# Patient Record
Sex: FEMALE | Race: WHITE | NOT HISPANIC OR LATINO | Employment: FULL TIME | URBAN - METROPOLITAN AREA
[De-identification: names, ages, dates, MRNs, and addresses within clinical notes are randomized per-mention and may not be internally consistent; named-entity substitution may affect disease eponyms.]

---

## 2017-01-06 ENCOUNTER — ALLSCRIPTS OFFICE VISIT (OUTPATIENT)
Dept: OTHER | Facility: OTHER | Age: 52
End: 2017-01-06

## 2017-05-01 ENCOUNTER — ALLSCRIPTS OFFICE VISIT (OUTPATIENT)
Dept: OTHER | Facility: OTHER | Age: 52
End: 2017-05-01

## 2017-05-01 DIAGNOSIS — Z12.31 ENCOUNTER FOR SCREENING MAMMOGRAM FOR MALIGNANT NEOPLASM OF BREAST: ICD-10-CM

## 2017-07-01 ENCOUNTER — TRANSCRIBE ORDERS (OUTPATIENT)
Dept: ADMINISTRATIVE | Facility: HOSPITAL | Age: 52
End: 2017-07-01

## 2017-07-01 ENCOUNTER — APPOINTMENT (OUTPATIENT)
Dept: LAB | Facility: MEDICAL CENTER | Age: 52
End: 2017-07-01
Payer: COMMERCIAL

## 2017-07-01 DIAGNOSIS — Z00.8 HEALTH EXAMINATION IN POPULATION SURVEYS: Primary | ICD-10-CM

## 2017-07-01 LAB
CHOLEST SERPL-MCNC: 186 MG/DL (ref 50–200)
EST. AVERAGE GLUCOSE BLD GHB EST-MCNC: 103 MG/DL
HBA1C MFR BLD: 5.2 % (ref 4.2–6.3)
HDLC SERPL-MCNC: 76 MG/DL (ref 40–60)
LDLC SERPL CALC-MCNC: 88 MG/DL (ref 0–100)
TRIGL SERPL-MCNC: 111 MG/DL

## 2017-07-01 PROCEDURE — 80061 LIPID PANEL: CPT | Performed by: PREVENTIVE MEDICINE

## 2017-07-01 PROCEDURE — 36415 COLL VENOUS BLD VENIPUNCTURE: CPT | Performed by: PREVENTIVE MEDICINE

## 2017-07-01 PROCEDURE — 83036 HEMOGLOBIN GLYCOSYLATED A1C: CPT | Performed by: PREVENTIVE MEDICINE

## 2017-07-14 ENCOUNTER — ANESTHESIA EVENT (OUTPATIENT)
Dept: PERIOP | Facility: HOSPITAL | Age: 52
End: 2017-07-14
Payer: COMMERCIAL

## 2017-07-21 ENCOUNTER — ANESTHESIA (OUTPATIENT)
Dept: PERIOP | Facility: HOSPITAL | Age: 52
End: 2017-07-21
Payer: COMMERCIAL

## 2017-07-21 ENCOUNTER — HOSPITAL ENCOUNTER (OUTPATIENT)
Facility: HOSPITAL | Age: 52
Setting detail: OUTPATIENT SURGERY
Discharge: HOME | End: 2017-07-21
Attending: PODIATRIST | Admitting: PODIATRIST
Payer: COMMERCIAL

## 2017-07-21 VITALS
DIASTOLIC BLOOD PRESSURE: 76 MMHG | RESPIRATION RATE: 16 BRPM | SYSTOLIC BLOOD PRESSURE: 137 MMHG | OXYGEN SATURATION: 99 % | BODY MASS INDEX: 27.6 KG/M2 | HEART RATE: 67 BPM | WEIGHT: 150 LBS | TEMPERATURE: 97.5 F | HEIGHT: 62 IN

## 2017-07-21 RX ORDER — PROPOFOL 10 MG/ML
INJECTION, EMULSION INTRAVENOUS AS NEEDED
Status: DISCONTINUED | OUTPATIENT
Start: 2017-07-21 | End: 2017-07-21 | Stop reason: SURG

## 2017-07-21 RX ORDER — ONDANSETRON 2 MG/ML
4 INJECTION INTRAMUSCULAR; INTRAVENOUS ONCE AS NEEDED
Status: DISCONTINUED | OUTPATIENT
Start: 2017-07-21 | End: 2017-07-21 | Stop reason: HOSPADM

## 2017-07-21 RX ORDER — DEXAMETHASONE SODIUM PHOSPHATE 4 MG/ML
INJECTION, SOLUTION INTRA-ARTICULAR; INTRALESIONAL; INTRAMUSCULAR; INTRAVENOUS; SOFT TISSUE AS NEEDED
Status: DISCONTINUED | OUTPATIENT
Start: 2017-07-21 | End: 2017-07-21 | Stop reason: HOSPADM

## 2017-07-21 RX ORDER — SODIUM CHLORIDE 9 MG/ML
125 INJECTION, SOLUTION INTRAVENOUS CONTINUOUS
Status: DISCONTINUED | OUTPATIENT
Start: 2017-07-21 | End: 2017-07-21 | Stop reason: HOSPADM

## 2017-07-21 RX ORDER — ONDANSETRON 2 MG/ML
INJECTION INTRAMUSCULAR; INTRAVENOUS AS NEEDED
Status: DISCONTINUED | OUTPATIENT
Start: 2017-07-21 | End: 2017-07-21 | Stop reason: SURG

## 2017-07-21 RX ORDER — LIDOCAINE HYDROCHLORIDE 10 MG/ML
INJECTION, SOLUTION INFILTRATION; PERINEURAL AS NEEDED
Status: DISCONTINUED | OUTPATIENT
Start: 2017-07-21 | End: 2017-07-21 | Stop reason: SURG

## 2017-07-21 RX ORDER — MIDAZOLAM HYDROCHLORIDE 1 MG/ML
INJECTION INTRAMUSCULAR; INTRAVENOUS AS NEEDED
Status: DISCONTINUED | OUTPATIENT
Start: 2017-07-21 | End: 2017-07-21 | Stop reason: SURG

## 2017-07-21 RX ORDER — BUPIVACAINE HYDROCHLORIDE 5 MG/ML
INJECTION, SOLUTION PERINEURAL AS NEEDED
Status: DISCONTINUED | OUTPATIENT
Start: 2017-07-21 | End: 2017-07-21 | Stop reason: HOSPADM

## 2017-07-21 RX ORDER — FENTANYL CITRATE 50 UG/ML
INJECTION, SOLUTION INTRAMUSCULAR; INTRAVENOUS AS NEEDED
Status: DISCONTINUED | OUTPATIENT
Start: 2017-07-21 | End: 2017-07-21 | Stop reason: SURG

## 2017-07-21 RX ORDER — FENTANYL CITRATE/PF 50 MCG/ML
25 SYRINGE (ML) INJECTION
Status: DISCONTINUED | OUTPATIENT
Start: 2017-07-21 | End: 2017-07-21 | Stop reason: HOSPADM

## 2017-07-21 RX ADMIN — DEXAMETHASONE SODIUM PHOSPHATE 4 MG: 10 INJECTION INTRAMUSCULAR; INTRAVENOUS at 07:50

## 2017-07-21 RX ADMIN — MIDAZOLAM HYDROCHLORIDE 4 MG: 1 INJECTION, SOLUTION INTRAMUSCULAR; INTRAVENOUS at 07:29

## 2017-07-21 RX ADMIN — ONDANSETRON HYDROCHLORIDE 4 MG: 2 INJECTION, SOLUTION INTRAVENOUS at 07:50

## 2017-07-21 RX ADMIN — SODIUM CHLORIDE 125 ML/HR: 0.9 INJECTION, SOLUTION INTRAVENOUS at 06:10

## 2017-07-21 RX ADMIN — PROPOFOL 200 MG: 10 INJECTION, EMULSION INTRAVENOUS at 07:38

## 2017-07-21 RX ADMIN — LIDOCAINE HYDROCHLORIDE 100 MG: 10 INJECTION, SOLUTION INFILTRATION; PERINEURAL at 07:38

## 2017-07-21 RX ADMIN — FENTANYL CITRATE 25 MCG: 50 INJECTION, SOLUTION INTRAMUSCULAR; INTRAVENOUS at 07:38

## 2017-08-07 ENCOUNTER — ALLSCRIPTS OFFICE VISIT (OUTPATIENT)
Dept: OTHER | Facility: OTHER | Age: 52
End: 2017-08-07

## 2017-08-11 ENCOUNTER — ALLSCRIPTS OFFICE VISIT (OUTPATIENT)
Dept: OTHER | Facility: OTHER | Age: 52
End: 2017-08-11

## 2017-08-11 DIAGNOSIS — R53.83 OTHER FATIGUE: ICD-10-CM

## 2017-08-11 DIAGNOSIS — R63.5 ABNORMAL WEIGHT GAIN: ICD-10-CM

## 2017-08-26 ENCOUNTER — APPOINTMENT (OUTPATIENT)
Dept: LAB | Facility: MEDICAL CENTER | Age: 52
End: 2017-08-26
Payer: COMMERCIAL

## 2017-08-26 DIAGNOSIS — R63.5 ABNORMAL WEIGHT GAIN: ICD-10-CM

## 2017-08-26 DIAGNOSIS — R53.83 OTHER FATIGUE: ICD-10-CM

## 2017-08-26 LAB — TSH SERPL DL<=0.05 MIU/L-ACNC: 1.34 UIU/ML (ref 0.36–3.74)

## 2017-08-26 PROCEDURE — 36415 COLL VENOUS BLD VENIPUNCTURE: CPT

## 2017-08-26 PROCEDURE — 84443 ASSAY THYROID STIM HORMONE: CPT

## 2017-09-07 PROCEDURE — 88305 TISSUE EXAM BY PATHOLOGIST: CPT | Performed by: SPECIALIST

## 2017-09-08 ENCOUNTER — LAB REQUISITION (OUTPATIENT)
Dept: LAB | Facility: HOSPITAL | Age: 52
End: 2017-09-08
Payer: COMMERCIAL

## 2017-09-08 DIAGNOSIS — D48.5 NEOPLASM OF UNCERTAIN BEHAVIOR OF SKIN: ICD-10-CM

## 2017-09-13 ENCOUNTER — ALLSCRIPTS OFFICE VISIT (OUTPATIENT)
Dept: OTHER | Facility: OTHER | Age: 52
End: 2017-09-13

## 2017-09-26 ENCOUNTER — ALLSCRIPTS OFFICE VISIT (OUTPATIENT)
Dept: OTHER | Facility: OTHER | Age: 52
End: 2017-09-26

## 2017-09-26 ENCOUNTER — TRANSCRIBE ORDERS (OUTPATIENT)
Dept: ADMINISTRATIVE | Facility: HOSPITAL | Age: 52
End: 2017-09-26

## 2017-09-26 DIAGNOSIS — R00.2 PALPITATIONS: Primary | ICD-10-CM

## 2017-10-27 NOTE — PROGRESS NOTES
Assessment  Assessed    1  Intermittent palpitations (785 1) (R00 2)   2  Abnormal weight gain (783 1) (R63 5)   3  Anxiety (300 00) (F41 9)   4  Paroxysmal supraventricular tachycardia (427 0) (I47 1)   5  Overweight (BMI 25 0-29 9) (278 02) (E66 3)   6  Dyslipidemia (272 4) (E78 5)    Plan  Dyslipidemia, Health Maintenance    · Atorvastatin Calcium 20 MG Oral Tablet; Take 1 tablet by mouth daily   Rx By: Jovana Fernandez; Dispense: 0 Days ; #:90 Tablet; Refill: 3;For: Dyslipidemia, Health Maintenance; ALONSO = N; Verified Transmission to [address] [place] [address]; Last Updated By: SystemDelight; 9/26/2017 9:43:32 AM  Intermittent palpitations    · HOLTER MONITOR - 24 HOUR; Status:Active; Requested for:05Mar2018; Perform:[facility]; MHB:[id number]; Last Updated By:Gina Linda; 9/26/2017 9:42:20 AM;Ordered; For:Intermittent palpitations; Ordered By:Tarik Mello;  Paroxysmal supraventricular tachycardia    · EKG/ECG- POC; Status:Complete;   Done: 18SHM0195   Perform: In Office; [phone]; Last Updated By:Yoly Ji; 9/26/2017 8:59:35 AM;Ordered; For:Paroxysmal supraventricular tachycardia; Ordered By:Tarik Mello;      1  Continue present medications  2  Renewed atorvastatin 20 mg #90 at 3, [facility] mail-order  3  Cardiology follow-up in approximately 6 months with Holter monitor, EKG,     Discussion/Summary  Cardiology Discussion Summary Free Text Note Form [facility]:     Increasing palpitations, mild in severity, with history of AVNRT/PSVT diagnosed in 2005  Controlled dyslipidemia with prior 1 1% 10 year and 38 8% lifetime ASCVD risks  History of chest pain with normal nuclear stress test on 9/29/16 26 5 pound weight gain since January, 2015 and 4 9 pound weight gain in approximately one year  and overweight status  Goals and Barriers: The patient has the current Goals: Maintenance of cardiovascular health  The patent has the current Barriers: None     Patient's Capacity to Self-Care: Patient is able to Self-Care  Medication SE Review and Pt Understands Tx: Possible side effects of new medications were reviewed with the patient/guardian today  The treatment plan was reviewed with the patient/guardian  The patient/guardian understands and agrees with the treatment plan   Counseling Documentation With Imm: The patient was counseled regarding diagnostic results,-- instructions for management,-- risk factor reductions,-- prognosis,-- patient and family education,-- impressions,-- risks and benefits of treatment options,-- importance of compliance with treatment  Self Referrals:   Self Referrals: Yes   Transitional Care: Co-manage care with PCP/Referring Provider  Chief Complaint  Chief Complaint Free Text Note Form: Jhonathan Gray is here today for a yearly followup  She states she has had increased fluttering  JW      History of Present Illness  Cardiology HPI Free Text Note Form Manuel Kebede:   24-year-old female has had increased frequency of brief flutters or butterfly sensations in her chest at rest or with activity in recent weeks  She currently works 9 hours per day as a  at [employer] within [employer] 5 days a week  She still has not reached a final settlement on her divorce negotiations  had foot surgery in July 21, 2017 for plantar fasciitis with fasciotomy performed  She is currently using meloxicam one daily for that issue  She is also on an estrogen and progesterone products since 1 May, 2017  There have been no other cardiopulmonary or medical symptoms  She exercises on a stationary bicycle for 20 minutes daily  Review of Systems  ROS Reviewed:   ROS reviewed  All other systems negative, except as noted in history of present illness  Active Problems  Problems    1  Abnormal weight gain (783 1) (R63 5)   2  Acute rhinitis (460) (J00)   3  Acute serous otitis media of left ear, recurrence not specified (381 01) (H65 02)   4  Anxiety (300 00) (F41 9)   5  History of Bunion, right foot (727 1) (M21 611)   6  Common migraine without aura (346 10) (G43 009)   7  Ear pain, left (388 70) (H92 02)   8  Encounter for screening mammogram for malignant neoplasm of breast (V76 12)   (Z12 31)   9  Fatigue (780 79) (R53 83)   10  Neck muscle spasm (728 85) (M62 838)   11  Neck pain, musculoskeletal (723 1) (M54 2)   12  Need for influenza vaccination (V04 81) (Z23)   13  Overweight (BMI 25 0-29 9) (278 02) (E66 3)   14  Paresthesia of right arm (782 0) (R20 2)   15  Paroxysmal supraventricular tachycardia (427 0) (I47 1)   16  Plantar fasciitis (728 71) (M72 2)   17  Postoperative visit (V58 49) (Z48 89)   18  History of Right carpal tunnel syndrome (354 0) (G56 01)   19  Symptomatic menopausal or female climacteric states (627 2) (N95 1)   20  Upper back pain (724 5) (M54 9)   21  Urinary frequency (788 41) (R35 0)   22  Urinary tract infection (599 0) (N39 0)   23  Vitamin D deficiency (268 9) (E55 9)   24  Well female exam with routine gynecological exam (V72 31) (Z01 419)    Past Medical History  Problems    1  History of Abnormal uterine bleeding (AUB) (626 9) (N93 9)   2  History of Bunion, right foot (727 1) (M21 611)   3  History of Encounter for routine gynecological examination (V72 31) (Z01 419)   4  History of Functional murmur (R01 0)   5  History of cardiac disorder (V12 50) (Z86 79)   6  History of migraine (V12 49) (Z86 69)   7  History of urinary tract infection (V13 02) (Z87 440)   8  History of urticaria (V13 3) (Z87 2)   9  History of Lightheadedness (780 4) (R42)   10  History of Perimenopause (627 2) (N95 1)   11  History of Right carpal tunnel syndrome (354 0) (G56 01)   12  History of Screening for HPV (human papillomavirus) (V73 81) (Z11 51)   13  History of Subserous leiomyoma of uterus (218 2) (D25 2)   14  History of Uterine leiomyoma, unspecified location (218 9) (D25 9)   15   History of Visit for screening mammogram (V76 12) (Z12 31)  Active Problems And Past Medical History Reviewed: The active problems and past medical history were reviewed and updated today  Surgical History  Problems    1  History of Ablation Of Vaginal Lesion(S)   2  History of Arthrosc Endoscopic Plantar Fasciotomy   3  History of Laparoscopy With Total Hysterectomy   4  History of Neuroplasty Decompression Median Nerve At Carpal Tunnel   5  History of Oviductal Surgery Bilateral Salpingectomy   6  History of Tubal Ligation   7  History of Vaginal Hysterectomy  Surgical History Reviewed: The surgical history was reviewed and updated today  Family History  Mother    1  Family history of diabetes mellitus (V18 0) (Z83 3)   2  Family history of malignant neoplasm of uterus (V16 49) (Z80 49)   3  Family history of obesity (V18 19) (Z83 49)  Father    4  Family history of chronic obstructive pulmonary disease (V17 6) (Z82 5)  Sister    5  Family history of hypothyroidism (V18 19) (Z83 49)  Brother    10  Family history of atrial fibrillation (V17 49) (Z82 49)  Maternal Grandmother    7  Family history of congestive heart failure (V17 49) (Z82 49)  Paternal Grandmother    8  Family history of myocardial infarction (V17 3) (Z82 49)  Maternal Grandfather    9  Family history of congestive heart failure (V17 49) (Z82 49)  Family History    10  Family history of diabetes mellitus (V18 0) (Z83 3)   11  Family history of malignant neoplasm (V16 9) (Z80 9)  Family History Reviewed: The family history was reviewed and updated today  Social History  Problems    · Alcohol use (V49 89) (Z78 9)   · Caffeine use (V49 89) (F15 90)   · Coffee   · Exercise: Walking   ·    · Never a smoker   · Occasional alcohol use   ·  (V61 03) (Z63 5)   · Sexually active  Social History Reviewed: The social history was reviewed and updated today  Current Meds   1  Atorvastatin Calcium 20 MG Oral Tablet; Take 1 tablet by mouth daily;    Therapy: 87DPW6720 to (Last Rx: 14KQL1785)  Requested for: 37DBO1682 Ordered   2  Estradiol 0 5 MG Oral Tablet; TAKE 1 TABLET DAILY; Therapy: 47LPU1399 to (IIGJBCLQ:58HZS3625)  Requested for: 2017; Last   VS:[id number] Ordered   3  Meloxicam 7 5 MG Oral Tablet; Therapy: (Lakeisha Arvizu) to Recorded   4  Metoprolol Succinate ER 50 MG Oral Tablet Extended Release 24 Hour; TAKE 1 TABLET   DAILY  Requested for: 96BYM4561; Last L25LXB4669 Ordered   5  Progesterone Micronized 100 MG Oral Capsule; take 1 capsule daily; Therapy: 47WJN5149 to (Last ZN:46QJP4461)  Requested for: 2017 Ordered  Medication List Reviewed: The medication list was reviewed and updated today  Allergies  Medication    1  Levaquin TABS  Non-Medication    2  No Known Environmental Allergies   3  No Known Food Allergies    Vitals  Vital Signs    Recorded: 90JKS7784 08:57AM   Heart Rate 75, Apical   Systolic 512, RUE, Sitting   Diastolic 84, RUE, Sitting   Height 5 ft 2 in   Weight 151 lb 14 4 oz   BMI Calculated 27 78   BSA Calculated 1 7   O2 Saturation 98, RA     Physical Exam    Constitutional   General appearance: No acute distress, well appearing and well nourished  Eyes   Conjunctiva and Sclera examination: Conjunctiva pink, sclera anicteric  Ears, Nose, Mouth, and Throat - Oropharynx: Clear, nares are clear, mucous membranes are moist    Neck   Neck and thyroid: Normal, supple, trachea midline, no thyromegaly  Pulmonary   Respiratory effort: No increased work of breathing or signs of respiratory distress  Auscultation of lungs: Clear to auscultation, no rales, no rhonchi, no wheezing, good air movement  Cardiovascular   Auscultation of heart: Normal rate and rhythm, normal S1 and S2, no murmurs  Carotid pulses: Normal, 2+ bilaterally  Peripheral vascular exam: Normal pulses throughout, no tenderness, erythema or swelling  Pedal pulses: Normal, 2+ bilaterally      Examination of extremities for edema and/or varicosities: Normal     Abdomen   Abdomen: Non-tender and no distention  Liver and spleen: No hepatomegaly or splenomegaly  Musculoskeletal Gait and station: Normal gait  -- Digits and nails: Normal without clubbing or cyanosis  -- Inspection/palpation of joints, bones, and muscles: Normal, ROM normal     Skin - Skin and subcutaneous tissue: Normal without rashes or lesions  Skin is warm and well perfused, normal turgor  Neurologic - Cranial nerves: II - XII intact  -- Speech: Normal     Psychiatric - Orientation to person, place, and time: Normal -- Mood and affect: Normal       Results/Data  ECG Report: EKG 9/26/17and unchanged from 8/30/16    (1) TSH WITH FT4 REFLEX 03Lpn5496 09:24AM [facility] Order Number: [id number]     Test Name Result Flag Reference   TSH 1 340 uIU/mL  0 358-3 740   Patients undergoing fluorescein dye angiography may retain small amounts of fluorescein in the body for 48-72 hours post procedure  Samples containing fluorescein can produce falsely depressed TSH values  If the patient had this procedure,a specimen should be resubmitted post fluorescein clearance  The recommended reference ranges for TSH during pregnancy are as follows:  First trimester 0 1 to 2 5 uIU/mL  Second trimester  0 2 to 3 0 uIU/mL  Third trimester 0 3 to 3 0 uIU/m     (1) LIPID PANEL, FASTING 91NNA4724 09:28AM Kenlotus Allison     Test Name Result Flag Reference   CHOLESTEROL 186 mg/dL     HDL,DIRECT 76 mg/dL H 40-60   Specimen collection should occur prior to Metamizole administration due to the potential for falsely depressed results     LDL CHOLESTEROL CALCULATED 88 mg/dL  0-100   This is a fasting blood test  Water,black tea or black  coffee only after 9:00pm the night before test  Drink 2 glasses of water the morning of test         Triglyceride:         Normal              <150 mg/dl       Borderline High    150-199 mg/dl       High               200-499 mg/dl       Very High          >499 mg/dl  Cholesterol:         Desirable        <200 mg/dl      Borderline High  200-239 mg/dl      High             >239 mg/dl  HDL Cholesterol:        High    >59 mg/dL      Low     <41 mg/dL  LDL CALCULATED:    This screening LDL is a calculated result  It does not have the accuracy of the Direct Measured LDL in the monitoring of patients with hyperlipidemia and/or statin therapy  Direct Measure LDL (GNT539) must be ordered separately in these patients  TRIGLYCERIDES 111 mg/dL  <=150   Specimen collection should occur prior to N-Acetylcysteine or Metamizole administration due to the potential for falsely depressed results  (1) HEMOGLOBIN A1C 76ZBK1006 09:28AM Wes Arriola     Test Name Result Flag Reference   HEMOGLOBIN A1C 5 2 %  4 2-6 3   EST  AVG  GLUCOSE 103 mg/dl       * XR FOOT 3+ VIEW RIGHT 81IBV8685 05:38PM Kerri Weston Order Number: [id number]     Test Name Result Flag Reference   XR FOOT 3+ VW RIGHT (Report)     RIGHT FOOT     INDICATION: Heel pain  Plantar fascial fibromatosis     COMPARISON: July 10, 2015     VIEWS: AP, lateral and oblique ; 3 images     FINDINGS:     There is no acute fracture or dislocation  Postsurgical changes are noted along the medial margin of the 1st metatarsal head with screw transfixing an old healed 1st metatarsal neck fracture, unchanged in alignment from July 10, 2015  Small plantar calcaneal spur  No lytic or blastic lesions are seen  Soft tissues are unremarkable  No plantar fascial calcification is seen  IMPRESSION:     Postop changes, 1st metatarsal, healed  Calcaneal spur  Workstation performed: [id number]     Signed by:   Trino Mckeon MD   12/8/16     Future Appointments    Date/Time Provider Specialty Site   10/13/2017 03:30 PM Rock Olivares [facility]   11/29/2017 03:45 PM WINSOME Steele   B[facility]   08/21/2018 09:00 AM WINSOME Bonilla  [address] [facility]     Signatures   Electronically signed by : WINSOME Ramirez ; Sep 26 2017  9:47AM EST                       (Author)

## 2017-12-21 ENCOUNTER — ALLSCRIPTS OFFICE VISIT (OUTPATIENT)
Dept: OTHER | Facility: OTHER | Age: 52
End: 2017-12-21

## 2017-12-22 NOTE — PROGRESS NOTES
Assessment   1  Acute right-sided low back pain with right-sided sciatica (724 2,724 3) (M54 41)    Plan   Acute right-sided low back pain with right-sided sciatica    · Cyclobenzaprine HCl - 5 MG Oral Tablet; TAKE 1 TO 2 TABLETS 3 TIMES DAILY    AS NEEDED   · PredniSONE 10 MG Oral Tablet; Take 4 tabs PO x 3 days then 3 tabs PO x 3 days    then 2 tabs PO x 3 days then 1 tab PO x 3 days then STOP   · Follow-up PRN Evaluation and Treatment  Follow-up  Status: Complete  Done:    [id number]    Discussion/Summary      New onset  Will have patient use a muscle relaxer with cyclobenzaprine 5-10 mg up to 3 times daily as needed  Patient instructed not to drive while on the muscle relaxer  Will also have patient use a course of oral steroids as well  May benefit from physical therapy and/ or imaging if symptoms persist  Out of work for today and tomorrow  Follow-up in 2 weeks if symptoms persist or sooner if needed  Possible side effects of new medications were reviewed with the patient/guardian today  The treatment plan was reviewed with the patient/guardian  The patient/guardian understands and agrees with the treatment plan      Chief Complaint   1  Back Pain  c/o low back pain and spasms      History of Present Illness   HPI: Patient presents with low back pain  Location is right-sided low back  Described as a sharp pain at times but a baseline dull ache  Has associated radiation of pain down the right leg  No loss of bowel or bladder function  Symptoms are worsening  Patient did receive a Toradol injection at Dr Serjio Pastor office where she is a   That did help improve her symptoms somewhat  Symptoms are exacerbated when she sits in correctly, stands the wrong way, walks funny and lays down the wrong way  No loss of bowel or bladder function  Symptoms started when patient was bending over to clean the cat litter box  Review of Systems        Constitutional: no fever        Cardiovascular: no chest pain  Respiratory: no shortness of breath  Active Problems   1  Abnormal weight gain (783 1) (R63 5)   2  Acute rhinitis (460) (J00)   3  Acute serous otitis media of left ear, recurrence not specified (381 01) (H65 02)   4  Anxiety (300 00) (F41 9)   5  History of Bunion, right foot (727 1) (M21 611)   6  Common migraine without aura (346 10) (G43 009)   7  Dyslipidemia (272 4) (E78 5)   8  Ear pain, left (388 70) (H92 02)   9  Encounter for screening mammogram for malignant neoplasm of breast (V76 12)     (Z12 31)   10  Fatigue (780 79) (R53 83)   11  Intermittent palpitations (785 1) (R00 2)   12  Neck muscle spasm (728 85) (M62 838)   13  Neck pain, musculoskeletal (723 1) (M54 2)   14  Need for influenza vaccination (V04 81) (Z23)   15  Overweight (BMI 25 0-29 9) (278 02) (E66 3)   16  Paresthesia of right arm (782 0) (R20 2)   17  Paroxysmal supraventricular tachycardia (427 0) (I47 1)   18  Plantar fasciitis (728 71) (M72 2)   19  Postoperative visit (V58 49) (Z48 89)   20  History of Right carpal tunnel syndrome (354 0) (G56 01)   21  Symptomatic menopausal or female climacteric states (627 2) (N95 1)   22  Upper back pain (724 5) (M54 9)   23  Urinary frequency (788 41) (R35 0)   24  Urinary tract infection (599 0) (N39 0)   25  Vitamin D deficiency (268 9) (E55 9)   26  Well female exam with routine gynecological exam (V72 31) (Z01 419)    Past Medical History   1  History of Abnormal uterine bleeding (AUB) (626 9) (N93 9)   2  History of Bunion, right foot (727 1) (M21 611)   3  History of Encounter for routine gynecological examination (V72 31) (Z01 419)   4  History of Functional murmur (R01 0)   5  History of cardiac disorder (V12 50) (Z86 79)   6  History of migraine (V12 49) (Z86 69)   7  History of urinary tract infection (V13 02) (Z87 440)   8  History of urticaria (V13 3) (Z87 2)   9  History of Lightheadedness (780 4) (R42)   10  History of Perimenopause (627 2) (N95 1)   11  History of Right carpal tunnel syndrome (354 0) (G56 01)   12  History of Screening for HPV (human papillomavirus) (V73 81) (Z11 51)   13  History of Subserous leiomyoma of uterus (218 2) (D25 2)   14  History of Uterine leiomyoma, unspecified location (218 9) (D25 9)   15  History of Visit for screening mammogram (S41 29) (Z12 31)  Active Problems And Past Medical History Reviewed: The active problems and past medical history were reviewed and updated today  Family History   Mother    1  Family history of diabetes mellitus (V18 0) (Z83 3)   2  Family history of malignant neoplasm of uterus (V16 49) (Z80 49)   3  Family history of obesity (V18 19) (Z83 49)  Father    4  Family history of chronic obstructive pulmonary disease (V17 6) (Z82 5)  Sister    5  Family history of hypothyroidism (V18 19) (Z83 49)  Brother    10  Family history of atrial fibrillation (V17 49) (Z82 49)  Maternal Grandmother    7  Family history of congestive heart failure (V17 49) (Z82 49)  Paternal Grandmother    8  Family history of myocardial infarction (V17 3) (Z82 49)  Maternal Grandfather    9  Family history of congestive heart failure (V17 49) (Z82 49)  Family History    10  Family history of diabetes mellitus (V18 0) (Z83 3)   11  Family history of malignant neoplasm (V16 9) (Z80 9)    Social History    · Alcohol use (V49 89) (Z78 9)   · Caffeine use (V49 89) (F15 90)   · Coffee   · Exercise: Walking   ·    · Never a smoker   · Occasional alcohol use   ·  (V61 03) (Z63 5)   · Sexually active    Surgical History   1  History of Ablation Of Vaginal Lesion(S)   2  History of Arthrosc Endoscopic Plantar Fasciotomy   3  History of Laparoscopy With Total Hysterectomy   4  History of Neuroplasty Decompression Median Nerve At Carpal Tunnel   5  History of Oviductal Surgery Bilateral Salpingectomy   6  History of Tubal Ligation   7  History of Vaginal Hysterectomy    Current Meds    1   Atorvastatin Calcium 20 MG Oral Tablet; Take 1 tablet by mouth daily; Therapy: 15EXH2697 to (Last Nida Henry)  Requested for: 39FZR5793 Ordered   2  Estradiol 0 5 MG Oral Tablet; TAKE 1 TABLET DAILY; Therapy: 68SDH3697 to (JPQIGKYR:58CCZ9255)  Requested for: 27Jun2017; Last     BM:43JGO1190 Ordered   3  Meloxicam 7 5 MG Oral Tablet; Therapy: (Edgar Elder) to Recorded   4  Metoprolol Succinate ER 50 MG Oral Tablet Extended Release 24 Hour; TAKE 1 TABLET     DAILY  Requested for: 38SNH7473; Last [id number]:[id number] Ordered   5  Progesterone Micronized 100 MG Oral Capsule; take 1 capsule daily; Therapy: 79NBL1803 to (Last DU:51GWQ7824)  Requested for: 27Jun2017 Ordered     The medication list was reviewed and updated today  Allergies   1  Levaquin TABS  2  No Known Environmental Allergies   3  No Known Food Allergies    Vitals    Recorded: 65Bhs7893 11:08AM   Heart Rate 64   Respiration 16   Systolic 057   Diastolic 90   Weight 754 lb 4 oz   BMI Calculated 27 12   BSA Calculated 1 68     Physical Exam        Constitutional      General appearance: Abnormal   appears healthy-- and-- uncomfortable  Pulmonary      Respiratory effort: No increased work of breathing or signs of respiratory distress  Auscultation of lungs: Clear to auscultation  Cardiovascular      Auscultation of heart: Normal rate and rhythm, normal S1 and S2, without murmurs  Examination of extremities for edema and/or varicosities: Normal        Musculoskeletal      Gait and station: Abnormal  -- Ambulating slowly  Inspection/palpation of joints, bones, and muscles: Abnormal  -- Low back with no obvious abnormality on inspection  There is pain on palpation of the right side paravertebral muscles as well as associated spasm  Bilateral sitting straight leg raise is negative  Future Appointments      Date/Time Provider Specialty Site   08/21/2018 09:00 AM WINSOME Bansal   [address] [facility]     Signatures Electronically signed by : Quang Valerio DO; Dec 21 2017 11:29AM EST                       (Author)

## 2018-01-09 NOTE — CONSULTS
Chief Complaint  Chief Complaint Free Text Note Form: Patient is here today for MWM Consultation  Attended the Info Seminar  Stop Ban      History of Present Illness  Free Text HPI: Excess weight-  Severity: Mild  Onset: past 2 years  Modifiers: following hysterectomy, bunionectomy, and plantar fasciotomy-decreased physical activity, more sedentary   Associated Symptoms: doesn't like the way her clothes fit    B-skip, cup of tea w/ FV creamer  L 1/2 sandwhich meat and cheese in a wrap  S- Activia greek yogurt  D-meat and salad or meat and veggie, limited starch(once per week)  Drinks: 32oz water, 16oz tea, sometimes 1 glass of wine, 3 or 4 glasses on the weekend       Past Medical History    1  History of Abnormal uterine bleeding (AUB) (626 9) (N93 9)   2  History of Bunion, right foot (727 1) (M21 611)   3  History of Encounter for routine gynecological examination (V72 31) (Z01 419)   4  History of Functional murmur (R01 0)   5  History of cardiac disorder (V12 50) (Z86 79)   6  History of migraine (V12 49) (Z86 69)   7  History of urinary tract infection (V13 02) (Z87 440)   8  History of urticaria (V13 3) (Z87 2)   9  History of Lightheadedness (780 4) (R42)   10  History of Perimenopause (627 2) (N95 1)   11  History of Right carpal tunnel syndrome (354 0) (G56 01)   12  History of Screening for HPV (human papillomavirus) (V73 81) (Z11 51)   13  History of Subserous leiomyoma of uterus (218 2) (D25 2)   14  History of Uterine leiomyoma, unspecified location (218 9) (D25 9)   15  History of Visit for screening mammogram (X22 41) (Z12 31)  Active Problems And Past Medical History Reviewed: The active problems and past medical history were reviewed and updated today  Assessment    1  Abnormal weight gain (783 1) (R63 5)   2  Paroxysmal supraventricular tachycardia (427 0) (I47 1)   3  Overweight (BMI 25 0-29 9) (278 02) (E66 3)   4   Symptomatic menopausal or female climacteric states (627 2) (N95 1)    Discussion/Summary  Discussion Summary:   61-year-old female with hyperlipidemia, palpitations, menopausal symptoms and excess weight here to pursue medical weight management to improve weight and health  Excess weight/weight gain/overweight:   -discussed options of conservative vs LETTY program +/- meal replacement vs VLCD  -initial focus of 5-10% weight loss over 3-6 mos for improved health  -screening labs-consider CMP and insulin, lipids A!c and TSH within acceptable limits    Hyperlipidemia: Stable  -Continue with low-dose statin    Palpitations/PSVT:  -On beta blocker  -Cautious with sympathomimetics     Menopausal symptoms:  -On hormone replacement as per GYN-Dr Álvarez      Patient is interested in completing body composition testing today will help arrange if not at next visit  Return to see dietitian in 2-4 weeks for menu planning and with me in 2-3 mos  Goals and Barriers: The patient has the current Goals:   Goals:  1  Food log www  [email or web address]  2  No sugary beverages  At least 64oz of water daily  3  1565-1295 calories, see sample menu  4  Weigh self 1-2 times per week  Patient's Capacity to Self-Care: Patient is able to Self-Care  Patient Education: Educational resources provided: Weight loss packed provided  Understands and agrees with treatment plan: The treatment plan was reviewed with the patient/guardian  The patient/guardian understands and agrees with the treatment plan   Self Referrals:   Self Referrals: Yes [employer] Employee- Self; Work Email      Review of Systems  Focused-Female:   Constitutional: no fever  ENT: no sore throat  Cardiovascular: palpitations  Respiratory: no shortness of breath  Gastrointestinal: vomiting and occasional heartburn, but no abdominal pain and no nausea  Genitourinary: no dysuria and no incontinence  Musculoskeletal: arthralgias  Integumentary: no rashes  Neurological: headache  Other Symptoms: Psych: denies depression/anxiety  Active Problems    1  Abnormal weight gain (783 1) (R63 5)   2  Acute rhinitis (460) (J00)   3  Acute serous otitis media of left ear, recurrence not specified (381 01) (H65 02)   4  Anxiety (300 00) (F41 9)   5  History of Bunion, right foot (727 1) (M21 611)   6  Common migraine without aura (346 10) (G43 009)   7  Ear pain, left (388 70) (H92 02)   8  Encounter for screening mammogram for malignant neoplasm of breast (V76 12)   (Z12 31)   9  Fatigue (780 79) (R53 83)   10  Neck muscle spasm (728 85) (M62 838)   11  Neck pain, musculoskeletal (723 1) (M54 2)   12  Need for influenza vaccination (V04 81) (Z23)   13  Paresthesia of right arm (782 0) (R20 2)   14  Paroxysmal supraventricular tachycardia (427 0) (I47 1)   15  Plantar fasciitis (728 71) (M72 2)   16  Postoperative visit (V58 49) (Z48 89)   17  History of Right carpal tunnel syndrome (354 0) (G56 01)   18  Symptomatic menopausal or female climacteric states (627 2) (N95 1)   19  Upper back pain (724 5) (M54 9)   20  Urinary frequency (788 41) (R35 0)   21  Urinary tract infection (599 0) (N39 0)   22  Vitamin D deficiency (268 9) (E55 9)   23  Well female exam with routine gynecological exam (V72 31) (Z01 419)    Surgical History    1  History of Ablation Of Vaginal Lesion(S)   2  History of Arthrosc Endoscopic Plantar Fasciotomy   3  History of Laparoscopy With Total Hysterectomy   4  History of Neuroplasty Decompression Median Nerve At Carpal Tunnel   5  History of Oviductal Surgery Bilateral Salpingectomy   6  History of Tubal Ligation   7  History of Vaginal Hysterectomy  Surgical History Reviewed: The surgical history was reviewed and updated today  Family History  Mother    1  Family history of diabetes mellitus (V18 0) (Z83 3)   2  Family history of malignant neoplasm of uterus (V16 49) (Z80 49)   3  Family history of obesity (V18 19) (Z83 49)  Father    4   Family history of chronic obstructive pulmonary disease (V17 6) (Z82 5)  Sister    5  Family history of hypothyroidism (V18 19) (Z83 49)  Brother    10  Family history of atrial fibrillation (V17 49) (Z82 49)  Maternal Grandmother    7  Family history of congestive heart failure (V17 49) (Z82 49)  Paternal Grandmother    8  Family history of myocardial infarction (V17 3) (Z82 49)  Maternal Grandfather    9  Family history of congestive heart failure (V17 49) (Z82 49)  Family History    10  Family history of diabetes mellitus (V18 0) (Z83 3)   11  Family history of malignant neoplasm (V16 9) (Z80 9)  Family History Reviewed: The family history was reviewed and updated today  Social History    · Alcohol use (V49 89) (Z78 9)   · Caffeine use (V49 89) (F15 90)   · Coffee   · Exercise: Walking   ·    · Never a smoker   · Occasional alcohol use   ·  (V61 03) (Z63 5)   · Sexually active  Social History Reviewed: The social history was reviewed and updated today  Current Meds   1  Atorvastatin Calcium 10 MG Oral Tablet; Take 1 tablet daily Recorded   2  Estradiol 0 5 MG Oral Tablet; TAKE 1 TABLET DAILY; Therapy: 24IOZ7063 to (TEQRIHGN:54YRB6585)  Requested for: 27Jun2017; Last   OP:90DYX2583 Ordered   3  Meloxicam 7 5 MG Oral Tablet; Therapy: (Ramin Esqueda) to Recorded   4  Metoprolol Succinate ER 50 MG Oral Tablet Extended Release 24 Hour; TAKE 1 TABLET   DAILY  Requested for: 55UVT9546; Last XY:93WKN9736 Ordered   5  Progesterone Micronized 100 MG Oral Capsule; take 1 capsule daily; Therapy: 54CDD1172 to (Last Rx:27Jun2017)  Requested for: 27Jun2017 Ordered   6  Zostavax 41911 UNT/0 65ML Subcutaneous Solution Reconstituted; adm  one dose as   directed; Therapy: 23Mho8335 to (Last Rx:11Aug2017) Ordered  Medication List Reviewed: The medication list was reviewed and updated today  Allergies    1  Levaquin TABS    2  No Known Environmental Allergies   3   No Known Food Allergies    Vitals  Vital Signs    Recorded: 85Jmr3660 03:03PM Temperature 97 7 F, Tympanic   Heart Rate 68, L Radial   Pulse Quality Normal, L Radial   Systolic 367, LUE, Sitting   Diastolic 72, LUE, Sitting   Height 5 ft 2 in   Weight 152 lb 1 6 oz   BMI Calculated 27 82   BSA Calculated 1 7   Waist Circumference 33 in  Neck Circumference 13 in  Physical Exam    Constitutional   General appearance: Abnormal   well developed and overweight  Eyes No conjunctival pallor  Ears, Nose, Mouth, and Throat Moist oral mucosa  Pulmonary   Respiratory effort: No increased work of breathing or signs of respiratory distress  Auscultation of lungs: Clear to auscultation  Cardiovascular   Auscultation of heart: Normal rate and rhythm, normal S1 and S2, without murmurs  Abdomen   Abdomen: Non-tender, no masses  Musculoskeletal   Gait and station: Normal     Psychiatric   Orientation to person, place, and time: Normal     Mood and affect: Normal          Results/Data  STOP BANG Questionnaire 44Bah7967 03:24PM User, Ahs     Test Name Result Flag Reference   STOP BANG Questionnaire Risk of KAR Low Risk     Snoring: No  Tired: No  Observed: No  Blood Pressure: No  BMI: No  Age: No  Neck Circumference: No  Gender: No   STOP BANG Questionnaire KAR Total Score 0     Snoring: No  Tired: No  Observed: No  Blood Pressure: No  BMI: No  Age: No  Neck Circumference: No  Gender: No       Future Appointments    Date/Time Provider Specialty Site   10/13/2017 03:30 PM [facility]   11/29/2017 03:45 PM WINSOME Berry  Bariatric Medicine [facility]   09/26/2017 08:40 AM WINSOME Turk  Cardiology [facility] CARDIOLOGY [facility]   08/21/2018 09:00 AM WINSOME Pitt   [address] [facility]     Signatures   Electronically signed by : WINSOME Matamoros ; Sep 13 2017  3:55PM EST                       (Author)

## 2018-01-11 NOTE — PROGRESS NOTES
Assessment    1  Postoperative visit (V58 49) (Z48 89)   2  History of Abnormal uterine bleeding (AUB) (626 9) (N93 9)   3  History of Oviductal Surgery Bilateral Salpingectomy    Discussion/Summary    May return to work Feb 8th  No intercourse for 12 weeks  Return to office in 4 weeks for post op check  The treatment plan was reviewed with the patient/guardian  The patient/guardian understands and agrees with the treatment plan      Chief Complaint  Feels pain with bowel movement and with urination  Has a strange feeling when getting up from standing  Post-Op  HPI: pt doing well  some days better than others but improving  has some gas at times, pain in low pelvis with BM or urination  No fevers/chills  Pathology and pics reviewed with patient  All questions answered  Post-Op Hysterectomy:   Roque Brunson is status post total laparoscopic hysterectomy and with bilateral salpingectomy performed on 1/12/16  HPI:   The patient reports fatigue, but no backache, normal PO intake, no fever, no excessive pain, not sexually active since surgery, no bloating, no loose stools, no nausea, no excessive vaginal bleeding and no pelvic pain  Intermittent spotting  PE:   Abdominal Exam: soft, nontender, no rebound tenderness and no guarding  Genitourinary Exam: cuff intact, but normal external genitalia, absent uterus and normal vaginal exam  The surgical incision site(s) was clean, dry and intact, not warm, not indurated, not erythematous, not ecchymotic, not swollen and not tender  Assessment:   Post-op, the patient is doing well, has excellent pain control and is showing no signs of infection  Plan: Activity Restrictions: None The patient is advised to avoid sexual intercourse, against using tampons or vaginal suppositories, to participate in activities of daily living with limitations, to work without limitations and may return to work feb 8th  Done this visit: remove sutures/staples  Patient instructed to follow up in 4 weeks  no vomiting no constipation no vaginal discharge no vaginal odor      Active Problems    1  Acute sinusitis (461 9) (J01 90)   2  Anxiety (300 00) (F41 9)   3  Bunion, right foot (727 1) (M20 11)   4  Dizziness (780 4) (R42)   5  Encounter for routine gynecological examination (V72 31) (Z01 419)   6  Hives (708 9) (L50 9)   7  Neck muscle spasm (728 85) (M62 838)   8  Neck pain, musculoskeletal (723 1) (M54 2)   9  Palpitations (785 1) (R00 2)   10  Paresthesia of right arm (782 0) (R20 2)   11  Perimenopause (627 2) (N95 1)   12  Right carpal tunnel syndrome (354 0) (G56 01)   13  Screening for HPV (human papillomavirus) (V73 81) (Z11 51)   14  Subserous leiomyoma of uterus (218 2) (D25 2)   15  Upper back pain (724 5) (M54 9)   16  Urinary frequency (788 41) (R35 0)   17  Urinary tract infection (599 0) (N39 0)   18  Visit for screening mammogram (V76 12) (Z12 31)    Current Meds   1  Cranberry CAPS Recorded   2  Magnesium 250 MG Oral Tablet Recorded   3  Toprol XL 50 MG Oral Tablet Extended Release 24 Hour; TAKE 1 TABLET DAILY; Therapy: (Recorded:52Vyv0711) to Recorded   4  Tranexamic Acid 650 MG Oral Tablet; TAKE 2 TABLET 3 times daily; Therapy: 48JXJ0278 to (Evaluate:70Tvv8814)  Requested for: 16OTF6636; Last   Rx:16Nov2015 Ordered   5  Vitamin B6 TABS Recorded   6  Vitamin C 500 MG Oral Capsule Recorded   7  Vitamin E TABS; Therapy: (Recorded:16Nov2015) to Recorded    Allergies    1  Levaquin TABS    Vitals   Recorded: 65UCQ8104 10:22AM   Heart Rate 72   Respiration 16   Systolic 959, Sitting   Diastolic 90, Sitting   Height 5 ft 1 in   Weight 140 lb 4 oz   BMI Calculated 26 5   BSA Calculated 1 62     Future Appointments    Date/Time Provider Specialty Site   01/29/2016 08:00 AM WINSOME Mei  [address] [place] [address]   02/11/2016 08:00 AM WINSOME Mei [address]   02/18/2016 09:45 AM Austin Jon DO Obstetrics/Gynecology [address]     Signatures   Electronically signed by : Nolene Ormond, DO; Jan 25 1819 10:41AM EST                       (Author)

## 2018-01-12 VITALS
HEART RATE: 76 BPM | HEIGHT: 62 IN | SYSTOLIC BLOOD PRESSURE: 130 MMHG | BODY MASS INDEX: 28.52 KG/M2 | WEIGHT: 155 LBS | DIASTOLIC BLOOD PRESSURE: 84 MMHG

## 2018-01-13 VITALS
TEMPERATURE: 98 F | BODY MASS INDEX: 27.42 KG/M2 | DIASTOLIC BLOOD PRESSURE: 80 MMHG | HEIGHT: 62 IN | SYSTOLIC BLOOD PRESSURE: 124 MMHG | RESPIRATION RATE: 16 BRPM | WEIGHT: 149 LBS | HEART RATE: 74 BPM

## 2018-01-13 NOTE — RESULT NOTES
Verified Results  (1) VITAMIN D 25-HYDROXY 93Ase9947 07:21AM Subha Whitman    Order Number: [id number]     Test Name Result Flag Reference   VIT D 25-HYDROX 35 5 ng/mL  30 0-100 0   This assay is a certified procedure of the CDC Vitamin D Standardization Certification Program (VDSCP)     Deficiency <20ng/ml   Insufficiency 20-30ng/ml   Sufficient  ng/ml     *Patients undergoing fluorescein dye angiography may retain small amounts of fluorescein in the body for 48-72 hours post procedure  Samples containing fluorescein can produce falsely elevated Vitamin D values  If the patient had this procedure, a specimen should be resubmitted post fluorescein clearance

## 2018-01-14 VITALS
HEART RATE: 75 BPM | WEIGHT: 151.9 LBS | BODY MASS INDEX: 27.95 KG/M2 | HEIGHT: 62 IN | SYSTOLIC BLOOD PRESSURE: 136 MMHG | OXYGEN SATURATION: 98 % | DIASTOLIC BLOOD PRESSURE: 84 MMHG

## 2018-01-14 VITALS
WEIGHT: 152.1 LBS | HEART RATE: 68 BPM | BODY MASS INDEX: 27.99 KG/M2 | DIASTOLIC BLOOD PRESSURE: 72 MMHG | TEMPERATURE: 97.7 F | HEIGHT: 62 IN | SYSTOLIC BLOOD PRESSURE: 130 MMHG

## 2018-01-14 VITALS
WEIGHT: 148.25 LBS | DIASTOLIC BLOOD PRESSURE: 98 MMHG | HEIGHT: 62 IN | SYSTOLIC BLOOD PRESSURE: 122 MMHG | HEART RATE: 67 BPM | BODY MASS INDEX: 27.28 KG/M2

## 2018-01-15 NOTE — PROGRESS NOTES
Assessment   1  Never a smoker  2  Vitamin D deficiency (268 9) (E55 9)  3  Acute rhinitis (460) (J00)  4  Plantar fasciitis (728 71) (M72 2)  5  Encounter for preventive health examination (V70 0) (Z00 00)1      1 Amended By: Lindsay Shi; Jul 27 2016 8:38 PM EST    Plan  Acute rhinitis    · Start: Fluticasone Propionate 50 MCG/ACT Nasal Suspension; USE 2 SPRAYS IN EACH  NOSTRIL ONCE DAILY  Health Maintenance    · Follow-up visit in 1 year Evaluation and Treatment  Follow-up  Status: Hold For -  Scheduling  Requested for: 90Veq1433  Plantar fasciitis    · Heel Lift; Status:Complete;   Done: 06GFV5240 08:24PM   · Rub ice on the affected area 4 times a day for 15 minutes for the first 2 days ;  Status:Complete;   Done: 43UXR0195 08:24PM   · We recommend that you stretch your hamstring muscles lying down  Hold this stretch for  10 seconds and repeat 5 times per set, doing a set 3 times a day ; Status:Complete;    Done: 41ELJ0159 08:24PM  Vitamin D deficiency    · (1) VITAMIN D 25-HYDROXY; Status:Active; Requested for:62Nby1873;     Discussion/Summary  health maintenance visit Currently, she eats a healthy diet and has an adequate exercise regimen  cervical cancer screening is current cervical cancer screening is managed by [facility] Breast cancer screening: the risks and benefits of breast cancer screening were discussed, the next mammogram is due Now and breast cancer screening is managed by [facility]  Colorectal cancer screening: the risks and benefits of colorectal cancer screening were discussed and colorectal cancer screening is not indicated  Osteoporosis screening: the risks and benefits of osteoporosis screening were discussed  She was advised to be evaluated by an optometrist and [facility] in [place]  Advice and education were given regarding nutrition, aerobic exercise, weight bearing exercise and weight loss  Patient discussion: discussed with the patient  History of Present Illness  HM, Adult Female:  The patient is being seen for a health maintenance evaluation  Social History: Household members include adult children  She is   Work status: working full time and occupation: [employer]  The patient has never smoked cigarettes  She reports occasional alcohol use  The patient has no concerns about alcohol abuse  She has never used illicit drugs  General Health: The patient's health since the last visit is described as good  She has regular dental visits  She denies vision problems  She denies hearing loss  Immunizations status: up to date  Lifestyle:  She consumes a diverse and healthy diet  She does not have any weight concerns  She exercises regularly  She exercises less than three times a week for 30 or more minutes per session  Exercise includes walking  She does not use tobacco  She denies alcohol use  She denies drug use  Reproductive health: the patient is perimenopausal   she reports normal menses  Screening: cancer screening reviewed and current  metabolic screening reviewed and current  risk screening reviewed and current  Review of Systems    Constitutional: feeling poorly, recent 9 lb weight gain and feeling tired, but no fever and no chills  Eyes: No complaints of eye pain, no red eyes, no eyesight problems, no discharge, no dry eyes, no itching of eyes  ENT: no complaints of earache, no loss of hearing, no nose bleeds, no nasal discharge, no sore throat, no hoarseness  Cardiovascular: No complaints of slow heart rate, no fast heart rate, no chest pain, no palpitations, no leg claudication, no lower extremity edema  Respiratory: No complaints of shortness of breath, no wheezing, no cough, no SOB on exertion, no orthopnea, no PND  Gastrointestinal: No complaints of abdominal pain, no constipation, no nausea or vomiting, no diarrhea, no bloody stools     Genitourinary: No complaints of dysuria, no incontinence, no pelvic pain, no dysmenorrhea, no vaginal discharge or bleeding  Musculoskeletal: Heel pain, worse in am when she gets up also after she has been off her feet for a while , but No complaints of arthralgias, no myalgias, no joint swelling or stiffness, no limb pain or swelling  Integumentary: No complaints of skin rash or lesions, no itching, no skin wounds, no breast pain or lump  Neurological: No complaints of headache, no confusion, no convulsions, no numbness, no dizziness or fainting, no tingling, no limb weakness, no difficulty walking  Psychiatric: Not suicidal, no sleep disturbance, no anxiety or depression, no change in personality, no emotional problems  Active Problems   1  Anxiety (300 00) (F41 9)  2  History of Bunion, right foot (727 1) (M20 11)  3  Encounter for routine gynecological examination (V72 31) (Z01 419)  4  Fatigue (780 79) (R53 83)  5  Neck muscle spasm (728 85) (M62 838)  6  Neck pain, musculoskeletal (723 1) (M54 2)  7  Paresthesia of right arm (782 0) (R20 2)  8  Perimenopause (627 2) (N95 1)  9  Postoperative visit (V58 49) (Z48 89)  10  History of Right carpal tunnel syndrome (354 0) (G56 01)  11  Screening for HPV (human papillomavirus) (V73 81) (Z11 51)  12  Subserous leiomyoma of uterus (218 2) (D25 2)  13  Upper back pain (724 5) (M54 9)  14  Urinary frequency (788 41) (R35 0)  15  Urinary tract infection (599 0) (N39 0)  16  Visit for screening mammogram (V76 12) (Z12 31)  17   Vitamin D deficiency (268 9) (E55 9)    Past Medical History    · History of Abnormal uterine bleeding (AUB) (626 9) (N93 9)   · History of Bunion, right foot (727 1) (M20 11)   · History of Functional murmur (R01 0)   · History of cardiac disorder (V12 50) (Z86 79)   · History of migraine (V12 49) (Z86 69)   · History of paroxysmal supraventricular tachycardia (V12 59) (Z86 79)   · History of urinary tract infection (V13 02) (Z87 440)   · History of urticaria (V13 3) (Z87 2)   · History of Lightheadedness (780 4) (R42)   · History of Right carpal tunnel syndrome (354 0) (G56 01)   · History of Uterine leiomyoma, unspecified location (218 9) (D25 9)    The past medical history was reviewed and updated today  Surgical History    · History of Ablation Of Vaginal Lesion(S)   · History of Foot Surgery   · History of Laparoscopy With Total Hysterectomy   · History of Neuroplasty Decompression Median Nerve At Carpal Tunnel   · History of Oviductal Surgery Bilateral Salpingectomy   · History of Tubal Ligation    The surgical history was reviewed and updated today  Family History  Mother    · Family history of diabetes mellitus (V18 0) (Z83 3)   · Family history of malignant neoplasm of uterus (V16 49) (Z80 49)   · Family history of obesity (V18 19) (Z83 49)  Father    · Family history of chronic obstructive pulmonary disease (V17 6) (Z82 5)  Maternal Grandmother    · Family history of congestive heart failure (V17 49) (Z82 49)  Paternal Grandmother    · Family history of myocardial infarction (V17 3) (Z82 49)  Maternal Grandfather    · Family history of congestive heart failure (V17 49) (Z82 49)  Family History    · Family history of diabetes mellitus (V18 0) (Z83 3)   · Family history of malignant neoplasm (V16 9) (Z80 9)    The family history was reviewed and updated today  Social History    · Alcohol use (V49 89) (Z78 9)   · Caffeine use (V49 89) (F15 90)   · Coffee   · Exercise: Walking   ·    · Never a smoker   · Occasional alcohol use   ·  (V61 03) (Z63 5)   · Sexually active  The social history was reviewed and updated today  The social history was reviewed and is unchanged  Current Meds  1  Cranberry CAPS Recorded  2  Multi Vitamin/Minerals Oral Tablet; Take 1 tablet daily Recorded  3  Toprol XL 50 MG Oral Tablet Extended Release 24 Hour; TAKE 1 TABLET DAILY; Therapy: (Recorded:23Jgg5827) to Recorded    The medication list was reviewed and updated today  Allergies   1  Levaquin TABS   2   No Known Environmental Allergies  3  No Known Food Allergies    Vitals   Recorded: 16LXU0136 16:48UC   Systolic 335   Diastolic 66   Heart Rate 68   Respiration 16   Weight 149 lb 2 08 oz     Physical Exam    Constitutional   General appearance: No acute distress, well appearing and well nourished  Eyes   Conjunctiva and lids: No swelling, erythema or discharge  Pupils and irises: Equal, round and reactive to light  Ears, Nose, Mouth, and Throat   External inspection of ears and nose: Normal     Otoscopic examination: Tympanic membranes translucent with normal light reflex  Canals patent without erythema  Oropharynx: Normal with no erythema, edema, exudate or lesions  Pulmonary   Respiratory effort: No increased work of breathing or signs of respiratory distress  Auscultation of lungs: Clear to auscultation  Cardiovascular   Palpation of heart: Normal PMI, no thrills  Auscultation of heart: Normal rate and rhythm, normal S1 and S2, without murmurs  Examination of extremities for edema and/or varicosities: Normal     Abdomen   Abdomen: Non-tender, no masses  Liver and spleen: No hepatomegaly or splenomegaly  Lymphatic   Palpation of lymph nodes in neck: No lymphadenopathy  Musculoskeletal   Gait and station: Normal     Digits and nails: Normal without clubbing or cyanosis  Inspection/palpation of joints, bones, and muscles: Normal     Skin   Skin and subcutaneous tissue: Normal without rashes or lesions  Neurologic   Cranial nerves: Cranial nerves 2-12 intact  Reflexes: 2+ and symmetric  Sensation: No sensory loss      Psychiatric   Orientation to person, place, and time: Normal     Mood and affect: Normal        Signatures   Electronically signed by : WINSOME Sow ; Jul 27 2016  8:38PM EST                       (Author)

## 2018-01-15 NOTE — MISCELLANEOUS
Message  Return to work or school:   Lelia Harmon is under my professional care  She was seen in my office on 1/25/2016   She is able to return to work on  2/08/2016      No lifting more than 15 pounds          Signatures   Electronically signed by : Marleny Hardin, ; Jan 25 2016 10:37AM EST                       (Author)

## 2018-01-16 NOTE — RESULT NOTES
Verified Results  (1) CBC/ PLT (NO DIFF) [id number] 22:17CZ Taco [facility] Order Number: [id number]     Order Number: [id number]     Test Name Result Flag Reference   HEMATOCRIT 39 7 %  34 8-46 1   HEMOGLOBIN 13 2 g/dL  11 5-15 4   MCHC 33 2 g/dL  31 4-37 4   MCH 31 3 pg  26 8-34 3   MCV 94 fL  82-98   PLATELET COUNT 504 Thousands/uL  149-390   RBC COUNT 4 22 Million/uL  3 81-5 12   RDW 12 8 %  11 6-15 1   WBC COUNT 7 60 Thousand/uL  4 31-10 16   MPV 11 0 fL  8 9-12 7     (1) TSH WITH FT4 REFLEX [id number] 39:28HH Taco [facility] Order Number: [id number]    Patients undergoing fluorescein dye angiography may retain small amounts of fluorescein in the body for 48-72 hours post procedure  Samples containing fluorescein can produce falsely depressed TSH values  If the patient had this procedure,a specimen should be resubmitted post fluorescein clearance  The recommended reference ranges for TSH during pregnancy are as follows:  First trimester 0 1 to 2 5 uIU/mL  Second trimester  0 2 to 3 0 uIU/mL  Third trimester 0 3 to 3 0 uIU/m     Test Name Result Flag Reference   TSH 2 470 uIU/mL  0 358-3 740     (1) FERRITIN [id number] 81:38NS Taco [facility] Order Number: [id number]     Test Name Result Flag Reference   FERRITIN 25 ng/mL  8-388     (1) IRON [id number] 26:39IH Taco [facility] Order Number: [id number]     Test Name Result Flag Reference   IRON 75 ug/dL       (1) VITAMIN D 25-HYDROXY [id number] 61:51SB [place] Order Number: [id number]     Order Number: [id number]     Test Name Result Flag Reference   VIT D 25-HYDROX 12 4 ng/mL L 30 0-100 0       Plan  Vitamin D deficiency    · Vitamin D3 44700 UNIT Oral Capsule;  Take 1 capsule weekly for 8 weeks

## 2018-01-16 NOTE — PROGRESS NOTES
Assessment    1  History of Vaginal Hysterectomy   2  History of Arthrosc Endoscopic Plantar Fasciotomy   3  Family history of atrial fibrillation (V17 49) (Z82 49) : Brother   4  Family history of hypothyroidism (V18 19) (Z83 49) : Sister   5  Abnormal weight gain (783 1) (R63 5)   6  Fatigue (780 79) (R53 83)   7  Encounter for preventive health examination (V70 0) (Z00 00)    Plan  Abnormal weight gain, Fatigue    · (1) TSH WITH FT4 REFLEX; Status:Active; Requested for:11Aug2017; Health Maintenance    · Zostavax 91809 UNT/0 65ML Subcutaneous Solution Reconstituted (Zostavax  43617 UNT/0 65ML Subcutaneous Solution Reconstituted); adm  one dose as  directed    Discussion/Summary  cervical cancer screening is current cervical cancer screening is needed every year cervical cancer screening is managed by [facility] Breast cancer screening: the risks and benefits of breast cancer screening were discussed, the next mammogram is due Now and breast cancer screening is managed by [facility]  Colorectal cancer screening: the risks and benefits of colorectal cancer screening were discussed and colorectal cancer screening is not indicated  Osteoporosis screening: the risks and benefits of osteoporosis screening were discussed  She was advised to be evaluated by an optometrist  Advice and education were given regarding nutrition, aerobic exercise, weight bearing exercise and weight loss  Patient discussion: discussed with the patient  Hepatitis C Screening: the patient was counseled on Hepatitis C screening  History of Present Illness  HM, Adult Female: The patient is being seen for a health maintenance evaluation  General Health: The patient's health since the last visit is described as good  She has regular dental visits  She denies vision problems  She denies hearing loss  Immunizations status: up to date  Lifestyle:  She consumes a diverse and healthy diet  She does not have any weight concerns   She exercises regularly  She does not use tobacco  She denies alcohol use  She denies drug use  Reproductive health:  she reports normal menses  Screening: cancer screening reviewed and current  metabolic screening reviewed and current  risk screening reviewed and current  Review of Systems    Constitutional: recent weight gain and feeling tired, but no fever and no chills  Eyes: No complaints of eye pain, no red eyes, no eyesight problems, no discharge, no dry eyes, no itching of eyes  ENT: no complaints of earache, no loss of hearing, no nose bleeds, no nasal discharge, no sore throat, no hoarseness  Cardiovascular: No complaints of slow heart rate, no fast heart rate, no chest pain, no palpitations, no leg claudication, no lower extremity edema  Respiratory: No complaints of shortness of breath, no wheezing, no cough, no SOB on exertion, no orthopnea, no PND  Gastrointestinal: No complaints of abdominal pain, no constipation, no nausea or vomiting, no diarrhea, no bloody stools  Genitourinary: No complaints of dysuria, no incontinence, no pelvic pain, no dysmenorrhea, no vaginal discharge or bleeding  Musculoskeletal: No complaints of arthralgias, no myalgias, no joint swelling or stiffness, no limb pain or swelling  Active Problems    1  Acute rhinitis (460) (J00)   2  Acute serous otitis media of left ear, recurrence not specified (381 01) (H65 02)   3  Anxiety (300 00) (F41 9)   4  History of Bunion, right foot (727 1) (M21 611)   5  Common migraine without aura (346 10) (G43 009)   6  Ear pain, left (388 70) (H92 02)   7  Encounter for screening mammogram for malignant neoplasm of breast (V76 12)   (Z12 31)   8  Fatigue (780 79) (R53 83)   9  Neck muscle spasm (728 85) (M62 838)   10  Neck pain, musculoskeletal (723 1) (M54 2)   11  Need for influenza vaccination (V04 81) (Z23)   12  Paresthesia of right arm (782 0) (R20 2)   13  Plantar fasciitis (728 71) (M72 2)   14   Postoperative visit (V58 49) (Z48 89)   15  History of Right carpal tunnel syndrome (354 0) (G56 01)   16  Symptomatic menopausal or female climacteric states (627 2) (N95 1)   17  Upper back pain (724 5) (M54 9)   18  Urinary frequency (788 41) (R35 0)   19  Urinary tract infection (599 0) (N39 0)   20  Vitamin D deficiency (268 9) (E55 9)   21  Well female exam with routine gynecological exam (V72 31) (Z01 419)    Past Medical History    · History of Abnormal uterine bleeding (AUB) (626 9) (N93 9)   · History of Bunion, right foot (727 1) (M21 611)   · History of Encounter for routine gynecological examination (V72 31) (Z01 419)   · History of Functional murmur (R01 0)   · History of cardiac disorder (V12 50) (Z86 79)   · History of migraine (V12 49) (Z86 69)   · History of urinary tract infection (V13 02) (Z87 440)   · History of urticaria (V13 3) (Z87 2)   · History of Lightheadedness (780 4) (R42)   · History of Perimenopause (627 2) (N95 1)   · History of Right carpal tunnel syndrome (354 0) (G56 01)   · History of Screening for HPV (human papillomavirus) (V73 81) (Z11 51)   · History of Subserous leiomyoma of uterus (218 2) (D25 2)   · History of Uterine leiomyoma, unspecified location (218 9) (D25 9)   · History of Visit for screening mammogram (V76 12) (Z12 31)    The past medical history was reviewed and updated today  Surgical History    · History of Ablation Of Vaginal Lesion(S)   · History of Arthrosc Endoscopic Plantar Fasciotomy   · History of Laparoscopy With Total Hysterectomy   · History of Neuroplasty Decompression Median Nerve At Carpal Tunnel   · History of Oviductal Surgery Bilateral Salpingectomy   · History of Tubal Ligation   · History of Vaginal Hysterectomy    The surgical history was reviewed and updated today         Family History  Mother    · Family history of diabetes mellitus (V18 0) (Z83 3)   · Family history of malignant neoplasm of uterus (V16 49) (Z80 49)   · Family history of obesity (V18 19) (Z83 49)  Father    · Family history of chronic obstructive pulmonary disease (V17 6) (Z82 5)  Sister    · Family history of hypothyroidism (V18 19) (Z83 49)  Brother    · Family history of atrial fibrillation (V17 49) (Z82 49)  Maternal Grandmother    · Family history of congestive heart failure (V17 49) (Z82 49)  Paternal Grandmother    · Family history of myocardial infarction (V17 3) (Z82 49)  Maternal Grandfather    · Family history of congestive heart failure (V17 49) (Z82 49)  Family History    · Family history of diabetes mellitus (V18 0) (Z83 3)   · Family history of malignant neoplasm (V16 9) (Z80 9)    The family history was reviewed and updated today  Social History    · Alcohol use (V49 89) (Z78 9)   · Caffeine use (V49 89) (F15 90)   · Coffee   · Exercise: Walking   ·    · Never a smoker   · Occasional alcohol use   ·  (V61 03) (Z63 5)   · Sexually active    Current Meds   1  Atorvastatin Calcium 10 MG Oral Tablet; Take 1 tablet daily Recorded   2  Estradiol 0 5 MG Oral Tablet; TAKE 1 TABLET DAILY; Therapy: [id number] to ([id number]:[id number])  Requested for: 27Jun2017; Last   [id number] Ordered   3  Meloxicam 7 5 MG Oral Tablet; Therapy: (Deborah Hernandez to Recorded   4  Metoprolol Succinate ER 50 MG Oral Tablet Extended Release 24 Hour; TAKE 1 TABLET   DAILY  Requested for: 55FUA9205; Last [id number] Ordered   5  Progesterone Micronized 100 MG Oral Capsule; take 1 capsule daily; Therapy: 31OUG3337 to (Last QA:[id number])  Requested for: 27Jun2017 Ordered    The medication list was reviewed and updated today  Allergies    1  Levaquin TABS    2  No Known Environmental Allergies   3   No Known Food Allergies    Vitals   Recorded: 11Aug2017 08:52AM   Heart Rate 78   Respiration 16   Systolic 814   Diastolic 70   Weight 718 lb 8 oz   BMI Calculated 28 26   BSA Calculated 1 71     Physical Exam    Constitutional   General appearance: No acute distress, well appearing and well nourished  Eyes   Conjunctiva and lids: No swelling, erythema or discharge  Pupils and irises: Equal, round and reactive to light  Ears, Nose, Mouth, and Throat   External inspection of ears and nose: Normal     Otoscopic examination: Tympanic membranes translucent with normal light reflex  Canals patent without erythema  Oropharynx: Normal with no erythema, edema, exudate or lesions  Pulmonary   Respiratory effort: No increased work of breathing or signs of respiratory distress  Auscultation of lungs: Clear to auscultation  Cardiovascular   Palpation of heart: Normal PMI, no thrills  Auscultation of heart: Normal rate and rhythm, normal S1 and S2, without murmurs  Examination of extremities for edema and/or varicosities: Normal     Abdomen   Abdomen: Non-tender, no masses  Liver and spleen: No hepatomegaly or splenomegaly  Lymphatic   Palpation of lymph nodes in neck: No lymphadenopathy  Musculoskeletal   Gait and station: Normal     Digits and nails: Normal without clubbing or cyanosis  Inspection/palpation of joints, bones, and muscles: Normal     Skin   Skin and subcutaneous tissue: Normal without rashes or lesions  Neurologic   Cranial nerves: Cranial nerves 2-12 intact  Reflexes: 2+ and symmetric  Sensation: No sensory loss  Psychiatric   Orientation to person, place, and time: Normal     Mood and affect: Normal        Future Appointments    Date/Time Provider Specialty Site   08/21/2018 09:00 AM WINSOME Mccormick   [address] [facility]     Signatures   Electronically signed by : WINSOME Carolina ; Aug 13 2017  9:30PM EST                       (Author)

## 2018-01-22 VITALS
DIASTOLIC BLOOD PRESSURE: 70 MMHG | BODY MASS INDEX: 28.26 KG/M2 | HEART RATE: 78 BPM | RESPIRATION RATE: 16 BRPM | WEIGHT: 154.5 LBS | SYSTOLIC BLOOD PRESSURE: 108 MMHG

## 2018-01-23 VITALS
SYSTOLIC BLOOD PRESSURE: 122 MMHG | BODY MASS INDEX: 27.12 KG/M2 | WEIGHT: 148.25 LBS | RESPIRATION RATE: 16 BRPM | DIASTOLIC BLOOD PRESSURE: 90 MMHG | HEART RATE: 64 BPM

## 2018-01-23 NOTE — MISCELLANEOUS
Message  Return to work or school:   Theodore Patel is under my professional care   She was seen in my office on today   She is able to return to work on  12/26/2017            Signatures   Electronically signed by : Diana Messer OM; Dec 21 2017 11:23AM EST                       (Author)

## 2018-02-08 ENCOUNTER — TRANSCRIBE ORDERS (OUTPATIENT)
Dept: ADMINISTRATIVE | Facility: HOSPITAL | Age: 53
End: 2018-02-08

## 2018-02-08 ENCOUNTER — HOSPITAL ENCOUNTER (OUTPATIENT)
Dept: NON INVASIVE DIAGNOSTICS | Facility: CLINIC | Age: 53
Discharge: HOME | End: 2018-02-08
Payer: COMMERCIAL

## 2018-02-08 DIAGNOSIS — R00.2 PALPITATIONS: ICD-10-CM

## 2018-02-08 PROCEDURE — 93226 XTRNL ECG REC<48 HR SCAN A/R: CPT

## 2018-02-08 PROCEDURE — 93225 XTRNL ECG REC<48 HRS REC: CPT

## 2018-02-12 PROCEDURE — 93227 XTRNL ECG REC<48 HR R&I: CPT | Performed by: INTERNAL MEDICINE

## 2018-02-13 NOTE — PROGRESS NOTES
Call to patient; left a voicemail to return our call  Please see previous msg in regard to Holter Monitor

## 2018-02-14 DIAGNOSIS — R00.2 PALPITATIONS: ICD-10-CM

## 2018-02-14 DIAGNOSIS — I47.1 PSVT (PAROXYSMAL SUPRAVENTRICULAR TACHYCARDIA) (HCC): Primary | ICD-10-CM

## 2018-02-14 NOTE — TELEPHONE ENCOUNTER
Patient has requested Metoprolol to go to [facility]  Task sent to Dr Rosamaria Calderon

## 2018-02-14 NOTE — PROGRESS NOTES
Patient informed of Holter results and has scheduled an appt   For a follow up in March with Dr Luis M Livingston

## 2018-02-15 RX ORDER — METOPROLOL SUCCINATE 50 MG/1
50 TABLET, EXTENDED RELEASE ORAL DAILY
Qty: 90 TABLET | Refills: 3 | Status: SHIPPED | OUTPATIENT
Start: 2018-02-15 | End: 2019-02-18 | Stop reason: SDUPTHER

## 2018-03-09 ENCOUNTER — OFFICE VISIT (OUTPATIENT)
Dept: CARDIOLOGY CLINIC | Facility: CLINIC | Age: 53
End: 2018-03-09
Payer: COMMERCIAL

## 2018-03-09 VITALS
WEIGHT: 144 LBS | HEART RATE: 76 BPM | BODY MASS INDEX: 26.5 KG/M2 | DIASTOLIC BLOOD PRESSURE: 74 MMHG | OXYGEN SATURATION: 98 % | HEIGHT: 62 IN | SYSTOLIC BLOOD PRESSURE: 126 MMHG

## 2018-03-09 DIAGNOSIS — E66.3 OVERWEIGHT (BMI 25.0-29.9): ICD-10-CM

## 2018-03-09 DIAGNOSIS — R00.2 HEART PALPITATIONS: ICD-10-CM

## 2018-03-09 DIAGNOSIS — E78.5 DYSLIPIDEMIA: ICD-10-CM

## 2018-03-09 DIAGNOSIS — Z86.79 HISTORY OF PAROXYSMAL SUPRAVENTRICULAR TACHYCARDIA: ICD-10-CM

## 2018-03-09 DIAGNOSIS — I49.9 IRREGULAR HEART BEATS: Primary | ICD-10-CM

## 2018-03-09 PROCEDURE — 93000 ELECTROCARDIOGRAM COMPLETE: CPT | Performed by: INTERNAL MEDICINE

## 2018-03-09 PROCEDURE — 99214 OFFICE O/P EST MOD 30 MIN: CPT | Performed by: INTERNAL MEDICINE

## 2018-03-09 NOTE — PROGRESS NOTES
Cardiology Progress Note    ASSESSMENT:    1 Much improved  palpitations, mild in severity, with history of AVNRT/PSVT diagnosed in   2  Controlled dyslipidemia with prior 1 1% 10 year and 38 8% lifetime ASCVD risks  3  History of chest pain with normal nuclear stress test on 16   4  18 8 pound weight gain since  and 7 9 pound weight loss in approximately 6 months  5  Mild overweight        Plan       Patient Instructions     1  Continue present medications  2   Cardiology follow-up in 6 months with EKG, lipids, CMP  HPI    This 50 y o  female  denies new cardiopulmonary and medical symptoms  She still uses her exercise stationary bike 20 minutes daily in the mornings  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[employer], working mainly with [address], Michael, and Marrian Severe  She has still not finalized her divorce settlement, but anticipates that it should be done soon  Review of Systems    All other systems negative, except as noted in history of present illness    Historical Information   Past Medical History:   Diagnosis Date    Functional heart murmur in      H/O cardiac disorder     Heart beat abnormality     Heart palpitations     History of paroxysmal supraventricular tachycardia     History of urinary tract infection     Hx of bleeding disorder     abnormal uterine bleeding    Hx of migraines     Irregular heart beat     Lightheadedness     Wears contact lenses     Wears glasses      Past Surgical History:   Procedure Laterality Date    CARPAL TUNNEL RELEASE      FOOT SURGERY      HERNIA REPAIR      pt was 5months old    HYSTERECTOMY      LASER ABLATION N/A     vaginal lesion(s)    MOLE EXCISION      MN ANKLE SCOPE,PLANTAR FASCIOTOMY Right 2017    Procedure: RELEASE FASCIA PLANTAR/FASCIOTOMY ENDOSCOPIC (EPF);   Surgeon: Raul Avila DPM;  [facility]: [facility];  Service: Podiatry    MN CYSTOURETHROSCOPY N/A 2016 Procedure: CYSTOSCOPY;  Surgeon: Jacobo Goldberg, DO;  [facility];  Service: Gynecology    KS LAPAROSCOPY W TOT HYSTERECTUTERUS <=250 GRAM  W TUBE/OVARY N/A 1/12/2016    Procedure: TOTAL LAPAROSCOPIC HYSTERECTOMY ;  Surgeon: Jacobo Goldberg, DO;  [facility];  Service: Gynecology    KS WRIST Lele Soles LIG Right 1/29/2016    Procedure: ENDOSCOPIC CARPAL TUNNEL RELEASE;  Surgeon: Gold Echevarria MD;  [facility];  Service: Orthopedics    SALPINGECTOMY Bilateral 1/12/2016    Procedure: REMOVAL OF BOTH FALLOPIAN TUBES;  Surgeon: Jacobo Goldberg, DO;  [facility];  Service:     TUBAL LIGATION       History   Alcohol Use    1 2 oz/week    2 Glasses of wine per week     Comment: occasional alcohol use     History   Drug Use No     History   Smoking Status    Never Smoker   Smokeless Tobacco    Never Used       Family History:  History reviewed  No pertinent family history  Meds/Allergies     Prior to Admission medications    Medication Sig Start Date End Date Taking? Authorizing Provider   atorvastatin (LIPITOR) 10 mg tablet Take 10 mg by mouth every morning     Yes Historical Provider, MD   CRANBERRY PO Take 1 tablet by mouth daily     Yes Historical Provider, MD   estradiol (ESTRACE) 0 5 MG tablet Take 0 5 mg by mouth daily   Yes Historical Provider, MD   metoprolol succinate (TOPROL-XL) 50 mg 24 hr tablet Take 1 tablet (50 mg total) by mouth daily 2/15/18  Yes Kusum Burton MD   Multiple Vitamins-Minerals (MULTIVITAMIN ADULT PO) Take 1 tablet by mouth every morning     Yes Historical Provider, MD   PROGESTERONE MICRONIZED PO Take 100 mg by mouth daily at bedtime   Yes Historical Provider, MD   vitamin E 100 UNIT capsule Take 100 Units by mouth daily     Yes Historical Provider, MD       Allergies   Allergen Reactions    Levaquin [Levofloxacin] Anaphylaxis and Swelling     L         Vitals:    03/09/18 0757   BP: 126/74   BP Location: Left arm   Patient Position: Sitting   Cuff Size: Adult   Pulse: 76   SpO2: 98%   Weight: 65 3 kg (144 lb)   Height: 5' 2" (1 575 m)       Body mass index is 26 34 kg/m²  7 9 pound weight loss in approximately 6 months    Physical Exam:    General Appearance:  Alert, cooperative, no distress, appears stated age   Head:  Normocephalic, without obvious abnormality, atraumatic   Eyes:  PERRL, conjunctiva/corneas clear, EOM's intact,   both eyes   Ears:  Normal TM's and external ear canals, both ears   Nose: Nares normal, septum midline, mucosa normal, no drainage or sinus tenderness   Throat: Lips, mucosa, and tongue normal; teeth and gums normal   Neck: Supple, symmetrical, trachea midline, no adenopathy, thyroid: not enlarged, symmetric, no tenderness/mass/nodules, no carotid bruit or JVD   Back:   Symmetric, no curvature, ROM normal, no CVA tenderness   Lungs:   Clear to auscultation bilaterally, respirations unlabored   Chest Wall:  No tenderness or deformity   Heart:  Regular rate and rhythm, S1, S2 normal, no murmur, rub or gallop   Abdomen:   Soft, non-tender, bowel sounds active all four quadrants,  no masses, no organomegaly   Extremities: Extremities normal, atraumatic, no cyanosis or edema   Pulses: 2+ and symmetric   Skin: Skin showed normal color, texture, turgor and no rashes or lesions   Lymph nodes: Cervical, supraclavicular, and axillary nodes normal   Neurologic: Normal         Cardiographics    ECG 03/09/2018:    Normal EKG  Unchanged from 09/26/17    24 hour Holter monitor 02/08/2018:    817 PACs with no PVCs  No atrial fibrillation, flutter, or PSVT  Average heart rate 82 bpm    Imaging    Chest X-Ray :  No Chest XR results available for this patient            Lab Review       Lab Results   Component Value Date     03/19/2015    K 4 6 03/19/2015     03/19/2015    CO2 26 03/19/2015    ANIONGAP 8 03/19/2015    BUN 14 03/19/2015    CREATININE 0 81 03/19/2015    GLUCOSE 83 03/19/2015    GLUF 94 07/17/2014 CALCIUM 8 9 03/19/2015    AST 16 11/04/2016    ALT 31 11/04/2016    ALKPHOS 61 11/04/2016    PROT 7 9 11/04/2016    BILITOT 0 57 11/04/2016       Lab Results   Component Value Date    CHOL 186 07/01/2017    CHOL 149 11/04/2016    CHOL 251 (H) 06/02/2016     Lab Results   Component Value Date    HDL 76 (H) 07/01/2017    HDL 73 (H) 11/04/2016    HDL 65 (H) 06/02/2016     Lab Results   Component Value Date    LDLCALC 88 07/01/2017    LDLCALC 54 11/04/2016    LDLCALC 162 (H) 06/02/2016     Lab Results   Component Value Date    TRIG 111 07/01/2017    TRIG 109 11/04/2016    TRIG 122 06/02/2016     No components found for: CHOLHDL    Lab Results   Component Value Date    GLUCOSE 83 03/19/2015    CALCIUM 8 9 03/19/2015     03/19/2015    K 4 6 03/19/2015    CO2 26 03/19/2015     03/19/2015    BUN 14 03/19/2015    CREATININE 0 81 03/19/2015           Jeana Felix MD

## 2018-03-23 ENCOUNTER — OFFICE VISIT (OUTPATIENT)
Dept: FAMILY MEDICINE CLINIC | Facility: CLINIC | Age: 53
End: 2018-03-23
Payer: COMMERCIAL

## 2018-03-23 VITALS
SYSTOLIC BLOOD PRESSURE: 122 MMHG | TEMPERATURE: 98.4 F | RESPIRATION RATE: 16 BRPM | DIASTOLIC BLOOD PRESSURE: 70 MMHG | HEART RATE: 78 BPM

## 2018-03-23 DIAGNOSIS — M76.61 ACHILLES TENDINITIS OF RIGHT LOWER EXTREMITY: Primary | ICD-10-CM

## 2018-03-23 PROCEDURE — 20550 NJX 1 TENDON SHEATH/LIGAMENT: CPT | Performed by: FAMILY MEDICINE

## 2018-03-23 NOTE — PROGRESS NOTES
Recurrent pain right Achilles tendon most likely triggered by right plantar fasciitis  No longer on Meloxicam  Current medications reviewed  PE    Tenderness and mild thickening right mid Achilles tendon  Full ROM right foot/ankle  Negative Reyna test      Procedure note Tendon sheath injection  Diagnosis right Achilles tendonitis  Using aseptic technique I performed injections right mid Achilles tendon medial and  laterally with Sarapin 2 mL/lidocaine 1% mL  without epi  Patient tolerated procedure without problems  No complications  Plan  Consider physical therapy and/or acupuncture for persistent symptoms

## 2018-06-08 ENCOUNTER — OFFICE VISIT (OUTPATIENT)
Dept: FAMILY MEDICINE CLINIC | Facility: CLINIC | Age: 53
End: 2018-06-08
Payer: COMMERCIAL

## 2018-06-08 VITALS
HEART RATE: 62 BPM | HEIGHT: 63 IN | DIASTOLIC BLOOD PRESSURE: 78 MMHG | WEIGHT: 145.8 LBS | TEMPERATURE: 97.6 F | RESPIRATION RATE: 16 BRPM | BODY MASS INDEX: 25.83 KG/M2 | SYSTOLIC BLOOD PRESSURE: 124 MMHG

## 2018-06-08 DIAGNOSIS — R22.0 SWELLING OF UPPER LIP: ICD-10-CM

## 2018-06-08 DIAGNOSIS — M25.562 ACUTE PAIN OF LEFT KNEE: Primary | ICD-10-CM

## 2018-06-08 PROCEDURE — 99213 OFFICE O/P EST LOW 20 MIN: CPT | Performed by: FAMILY MEDICINE

## 2018-06-08 NOTE — PROGRESS NOTES
Assessment/Plan:     Diagnoses and all orders for this visit:    Acute pain of left knee    Swelling of upper lip    Other orders  -     Cranberry-Vit C-Lactobacillus (CRANBERRY/PROBIOTIC/VIT C) 450-30 MG TABS; Take 1 tablet by mouth daily        Etiology of symptoms not clear  Observe for now  Advised to call if any changes  Patient ID: Collins Rosenberg is a 48 y o  female  Patient recently returned from a trip to [facility] 05/23 to 05/28  Loose stools/diarrhea early during vacation  Symptoms resolved  She developed pain and swelling of right foot which has resolved  over the last several days she has had intermittent left knee pain  no swelling  episode of lip swelling yesterday  No rashes  The following portions of the patient's history were reviewed and updated as appropriate: allergies, current medications, past family history, past medical history, past social history, past surgical history and problem list     Review of Systems   Constitutional: Positive for fatigue  Negative for appetite change, chills, fever and unexpected weight change  HENT: Negative for congestion, ear pain and rhinorrhea  Respiratory: Negative for cough, shortness of breath and wheezing  Cardiovascular: Negative for chest pain, palpitations and leg swelling  Gastrointestinal:        See HPI   Genitourinary: Negative for difficulty urinating and dysuria  Musculoskeletal:        See HPI   Neurological: Negative for dizziness and headaches  Hematological: Negative for adenopathy  Objective:      /78   Pulse 62   Temp 97 6 °F (36 4 °C)   Resp 16   Ht 5' 3" (1 6 m)   Wt 66 1 kg (145 lb 12 8 oz)   BMI 25 83 kg/m²          Physical Exam   Constitutional: She appears well-developed and well-nourished  No distress  HENT:   Right Ear: Tympanic membrane normal    Left Ear: Tympanic membrane normal    Mouth/Throat: Oropharynx is clear and moist and mucous membranes are normal  No oral lesions  Normal dentition  No lip swelling   Eyes: Conjunctivae are normal    Neck: No thyromegaly present  Cardiovascular: Normal rate, regular rhythm and normal heart sounds  Exam reveals no gallop  No murmur heard  Pulmonary/Chest: Effort normal and breath sounds normal  She has no wheezes  She has no rales  Musculoskeletal:        Left knee: She exhibits normal range of motion, no swelling, no effusion, no ecchymosis, no erythema, no LCL laxity, normal patellar mobility, no bony tenderness, normal meniscus and no MCL laxity  No medial joint line, no lateral joint line, no MCL, no LCL and no patellar tendon tenderness noted  Joint exam normal   Lymphadenopathy:     She has no cervical adenopathy  Skin: No rash noted

## 2018-07-02 ENCOUNTER — TELEPHONE (OUTPATIENT)
Dept: OBGYN CLINIC | Facility: CLINIC | Age: 53
End: 2018-07-02

## 2018-07-02 DIAGNOSIS — N95.1 SYMPTOMATIC MENOPAUSAL OR FEMALE CLIMACTERIC STATES: Primary | ICD-10-CM

## 2018-07-02 RX ORDER — ESTRADIOL 0.5 MG/1
0.5 TABLET ORAL DAILY
Qty: 90 TABLET | Refills: 0 | Status: SHIPPED | OUTPATIENT
Start: 2018-07-02 | End: 2019-07-02 | Stop reason: SDUPTHER

## 2018-07-02 NOTE — TELEPHONE ENCOUNTER
Patient call for refill on medications  Currently taking  Estradiol 0 5 MG Oral Tablet (Estrace); TAKE 1 TABLET DAILY menopausal or female climacteric states     Progesterone Micronized 100 MG Oral Capsule; take 1 capsule daily: Symptomatic menopausal or female climacteric states    Last annual   5/1/17    Next annual   9/10/18    Pharmacy updated  Routing to provider

## 2018-08-03 ENCOUNTER — TELEPHONE (OUTPATIENT)
Dept: CARDIOLOGY CLINIC | Facility: CLINIC | Age: 53
End: 2018-08-03

## 2018-08-03 NOTE — TELEPHONE ENCOUNTER
Unless she is having new symptoms of an acute nature with her vision or new cardiopulmonary symptoms, she can wait till her September appointment  I can find nothing in her last notes that would raise the question of any vascular issue

## 2018-08-07 ENCOUNTER — APPOINTMENT (OUTPATIENT)
Dept: LAB | Facility: CLINIC | Age: 53
End: 2018-08-07
Payer: COMMERCIAL

## 2018-08-07 ENCOUNTER — TRANSCRIBE ORDERS (OUTPATIENT)
Dept: LAB | Facility: CLINIC | Age: 53
End: 2018-08-07

## 2018-08-07 DIAGNOSIS — Z86.79 HISTORY OF PAROXYSMAL SUPRAVENTRICULAR TACHYCARDIA: ICD-10-CM

## 2018-08-07 DIAGNOSIS — Z00.8 HEALTH EXAMINATION IN POPULATION SURVEY: Primary | ICD-10-CM

## 2018-08-07 DIAGNOSIS — E78.5 DYSLIPIDEMIA: ICD-10-CM

## 2018-08-07 DIAGNOSIS — E66.3 OVERWEIGHT (BMI 25.0-29.9): ICD-10-CM

## 2018-08-07 LAB
ALBUMIN SERPL BCP-MCNC: 3.7 G/DL (ref 3.5–5)
ALP SERPL-CCNC: 52 U/L (ref 46–116)
ALT SERPL W P-5'-P-CCNC: 21 U/L (ref 12–78)
ANION GAP SERPL CALCULATED.3IONS-SCNC: 7 MMOL/L (ref 4–13)
AST SERPL W P-5'-P-CCNC: 15 U/L (ref 5–45)
BILIRUB SERPL-MCNC: 0.41 MG/DL (ref 0.2–1)
BUN SERPL-MCNC: 18 MG/DL (ref 5–25)
CALCIUM SERPL-MCNC: 9 MG/DL (ref 8.3–10.1)
CHLORIDE SERPL-SCNC: 105 MMOL/L (ref 100–108)
CHOLEST SERPL-MCNC: 154 MG/DL (ref 50–200)
CHOLEST SERPL-MCNC: 155 MG/DL (ref 50–200)
CO2 SERPL-SCNC: 27 MMOL/L (ref 21–32)
CREAT SERPL-MCNC: 0.93 MG/DL (ref 0.6–1.3)
EST. AVERAGE GLUCOSE BLD GHB EST-MCNC: 105 MG/DL
GFR SERPL CREATININE-BSD FRML MDRD: 71 ML/MIN/1.73SQ M
GLUCOSE P FAST SERPL-MCNC: 87 MG/DL (ref 65–99)
HBA1C MFR BLD: 5.3 % (ref 4.2–6.3)
HDLC SERPL-MCNC: 62 MG/DL (ref 40–60)
HDLC SERPL-MCNC: 62 MG/DL (ref 40–60)
LDLC SERPL CALC-MCNC: 66 MG/DL (ref 0–100)
LDLC SERPL CALC-MCNC: 67 MG/DL (ref 0–100)
NONHDLC SERPL-MCNC: 92 MG/DL
NONHDLC SERPL-MCNC: 93 MG/DL
POTASSIUM SERPL-SCNC: 4.3 MMOL/L (ref 3.5–5.3)
PROT SERPL-MCNC: 7.5 G/DL (ref 6.4–8.2)
SODIUM SERPL-SCNC: 139 MMOL/L (ref 136–145)
TRIGL SERPL-MCNC: 129 MG/DL
TRIGL SERPL-MCNC: 130 MG/DL

## 2018-08-07 PROCEDURE — 83036 HEMOGLOBIN GLYCOSYLATED A1C: CPT

## 2018-08-07 PROCEDURE — 80053 COMPREHEN METABOLIC PANEL: CPT

## 2018-08-07 PROCEDURE — 80061 LIPID PANEL: CPT

## 2018-08-07 PROCEDURE — 36415 COLL VENOUS BLD VENIPUNCTURE: CPT

## 2018-08-21 ENCOUNTER — OFFICE VISIT (OUTPATIENT)
Dept: FAMILY MEDICINE CLINIC | Facility: MEDICAL CENTER | Age: 53
End: 2018-08-21
Payer: COMMERCIAL

## 2018-08-21 VITALS
BODY MASS INDEX: 27.44 KG/M2 | HEIGHT: 62 IN | SYSTOLIC BLOOD PRESSURE: 136 MMHG | WEIGHT: 149.13 LBS | DIASTOLIC BLOOD PRESSURE: 84 MMHG | RESPIRATION RATE: 14 BRPM | HEART RATE: 80 BPM

## 2018-08-21 DIAGNOSIS — Z86.79 HISTORY OF PAROXYSMAL SUPRAVENTRICULAR TACHYCARDIA: ICD-10-CM

## 2018-08-21 DIAGNOSIS — Z00.00 PREVENTATIVE HEALTH CARE: ICD-10-CM

## 2018-08-21 DIAGNOSIS — Z12.11 SCREENING FOR MALIGNANT NEOPLASM OF COLON: Primary | ICD-10-CM

## 2018-08-21 PROBLEM — R00.2 HEART PALPITATIONS: Status: RESOLVED | Noted: 2018-03-09 | Resolved: 2018-08-21

## 2018-08-21 PROCEDURE — 99396 PREV VISIT EST AGE 40-64: CPT | Performed by: FAMILY MEDICINE

## 2018-08-21 PROCEDURE — 99213 OFFICE O/P EST LOW 20 MIN: CPT | Performed by: FAMILY MEDICINE

## 2018-08-22 NOTE — PROGRESS NOTES
Patient is a pleasant 57-year-old  She works as a medical assistant at one of the [employer]  She has a significant other whom she lives with  She has adult children and she is a grandmother herself  She gets routine screening from her gynecologist who is treating her with hormone replacement treatment because of a total hysterectomy        She takes metoprolol for her history of SVT  She has had recent lipid and A1c screening done  It is normal     Past medical history, past surgical history, family medical history and social history all reviewed    Review of Systems - General ROS: negative  Ophthalmic ROS: negative  ENT ROS: negative  Allergy and Immunology ROS: negative  Hematological and Lymphatic ROS: negative  Endocrine ROS: negative  Breast ROS: negative for breast lumps  negative  Respiratory ROS: no cough, shortness of breath, or wheezing  Cardiovascular ROS: no chest pain or dyspnea on exertion  Gastrointestinal ROS: no abdominal pain, change in bowel habits, or black or bloody stools  Genito-Urinary ROS: no dysuria, trouble voiding, or hematuria  Musculoskeletal ROS: negative  Neurological ROS: no TIA or stroke symptoms  Dermatological ROS: negative    /84   Pulse 80   Resp 14   Ht 5' 2" (1 575 m)   Wt 67 6 kg (149 lb 2 oz)   BMI 27 28 kg/m²     HEENT examination is within normal limits no acute findings  Neck was supple  Chest clear  Cardiac exam revealed a regular rate and rhythm without murmur rub or gallop  Abdomen is soft and nontender  She is due for colonoscopy and she is referred to colorectal surgery  Continue routine follow-up  Return next year for preventative health valuation  Also follow up with gyn

## 2018-09-04 ENCOUNTER — OFFICE VISIT (OUTPATIENT)
Dept: CARDIOLOGY CLINIC | Facility: CLINIC | Age: 53
End: 2018-09-04
Payer: COMMERCIAL

## 2018-09-04 VITALS
HEART RATE: 80 BPM | HEIGHT: 63 IN | SYSTOLIC BLOOD PRESSURE: 142 MMHG | BODY MASS INDEX: 25.69 KG/M2 | WEIGHT: 145 LBS | OXYGEN SATURATION: 98 % | DIASTOLIC BLOOD PRESSURE: 90 MMHG

## 2018-09-04 DIAGNOSIS — Z86.79 HISTORY OF PSVT (PAROXYSMAL SUPRAVENTRICULAR TACHYCARDIA): ICD-10-CM

## 2018-09-04 DIAGNOSIS — E66.3 OVERWEIGHT (BMI 25.0-29.9): ICD-10-CM

## 2018-09-04 DIAGNOSIS — E78.5 DYSLIPIDEMIA: ICD-10-CM

## 2018-09-04 DIAGNOSIS — R00.2 INTERMITTENT PALPITATIONS: Primary | ICD-10-CM

## 2018-09-04 PROCEDURE — 93000 ELECTROCARDIOGRAM COMPLETE: CPT | Performed by: INTERNAL MEDICINE

## 2018-09-04 PROCEDURE — 99214 OFFICE O/P EST MOD 30 MIN: CPT | Performed by: INTERNAL MEDICINE

## 2018-09-04 RX ORDER — ATORVASTATIN CALCIUM 20 MG/1
TABLET, FILM COATED ORAL
COMMUNITY
Start: 2018-08-27 | End: 2018-12-06 | Stop reason: SDUPTHER

## 2018-09-04 NOTE — PROGRESS NOTES
Cardiology Progress Note    ASSESSMENT:    1 Much improved palpitations overall, with flare up 3 weeks ago, and history of AVNRT/PSVT diagnosed in   2  Controlled dyslipidemia with prior 1 1% 10 year and 38 8% lifetime ASCVD risks  3  History of chest pain with normal nuclear stress test on 16   4  19 8 pound weight gain since  and 6 9 pound weight loss in approximately 1 year  5   Mild overweight  6  White coat hypertension of recent onset  7   Reported cotton wool exudates on eye examination, to be confirmed  Plan       Patient Instructions     1  Continue present medications  2   Cardiology follow-up in 6 months with EKG and no lab work  3   Patient instructed to call us if palpitations increase in frequency or duration  HPI    This 48 y o  female  denies new medical symptoms  Approximately 3 weeks ago, she had persistent heart palpitations while at work with a blood pressure 152/90  She has had no palpitations before that or since that time and is unsure regarding her subsequent blood pressure  In , she was told she had cotton wool spots on her eye exam   She has follow-up with the eye doctor in   The patient has been using her stationary bicycle daily for about 1 year  She still works as a medical assistant at [employer], working with Kayy Barnhart, and Stephen Anthony  Her divorce proceedings have progressed but not all of the paperwork is finalized      Review of Systems    All other systems negative, except as noted in history of present illness    Historical Information   Past Medical History:   Diagnosis Date    Carpal tunnel syndrome     Right; last assessed 16    Functional heart murmur in      H/O cardiac disorder     Heart beat abnormality     Heart palpitations     History of paroxysmal supraventricular tachycardia     History of urinary tract infection     Hx of bleeding disorder abnormal uterine bleeding    Hx of migraines     Irregular heart beat     Lightheadedness     Uterine leiomyoma     resolved; 01/25/16    Wears contact lenses     Wears glasses      Past Surgical History:   Procedure Laterality Date    CARPAL TUNNEL RELEASE      FOOT SURGERY      HERNIA REPAIR      pt was 5months old    HYSTERECTOMY      LASER ABLATION N/A     vaginal lesion(s)    MOLE EXCISION      OH ANKLE SCOPE,PLANTAR FASCIOTOMY Right 7/21/2017    Procedure: RELEASE FASCIA PLANTAR/FASCIOTOMY ENDOSCOPIC (EPF);   Surgeon: Kirsten Young DPM;  [facility];  Service: Podiatry    OH CYSTOURETHROSCOPY N/A 1/12/2016    Procedure: Evone Isabella;  Surgeon: Radha Kunz DO;  [facility];  Service: Gynecology    OH LAPAROSCOPY W TOT HYSTERECTUTERUS <=250 GRAM  W TUBE/OVARY N/A 1/12/2016    Procedure: TOTAL LAPAROSCOPIC HYSTERECTOMY ;  Surgeon: Radha Kunz DO;  [facility];  Service: Gynecology    OH WRIST Rosary Savannah LIG Right 1/29/2016    Procedure: ENDOSCOPIC CARPAL TUNNEL RELEASE;  Surgeon: Cynthia Reynaga MD;  [facility];  Service: Orthopedics    SALPINGECTOMY Bilateral 1/12/2016    Procedure: REMOVAL OF BOTH FALLOPIAN TUBES;  Surgeon: Radha Kunz DO;  [facility];  Service:     TUBAL LIGATION       History   Alcohol Use    1 2 oz/week    2 Glasses of wine per week     Comment: occasional alcohol use     History   Drug Use No     History   Smoking Status    Never Smoker   Smokeless Tobacco    Never Used       Family History:  Family History   Problem Relation Age of Onset    Diabetes Mother         mellitus    Uterine cancer Mother     Obesity Mother     COPD Father     Hypothyroidism Sister     Atrial fibrillation Brother     Heart failure Maternal Grandmother     Heart failure Maternal Grandfather     Heart attack Paternal Grandmother     Diabetes Family         mellitus    Cancer Family          Meds/Allergies Prior to Admission medications    Medication Sig Start Date End Date Taking? Authorizing Provider   Cranberry-Vit C-Lactobacillus (CRANBERRY/PROBIOTIC/VIT C) 450-30 MG TABS Take 1 tablet by mouth daily   Yes Historical Provider, MD   estradiol (ESTRACE) 0 5 MG tablet Take 1 tablet (0 5 mg total) by mouth daily 7/2/18  Yes Dacry Álvarez DO   metoprolol succinate (TOPROL-XL) 50 mg 24 hr tablet Take 1 tablet (50 mg total) by mouth daily 2/15/18  Yes Donal Akbar MD   progesterone (PROMETRIUM) 100 MG capsule Take 1 capsule (100 mg total) by mouth daily at bedtime 7/2/18  Yes Darcy Álvarez DO   atorvastatin (LIPITOR) 20 mg tablet  8/27/18   Historical Provider, MD   atorvastatin (LIPITOR) 10 mg tablet Take 10 mg by mouth every morning    9/4/18  Historical Provider, MD       Allergies   Allergen Reactions    Levaquin [Levofloxacin] Anaphylaxis and Swelling     L         Vitals:    09/04/18 0851   BP: 142/90                       118/79 left upper extremity, standing, standard cuff, end of exam   BP Location: Left arm   Patient Position: Sitting   Cuff Size: Standard   Pulse: 80   SpO2: 98%   Weight: 65 8 kg (145 lb)   Height: 5' 2 75" (1 594 m)       Body mass index is 25 89 kg/m²    1 pound weight gain in approximately 6 months and 6 9 pound weight loss in approximately 1 year    Physical Exam:    General Appearance:  Alert, cooperative, no distress, appears stated age   Head:  Normocephalic, without obvious abnormality, atraumatic   Eyes:  PERRL, conjunctiva/corneas clear, EOM's intact,   both eyes   Ears:  Normal TM's and external ear canals, both ears   Nose: Nares normal, septum midline, mucosa normal, no drainage or sinus tenderness   Throat: Lips, mucosa, and tongue normal; teeth and gums normal   Neck: Supple, symmetrical, trachea midline, no adenopathy, thyroid: not enlarged, symmetric, no tenderness/mass/nodules, no carotid bruit or JVD   Back:   Symmetric, no curvature, ROM normal, no CVA tenderness Lungs:   Clear to auscultation bilaterally, respirations unlabored   Chest Wall:  No tenderness or deformity   Heart:  Regular rate and rhythm, S1, S2 normal, no murmur, rub or gallop   Abdomen:   Soft, non-tender, bowel sounds active all four quadrants,  no masses, no organomegaly   Extremities: Extremities normal, atraumatic, no cyanosis or edema   Pulses: 2+ and symmetric   Skin: Skin showed normal color, texture, turgor and no rashes or lesions   Lymph nodes: Cervical, supraclavicular, and axillary nodes normal   Neurologic: Normal         Cardiographics    ECG 09/04/2018 :    Normally EKG, similar to 03/09/2018    Imaging    Chest X-Ray :  No Chest XR results available for this patient            Lab Review       Lab Results   Component Value Date     08/07/2018    K 4 3 08/07/2018     08/07/2018    CO2 27 08/07/2018    ANIONGAP 8 03/19/2015    BUN 18 08/07/2018    CREATININE 0 93 08/07/2018    GLUCOSE 83 03/19/2015    GLUF 87 08/07/2018    CALCIUM 9 0 08/07/2018    AST 15 08/07/2018    ALT 21 08/07/2018    ALKPHOS 52 08/07/2018    PROT 7 7 03/19/2015    BILITOT 0 59 03/19/2015    EGFR 71 08/07/2018       Lab Results   Component Value Date    CHOL 212 06/30/2015    CHOL 192 03/19/2015    CHOL 178 07/17/2014     Lab Results   Component Value Date    HDL 62 (H) 08/07/2018    HDL 62 (H) 08/07/2018    HDL 76 (H) 07/01/2017     Lab Results   Component Value Date    LDLCALC 66 08/07/2018    LDLCALC 67 08/07/2018    LDLCALC 88 07/01/2017     Lab Results   Component Value Date    TRIG 130 08/07/2018    TRIG 129 08/07/2018    TRIG 111 07/01/2017     Cholesterol 08/07/2018-154    Lab Results   Component Value Date    GLUCOSE 83 03/19/2015    CALCIUM 9 0 08/07/2018     08/07/2018    K 4 3 08/07/2018    CO2 27 08/07/2018     08/07/2018    BUN 18 08/07/2018    CREATININE 0 93 08/07/2018           Jihan Garcia MD

## 2018-09-04 NOTE — PATIENT INSTRUCTIONS
1   Continue present medications  2   Cardiology follow-up in 6 months with EKG and no lab work  3   Patient instructed to call us if palpitations increase in frequency or duration

## 2018-09-10 ENCOUNTER — ANNUAL EXAM (OUTPATIENT)
Dept: OBGYN CLINIC | Facility: CLINIC | Age: 53
End: 2018-09-10
Payer: COMMERCIAL

## 2018-09-10 VITALS
BODY MASS INDEX: 25.87 KG/M2 | HEIGHT: 63 IN | SYSTOLIC BLOOD PRESSURE: 112 MMHG | DIASTOLIC BLOOD PRESSURE: 64 MMHG | WEIGHT: 146 LBS

## 2018-09-10 DIAGNOSIS — Z01.419 WOMEN'S ANNUAL ROUTINE GYNECOLOGICAL EXAMINATION: Primary | ICD-10-CM

## 2018-09-10 PROCEDURE — 99396 PREV VISIT EST AGE 40-64: CPT | Performed by: OBSTETRICS & GYNECOLOGY

## 2018-09-10 NOTE — PROGRESS NOTES
ASSESSMENT & PLAN: Jluia Berry is a 48 y o  [id number] with normal gynecologic exam     1   Routine well woman exam done today  2   Pap and HPV:Pap with HPV was not done today  S/p hysterectomy  Current ASCCP Guidelines reviewed  3   Mammogram ordered  Recommend yearly mammography  4   Colonoscopy recommended  5  The patient is sexually active  6  The following were reviewed in today's visit: breast self exam, mammography screening ordered, exercise and healthy diet  7  Patient to return to office in 12 months for annual exam    8  Discussed kegel exercises, urogyn consult  All questions have been answered to her satisfaction  CC:  Annual Gynecologic Examination    HPI: Julia Berry is a 48 y o  [id number] who presents for annual gynecologic examination  She has the following concerns:  C/o leakage of urine, usually with a lot of activity  Sometimes feels pressure in the vagina  Mole x 2 she noted on labia  Concern regarding skin on breast - right side  Health Maintenance:    She exercises 6 days per week  She wears her seatbelt routinely  She does not perform regular monthly self breast exams  She feels safe at home  Patients does try to follow a balanced diet  Last mammogram: at age 45  Last colonoscopy: Patient to schedule          Past Medical History:   Diagnosis Date    Carpal tunnel syndrome     Right; last assessed 16    Functional heart murmur in      H/O cardiac disorder     Heart beat abnormality     Heart palpitations     History of paroxysmal supraventricular tachycardia     History of urinary tract infection     Hx of bleeding disorder     abnormal uterine bleeding    Hx of migraines     Irregular heart beat     Lightheadedness     Uterine leiomyoma     resolved; 16    Wears contact lenses     Wears glasses        Past Surgical History:   Procedure Laterality Date    CARPAL TUNNEL RELEASE      FOOT SURGERY      HERNIA REPAIR      pt was 10months old    HYSTERECTOMY      LASER ABLATION N/A     vaginal lesion(s)    MOLE EXCISION      PA ANKLE SCOPE,PLANTAR FASCIOTOMY Right 2017    Procedure: RELEASE FASCIA PLANTAR/FASCIOTOMY ENDOSCOPIC (EPF); Surgeon: Giselle Wagner DPM;  [facility];  Service: Podiatry    PA CYSTOURETHROSCOPY N/A 2016    Procedure: Vinie Dancer;  Surgeon: [facility] DO RHIANNON;  [facility];  Service: Gynecology    PA LAPAROSCOPY W TOT HYSTERECTUTERUS <=250 GRAM  W TUBE/OVARY N/A 2016    Procedure: TOTAL LAPAROSCOPIC HYSTERECTOMY ;  [facility];  [facility];  Service: Gynecology    PA WRIST Maxi Boozer LIG Right 2016    Procedure: ENDOSCOPIC CARPAL TUNNEL RELEASE;  Surgeon: Kevin Merlos MD;  [facility];  Service: Orthopedics    SALPINGECTOMY Bilateral 2016    Procedure: REMOVAL OF BOTH FALLOPIAN TUBES;  [facility];  [facility];  Service:     TUBAL LIGATION         Past OB/Gyn History:   No LMP recorded  Patient has had a hysterectomy  Menstrual History:  OB History      Para Term  AB Living    4 3 3   1 2    SAB TAB Ectopic Multiple Live Births    1       2        Obstetric Comments    Perimenopause           No LMP recorded  Patient has had a hysterectomy  Menstrual history: Patient is s/p hysterectomy  History of sexually transmitted infection No  Patient is currently sexually active: heterosexual  Birth control: postmenopausal  Last Pap   :  no abnormalities      Family History   Problem Relation Age of Onset    Diabetes Mother         mellitus    Uterine cancer Mother     Obesity Mother     COPD Father     Hypothyroidism Sister     Atrial fibrillation Brother     Heart failure Maternal Grandmother     Heart failure Maternal Grandfather     Heart attack Paternal Grandmother     Diabetes Family         mellitus    Cancer Family        Social History:  Social History Social History    Marital status:      Spouse name: N/A    Number of children: N/A    Years of education: N/A     Occupational History    Not on file  Social History Main Topics    Smoking status: Never Smoker    Smokeless tobacco: Never Used    Alcohol use 1 2 oz/week     2 Glasses of wine per week      Comment: occasional alcohol use    Drug use: No    Sexual activity: Yes     Partners: Male     Birth control/ protection: Post-menopausal     Other Topics Concern    Not on file     Social History Narrative    Per allscripts;     Caffeine use    Coffee    Exercise: Walking             Presently lives with boyfriend  Patient is monagamous  Patient is currently employed  Allergies   Allergen Reactions    Levaquin [Levofloxacin] Anaphylaxis and Swelling     L         Current Outpatient Prescriptions:     atorvastatin (LIPITOR) 20 mg tablet, , Disp: , Rfl:     Cranberry-Vit C-Lactobacillus (CRANBERRY/PROBIOTIC/VIT C) 450-30 MG TABS, Take 1 tablet by mouth daily, Disp: , Rfl:     estradiol (ESTRACE) 0 5 MG tablet, Take 1 tablet (0 5 mg total) by mouth daily, Disp: 90 tablet, Rfl: 0    metoprolol succinate (TOPROL-XL) 50 mg 24 hr tablet, Take 1 tablet (50 mg total) by mouth daily, Disp: 90 tablet, Rfl: 3    progesterone (PROMETRIUM) 100 MG capsule, Take 1 capsule (100 mg total) by mouth daily at bedtime, Disp: 90 capsule, Rfl: 0    Review of Systems:  Review of Systems   Constitutional: Negative for fatigue and unexpected weight change  Respiratory: Negative for shortness of breath  Cardiovascular: Negative for chest pain  Gastrointestinal: Negative for abdominal pain, blood in stool, constipation, nausea and vomiting  Genitourinary: Positive for urgency (, maira)  Negative for difficulty urinating, dyspareunia, frequency, pelvic pain, vaginal bleeding and vaginal discharge  Neurological: Negative for headaches  Skin change on right breast  No lumps         Physical Exam:  /64   Ht 5' 3" (1 6 m)   Wt 66 2 kg (146 lb)   BMI 25 86 kg/m²    Physical Exam   Constitutional: She is oriented to person, place, and time  She appears well-developed and well-nourished  Genitourinary: Vagina normal  Pelvic exam was performed with patient supine  There is lesion (tiny hemangioma on labia) on the right labia  There is no rash, tenderness or injury on the right labia  There is lesion (tiny hemangioma on labia) on the left labia  There is no rash, tenderness or injury on the left labia  Vagina exhibits rugosity  Vagina exhibits no lesion (mild cystocele)  No tenderness in the vagina  No signs of injury around the vagina  Right adnexum does not display mass, does not display tenderness and does not display fullness  Left adnexum does not display mass, does not display tenderness and does not display fullness  HENT:   Head: Normocephalic  Neck: No thyromegaly present  Cardiovascular: Normal rate and regular rhythm  Pulmonary/Chest: Effort normal and breath sounds normal  No respiratory distress  She has no wheezes  Right breast exhibits no inverted nipple, no mass, no nipple discharge, no skin change (no skin changes noted  ) and no tenderness  Left breast exhibits no inverted nipple, no mass, no nipple discharge, no skin change and no tenderness  Abdominal: Soft  Bowel sounds are normal  She exhibits no distension  There is no tenderness  There is no guarding  Neurological: She is alert and oriented to person, place, and time  Skin: Skin is warm and dry  She is not diaphoretic  Psychiatric: She has a normal mood and affect  Her behavior is normal      Absent uterus  Cuff intact  No palpable masses

## 2018-09-24 PROCEDURE — 88305 TISSUE EXAM BY PATHOLOGIST: CPT | Performed by: PATHOLOGY

## 2018-09-26 ENCOUNTER — LAB REQUISITION (OUTPATIENT)
Dept: LAB | Facility: HOSPITAL | Age: 53
End: 2018-09-26
Payer: COMMERCIAL

## 2018-09-26 DIAGNOSIS — D48.5 NEOPLASM OF UNCERTAIN BEHAVIOR OF SKIN: ICD-10-CM

## 2018-12-06 DIAGNOSIS — E78.2 MIXED HYPERLIPIDEMIA: Primary | ICD-10-CM

## 2018-12-10 DIAGNOSIS — E78.2 MIXED HYPERLIPIDEMIA: ICD-10-CM

## 2018-12-10 RX ORDER — ATORVASTATIN CALCIUM 20 MG/1
20 TABLET, FILM COATED ORAL DAILY
Qty: 30 TABLET | Refills: 5 | Status: SHIPPED | OUTPATIENT
Start: 2018-12-10 | End: 2018-12-11 | Stop reason: SDUPTHER

## 2018-12-10 RX ORDER — ATORVASTATIN CALCIUM 20 MG/1
20 TABLET, FILM COATED ORAL DAILY
Qty: 90 TABLET | Refills: 3 | Status: CANCELLED | OUTPATIENT
Start: 2018-12-10

## 2018-12-11 DIAGNOSIS — E78.2 MIXED HYPERLIPIDEMIA: ICD-10-CM

## 2018-12-11 RX ORDER — ATORVASTATIN CALCIUM 20 MG/1
20 TABLET, FILM COATED ORAL DAILY
Qty: 90 TABLET | Refills: 3 | Status: SHIPPED | OUTPATIENT
Start: 2018-12-11 | End: 2019-12-18 | Stop reason: SDUPTHER

## 2018-12-26 ENCOUNTER — EVALUATION (OUTPATIENT)
Dept: PHYSICAL THERAPY | Facility: MEDICAL CENTER | Age: 53
End: 2018-12-26
Payer: COMMERCIAL

## 2018-12-26 ENCOUNTER — TRANSCRIBE ORDERS (OUTPATIENT)
Dept: PHYSICAL THERAPY | Facility: MEDICAL CENTER | Age: 53
End: 2018-12-26

## 2018-12-26 DIAGNOSIS — M72.2 PLANTAR FASCIAL FIBROMATOSIS: Primary | ICD-10-CM

## 2018-12-26 DIAGNOSIS — M72.2 PLANTAR FASCIITIS, LEFT: Primary | ICD-10-CM

## 2018-12-26 PROCEDURE — G8990 OTHER PT/OT CURRENT STATUS: HCPCS | Performed by: PHYSICAL THERAPIST

## 2018-12-26 PROCEDURE — G8991 OTHER PT/OT GOAL STATUS: HCPCS | Performed by: PHYSICAL THERAPIST

## 2018-12-26 PROCEDURE — 97161 PT EVAL LOW COMPLEX 20 MIN: CPT | Performed by: PHYSICAL THERAPIST

## 2018-12-26 PROCEDURE — 97140 MANUAL THERAPY 1/> REGIONS: CPT | Performed by: PHYSICAL THERAPIST

## 2018-12-26 NOTE — PROGRESS NOTES
PT Evaluation     Today's date: 2018  Patient name: Jennyfer Santamaria  : 1965  MRN: [id number]  Referring provider: Alexy Reyes DPM  Dx:   Encounter Diagnosis     ICD-10-CM    1  Plantar fasciitis, left M72 2                   Assessment  Assessment details: Patient is a 49 y/o female who presents with complaints of left heel pain consistent plantar fasciitis diagnosis  No further referral appears necessary at this time based upon examination results  Patient presents with the following impairments: decreased range of motion, decreased strength and decreased ability to perform functional tasks such as walking  Prognosis is good given HEP compliance and PT 2x/wk tapering to 1x/wk over the next 4-6 weeks  Positive prognostic indicators include positive attitude toward recovery  Please contact me if you have any questions or recommendations  Thank you for the opportunity to share in Liliana's care  Impairments: abnormal or restricted ROM, activity intolerance, impaired physical strength, lacks appropriate home exercise program, pain with function and weight-bearing intolerance  Understanding of Dx/Px/POC: excellent   Prognosis: good    Goals  STG  Decrease pain by 50% in 4 weeks  Increase range of motion by 10 degrees in 4 weeks  Increase strength by half a grade in 4 weeks  LTG  Patient will be independent in hep in 4 weeks  Patient will be able to perform adls at plof by D/C  Patient will be able to perform ambulation at plof by D/C      Plan  Plan details: 1x/week after new year due to co-pay  Patient would benefit from: skilled physical therapy  Referral necessary: No  Planned modality interventions: cryotherapy  Planned therapy interventions: home exercise program, functional ROM exercises, flexibility, manual therapy, massage, neuromuscular re-education, patient education, strengthening, stretching and therapeutic exercise  Frequency: 2x week  Duration in weeks: 6  Plan of Care beginning date: 2018  Plan of Care expiration date: 2019  Treatment plan discussed with: patient        Subjective Evaluation    History of Present Illness  Mechanism of injury: Patient reports pain has been going on in left foot for about 6 months  Patient notes that most of the pain is in the heel  Patient had injection at the end of November which did not help  Notes it helped her right foot in the past but ended up getting surgery on that foot anyway  Was taught some stretches by podiatrist     Worse first thing in the morning and upon standing from chair     Quality of life: excellent    Pain  Current pain ratin  At best pain rating: 3  At worst pain rating: 10  Quality: burning, sharp and tight  Relieving factors: rest  Aggravating factors: walking and standing  Progression: worsening      Diagnostic Tests  X-ray: abnormal (bone spur)  Treatments  Previous treatment: injection treatment  Patient Goals  Patient goals for therapy: decreased pain, increased motion, increased strength, independence with ADLs/IADLs and return to sport/leisure activities          Objective     Active Range of Motion   Left Ankle/Foot   Dorsiflexion (ke): 10 degrees   Plantar flexion: 50 degrees   Inversion: 35 degrees   Eversion: 10 degrees     Right Ankle/Foot   Dorsiflexion (ke): 10 degrees   Plantar flexion: 50 degrees   Inversion: 35 degrees   Eversion: 10 degrees     Passive Range of Motion   Left Ankle/Foot    Eversion: 15 degrees     Right Ankle/Foot    Eversion: 15 degrees     Strength/Myotome Testing     Left Hip   Planes of Motion   Flexion: 4  Extension: 4+  Abduction: 4  External rotation: 4-    Right Hip   Planes of Motion   Flexion: 4+  Extension: 4+  Abduction: 4+  External rotation: 4    Left Knee   Normal strength    Right Knee   Normal strength    Left Ankle/Foot   Dorsiflexion: 5  Inversion: 4  Eversion: 4    Right Ankle/Foot   Dorsiflexion: 5  Inversion: 5  Eversion: 4    Tests     Additional Tests Details  Pes Cavus bilaterally        Flowsheet Rows      Most Recent Value   PT/OT G-Codes   Current Score  41   Projected Score  65   FOTO information reviewed  Yes   Assessment Type  Evaluation   G code set  Other PT/OT Primary   Other PT Primary Current Status ()  CK   Other PT Primary Goal Status ()  CJ          Precautions tachycardia    Specialty Daily Treatment Diary     Manual  12/26       IAS 10'                                           Exercise Diary  12/26       bike        SLRx3        clamshells        4-way ankle        Toe curls        BAPS        rockerboard        gastroc str        Soleus str                                                                                                    Modalities        CP prn

## 2018-12-26 NOTE — LETTER
2018    Kelton Katz, [address] [address]    Patient: Jose Amin   YOB: 1965   Date of Visit: 2018     Encounter Diagnosis     ICD-10-CM    1  Plantar fasciitis, left M72 2        Dear Dr Bello Robin:    Please review the attached Plan of Care from Joao Goldstein recent visit  Please verify that you agree therapy should continue by signing the attached document and sending it back to our office  If you have any questions or concerns, please don't hesitate to call  Sincerely,    Pennie Diaz, PT      Referring Provider:      I certify that I have read the below Plan of Care and certify the need for these services furnished under this plan of treatment while under my care  Kelton Katz DPM  [place] [address]  VIA Facsimile: [phone]          PT Evaluation     Today's date: 2018  Patient name: Jose Amin  : 1965  MRN: [id number]  Referring provider: Gigi Ness DPM  Dx:   Encounter Diagnosis     ICD-10-CM    1  Plantar fasciitis, left M72 2                   Assessment  Assessment details: Patient is a 47 y/o female who presents with complaints of left heel pain consistent plantar fasciitis diagnosis  No further referral appears necessary at this time based upon examination results  Patient presents with the following impairments: decreased range of motion, decreased strength and decreased ability to perform functional tasks such as walking  Prognosis is good given HEP compliance and PT 2x/wk tapering to 1x/wk over the next 4-6 weeks  Positive prognostic indicators include positive attitude toward recovery  Please contact me if you have any questions or recommendations  Thank you for the opportunity to share in Liliana's care    Impairments: abnormal or restricted ROM, activity intolerance, impaired physical strength, lacks appropriate home exercise program, pain with function and weight-bearing intolerance  Understanding of Dx/Px/POC: excellent   Prognosis: good    Goals  STG  Decrease pain by 50% in 4 weeks  Increase range of motion by 10 degrees in 4 weeks  Increase strength by half a grade in 4 weeks  LTG  Patient will be independent in hep in 4 weeks  Patient will be able to perform adls at plof by D/C  Patient will be able to perform ambulation at plof by D/C  Plan  Plan details: 1x/week after new year due to co-pay  Patient would benefit from: skilled physical therapy  Referral necessary: No  Planned modality interventions: cryotherapy  Planned therapy interventions: home exercise program, functional ROM exercises, flexibility, manual therapy, massage, neuromuscular re-education, patient education, strengthening, stretching and therapeutic exercise  Frequency: 2x week  Duration in weeks: 6  Plan of Care beginning date: 2018  Plan of Care expiration date: 2019  Treatment plan discussed with: patient        Subjective Evaluation    History of Present Illness  Mechanism of injury: Patient reports pain has been going on in left foot for about 6 months  Patient notes that most of the pain is in the heel  Patient had injection at the end of November which did not help  Notes it helped her right foot in the past but ended up getting surgery on that foot anyway  Was taught some stretches by podiatrist     Worse first thing in the morning and upon standing from chair     Quality of life: excellent    Pain  Current pain ratin  At best pain rating: 3  At worst pain rating: 10  Quality: burning, sharp and tight  Relieving factors: rest  Aggravating factors: walking and standing  Progression: worsening      Diagnostic Tests  X-ray: abnormal (bone spur)  Treatments  Previous treatment: injection treatment  Patient Goals  Patient goals for therapy: decreased pain, increased motion, increased strength, independence with ADLs/IADLs and return to sport/leisure activities          Objective Active Range of Motion   Left Ankle/Foot   Dorsiflexion (ke): 10 degrees   Plantar flexion: 50 degrees   Inversion: 35 degrees   Eversion: 10 degrees     Right Ankle/Foot   Dorsiflexion (ke): 10 degrees   Plantar flexion: 50 degrees   Inversion: 35 degrees   Eversion: 10 degrees     Passive Range of Motion   Left Ankle/Foot    Eversion: 15 degrees     Right Ankle/Foot    Eversion: 15 degrees     Strength/Myotome Testing     Left Hip   Planes of Motion   Flexion: 4  Extension: 4+  Abduction: 4  External rotation: 4-    Right Hip   Planes of Motion   Flexion: 4+  Extension: 4+  Abduction: 4+  External rotation: 4    Left Knee   Normal strength    Right Knee   Normal strength    Left Ankle/Foot   Dorsiflexion: 5  Inversion: 4  Eversion: 4    Right Ankle/Foot   Dorsiflexion: 5  Inversion: 5  Eversion: 4    Tests     Additional Tests Details  Pes Cavus bilaterally        Flowsheet Rows      Most Recent Value   PT/OT G-Codes   Current Score  41   Projected Score  65   FOTO information reviewed  Yes   Assessment Type  Evaluation   G code set  Other PT/OT Primary   Other PT Primary Current Status ()  CK   Other PT Primary Goal Status ()  CJ          Precautions tachycardia    Specialty Daily Treatment Diary     Manual  12/26       IAS 10'                                           Exercise Diary  12/26       bike        SLRx3        clamshells        4-way ankle        Toe curls        BAPS        rockerboard        gastroc str        Soleus str                                                                                                    Modalities        CP prn

## 2018-12-27 ENCOUNTER — OFFICE VISIT (OUTPATIENT)
Dept: PHYSICAL THERAPY | Facility: MEDICAL CENTER | Age: 53
End: 2018-12-27
Payer: COMMERCIAL

## 2018-12-27 DIAGNOSIS — M72.2 PLANTAR FASCIITIS, LEFT: Primary | ICD-10-CM

## 2018-12-27 PROCEDURE — 97112 NEUROMUSCULAR REEDUCATION: CPT | Performed by: PHYSICAL THERAPIST

## 2018-12-27 PROCEDURE — 97110 THERAPEUTIC EXERCISES: CPT | Performed by: PHYSICAL THERAPIST

## 2018-12-27 PROCEDURE — 97140 MANUAL THERAPY 1/> REGIONS: CPT | Performed by: PHYSICAL THERAPIST

## 2018-12-27 NOTE — PROGRESS NOTES
Daily Note     Today's date: 2018  Patient name: Tay Aguillon  : 1965  MRN: [id number]  Referring provider: Saleem Mckinney DPM  Dx:   Encounter Diagnosis     ICD-10-CM    1  Plantar fasciitis, left M72 2                   Subjective: Patient reports feeling about the same  Objective: See treatment diary below    Precautions tachycardia     Specialty Daily Treatment Diary      Manual           IASTM 10'  10'                                                                       Exercise Diary           bike    7'         SLRx3    20x ea         clamshells    5" 20x         4-way ankle    20x ea GTB         Toe curls    2 min         BAPS    15x ea         rockerboard    30x         gastroc str    30" 3x         Soleus str    30" 3x          HR/TR    20x ea                                                                                                                                                           Modalities             CP prn                                                Assessment: Tolerated treatment fair  Patient demonstrated fatigue post treatment and would benefit from continued PT  Patient walking with less of a limp after exercises  Plan: Continue per plan of care  Progress treatment as tolerated

## 2018-12-31 ENCOUNTER — OFFICE VISIT (OUTPATIENT)
Dept: PHYSICAL THERAPY | Facility: MEDICAL CENTER | Age: 53
End: 2018-12-31
Payer: COMMERCIAL

## 2018-12-31 DIAGNOSIS — M72.2 PLANTAR FASCIITIS, LEFT: Primary | ICD-10-CM

## 2018-12-31 PROCEDURE — 97140 MANUAL THERAPY 1/> REGIONS: CPT

## 2018-12-31 PROCEDURE — 97110 THERAPEUTIC EXERCISES: CPT

## 2018-12-31 PROCEDURE — 97112 NEUROMUSCULAR REEDUCATION: CPT

## 2018-12-31 NOTE — PROGRESS NOTES
Daily Note     Today's date: 2018  Patient name: Krystal Wilkins  : 1965  MRN: [id number]  Referring provider: Hu Irby DPM  Dx:   Encounter Diagnosis     ICD-10-CM    1  Plantar fasciitis, left M72 2      1:1 with PTA CR 1:30- 2:10  CP 2:10-2:20              Subjective: Severe pain after being on her feet all day yesterday  Pain 8/10  Still waiting to receive orthotics  Objective: See treatment diary below    Precautions tachycardia     Specialty Daily Treatment Diary      Manual         IASTM 10'  10'  10 mins                           Exercise Diary         bike    7'  8 mins       SLRx3    20x ea  20 ea        clamshells    5" 20x  5"x20        4-way ankle    20x ea GTB  GTB  20 ea        Toe curls    2 min  2 mins       BAPS    15x ea  15 ea        rockerboard    30x  30 ea        gastroc str    30" 3x  30"x3       Soleus str    30" 3x  30"x3        HR/TR    20x ea  20 ea                                                                                                                                                           Modalities             CP post      10 post                            Assessment: Tolerated treatment fair  Patient demonstrated fatigue post treatment and would benefit from continued PT  Most TTP along medial calcaneous  Concluded with CP; assess NV  Plan: Continue per plan of care  Progress treatment as tolerated

## 2019-01-03 ENCOUNTER — OFFICE VISIT (OUTPATIENT)
Dept: PHYSICAL THERAPY | Facility: MEDICAL CENTER | Age: 54
End: 2019-01-03
Payer: COMMERCIAL

## 2019-01-03 DIAGNOSIS — M72.2 PLANTAR FASCIITIS, LEFT: Primary | ICD-10-CM

## 2019-01-03 PROCEDURE — G8991 OTHER PT/OT GOAL STATUS: HCPCS | Performed by: PHYSICAL THERAPIST

## 2019-01-03 PROCEDURE — 97112 NEUROMUSCULAR REEDUCATION: CPT | Performed by: PHYSICAL THERAPIST

## 2019-01-03 PROCEDURE — 97140 MANUAL THERAPY 1/> REGIONS: CPT | Performed by: PHYSICAL THERAPIST

## 2019-01-03 PROCEDURE — G8992 OTHER PT/OT  D/C STATUS: HCPCS | Performed by: PHYSICAL THERAPIST

## 2019-01-03 PROCEDURE — 97110 THERAPEUTIC EXERCISES: CPT | Performed by: PHYSICAL THERAPIST

## 2019-01-03 NOTE — PROGRESS NOTES
Daily Note     Today's date: 1/3/2019  Patient name: Hal Adler  : 1965  MRN: [id number]  Referring provider: Mira Velasco DPM  Dx:   Encounter Diagnosis     ICD-10-CM    1  Plantar fasciitis, left M72 2               Returned to MD and has different diagnosis  Subjective: Patient reports sharp pains going up posterior ankle especially when coming down the stairs  Objective: See treatment diary below    Precautions tachycardia     Specialty Daily Treatment Diary      Manual  12/26  12/27  12/31  1/3     IASTM PF and achilles 10'  10'  10 mins  10'      ankle DF mobs       AK           Exercise Diary  12/26  12/27  12/31  1/3     bike    7'  8 mins  8'     SLRx3    20x ea  20 ea   20x ea     clamshells    5" 20x  5"x20   5" 20x     4-way ankle    20x ea GTB  GTB  20 ea   HEP     Toe curls    2 min  2 mins  2 min     BAPS    15x ea  15 ea   15x ea     rockerboard    30x  30 ea   30x ea     gastroc str    30" 3x  30"x3  30" 3x     Soleus str    30" 3x  30"x3  30" 3x      HR/TR    20x ea  20 ea    20x ea                                                                                                                                                       Modalities      12/31  1/3     CP post      10 post  10'                          Assessment: Tolerated treatment well  Patient demonstrated fatigue post treatment and would benefit from continued PT  Will determine if ankle mobs and IASTM to achilles helps with sharp pains  Plan: Continue per plan of care  Progress treatment as tolerated

## 2019-01-10 ENCOUNTER — APPOINTMENT (OUTPATIENT)
Dept: PHYSICAL THERAPY | Facility: MEDICAL CENTER | Age: 54
End: 2019-01-10
Payer: COMMERCIAL

## 2019-01-14 ENCOUNTER — APPOINTMENT (OUTPATIENT)
Dept: PHYSICAL THERAPY | Facility: MEDICAL CENTER | Age: 54
End: 2019-01-14
Payer: COMMERCIAL

## 2019-01-21 ENCOUNTER — APPOINTMENT (OUTPATIENT)
Dept: PHYSICAL THERAPY | Facility: MEDICAL CENTER | Age: 54
End: 2019-01-21
Payer: COMMERCIAL

## 2019-01-23 ENCOUNTER — TRANSCRIBE ORDERS (OUTPATIENT)
Dept: ADMINISTRATIVE | Facility: HOSPITAL | Age: 54
End: 2019-01-23

## 2019-01-23 DIAGNOSIS — G57.52 TARSAL TUNNEL SYNDROME OF LEFT SIDE: Primary | ICD-10-CM

## 2019-01-30 ENCOUNTER — HOSPITAL ENCOUNTER (OUTPATIENT)
Dept: MRI IMAGING | Facility: HOSPITAL | Age: 54
Discharge: HOME | End: 2019-01-30
Payer: COMMERCIAL

## 2019-01-30 DIAGNOSIS — G57.52 TARSAL TUNNEL SYNDROME OF LEFT SIDE: ICD-10-CM

## 2019-01-30 PROCEDURE — 73718 MRI LOWER EXTREMITY W/O DYE: CPT

## 2019-01-30 PROCEDURE — 73721 MRI JNT OF LWR EXTRE W/O DYE: CPT

## 2019-02-18 DIAGNOSIS — I47.1 PSVT (PAROXYSMAL SUPRAVENTRICULAR TACHYCARDIA) (HCC): ICD-10-CM

## 2019-02-18 RX ORDER — METOPROLOL SUCCINATE 50 MG/1
50 TABLET, EXTENDED RELEASE ORAL DAILY
Qty: 90 TABLET | Refills: 3 | Status: SHIPPED | OUTPATIENT
Start: 2019-02-18 | End: 2020-02-17 | Stop reason: SDUPTHER

## 2019-03-06 ENCOUNTER — HOSPITAL ENCOUNTER (OUTPATIENT)
Dept: NEUROLOGY | Facility: CLINIC | Age: 54
Discharge: HOME | End: 2019-03-06
Payer: COMMERCIAL

## 2019-03-06 DIAGNOSIS — G57.52 TARSAL TUNNEL SYNDROME OF LEFT SIDE: ICD-10-CM

## 2019-03-06 PROCEDURE — 95912 NRV CNDJ TEST 11-12 STUDIES: CPT | Performed by: PHYSICAL MEDICINE & REHABILITATION

## 2019-03-06 PROCEDURE — 95886 MUSC TEST DONE W/N TEST COMP: CPT | Performed by: PHYSICAL MEDICINE & REHABILITATION

## 2019-03-06 NOTE — PROCEDURES
[address] [facility] [address] [facility] [address]  [address]   [phone]        Name: Horace Day  Patient ID: [id number]   Age: 48 Years 5 Months  YOB: 1965   Account Number:    Gender: Female   Technologist:    Date of Exam: 3/6/2019 11:52   Referring Physician: Jae Farr DPM  Temperature: 32 6   Examining Physician: Jorje Stacy MD  Height:                 Patient History:   48 y o female with 6 months history of foot pain, primarily affecting left foot  She has history of bilateral plantar fasciitis  She is referred for tarsal tunnel evaluation  5/5 bilateral EHL, dorsi/plantarflexion, knee extension, hip flexion  negative babinski bilateral         MNC      Nerve / Sites Muscle Latency Ref  Amplitude Ref  Duration Rel Amp Distance Lat Diff Velocity Ref  ms ms mV mV ms % mm ms m/s m/s   R Peroneal - EDB      Ankle EDB 4 22 ?5 50 7 8 ?2 0 4 95 100 80         Fib head EDB 9 17  6 8  5 26 87 250 4 95 51 ?40      Pop fossa EDB 10 63  6 9  5 42 101 80 1 46 55 ?40            6 41     L Peroneal - EDB      Ankle EDB 4 38 ?5 50 3 9 ?2 0 7 50 100 80         Fib head EDB 9 27  3 8  8 70 97 9 255 4 90 52 ?40      Pop fossa EDB 10 99  3 6  8 85 93 3 90 1 72 52 ?40            6 61     R Tibial - AH      Ankle AH 4 38 ?6 00 10 0 ?4 0 4 64 100 100         Pop fossa AH 11 25  10 9  4 32 109 280 6 88 41 ?40   L Tibial - AH      Ankle AH 4 90 ?6 00 5 8 ?4 0 3 91 100 100         Pop fossa AH 10 63  7 5  5 36 130 300 5 73 52 ?40       SNC      Nerve / Sites Rec  Site Onset Lat Peak Lat Ref  Amp Ref  Distance Peak Diff Velocity Ref       ms ms ms µV µV mm ms m/s m/s   R Sural - Ankle (Calf)      Calf Ankle 2 50 3 28 ?4 00 6 4 ?6 0 140  56 ?40   L Sural - Ankle (Calf)      Calf Ankle 2 86 3 49 ?4 00 7 6 ?6 0 140  49 ?40   R Superficial peroneal - Ankle      Lat leg Ankle 2 40 3 13 ?3 50 10 9 ?6 0 100  42 ?40   L Superficial peroneal - Ankle      Lat leg Ankle 2 14 2 71 ?3 50 9 2 ?6 0 100 47 ?40   L Medial plantar, Lateral plantar - Ankle (Medial, lateral sole)      Medial plantar Sole Ankle 2 19 2 81 ?3 70 3 4 ?2 0 140  64 ?45      Lateral plantar Sole Ankle 2 45 3 28 ?3 70 3 8 ?2 0 140  57 ? 45           -0 47     R Medial plantar, Lateral plantar - Ankle (Medial, lateral sole)      Medial plantar Sole Ankle 2 19 3 07 ?3 70 4 3 ?2 0 140  64 ?45      Lateral plantar Sole Ankle 2 60 3 33 ?3 70 6 7 ?2 0 140  54 ? 45           -0 26         H Reflex      Nerve H Lat    ms    Left Right Ref  Tibial - Soleus 26 0  ?34 0       EMG         Needle EMG Examination     Insertional Spontaneous MUAP   Muscle Nerve Roots Activity Fib PSW Fasc Dur  Amp Poly Config Recruitment   L  Vastus medialis Femoral L2-L4 Normal None None None Normal Normal None Normal Normal   L  Tibialis anterior Peroneal L4-L5 Normal None None None Normal Normal None Normal Normal   L  Gastrocnemius (Medial head) Tibial S1-S2 Normal None None None Normal Normal None Normal Normal   L  Peroneus longus Perineal L5-S1 Normal None None None Normal Normal None Normal Normal   L  Gluteus medius Superior gluteal L4-S1 Normal None None None Normal Normal None Normal Normal   L  Abductor hallucis Tibial S1-S2 Normal None None None Normal Normal None Normal Normal   L  Dorsal interossei (pedis) Medial plantar S1-S2 Normal None None None Normal Normal None Normal Normal   L  Abductor digiti minimi (pedis) Tibial S1-S2 Normal None None None Normal Normal None Normal Normal         Findings:   Motor:  Right peroneal compound motor action potential (CMAP) to the extensor digitorum brevis demonstrated normal distal latency, normal amplitude, and normal conduction velocity across the fibular head and lower leg  Right tibial compound motor action potential (CMAP) to the abductor hallucis demonstrated normal distal latency, normal amplitude, and normal conduction velocity across the lower leg       Left peroneal compound motor action potential (CMAP) to the extensor digitorum brevis demonstrated normal distal latency, normal amplitude, and normal conduction velocity across the fibular head and lower leg  Left tibial compound motor action potential (CMAP) to the abductor hallucis demonstrated normal distal latency, normal amplitude, and normal conduction velocity across the lower leg  Sensory:  Right sural sensory nerve action potential (SNAP) demonstrated normal peak latency and normal amplitude  Right superficial peroneal sensory nerve action potential (SNAP) demonstrated normal peak latency and normal amplitude  Left sural sensory nerve action potential (SNAP) demonstrated normal peak latency and normal amplitude  Left superficial peroneal sensory nerve action potential (SNAP) demonstrated normal peak latency and normal amplitude  Left and right medial and lateral plantar SNAPs demonstrated normal amplitudes and normal peak latencies  Left tibial H-wave demonstrated normal latency  EMG:  A disposable monopolar needle was used to study selected muscles in the left lower extremity including the tibialis anterior, medial gastrocnemius, peroneus longus, vastus medialis, and gluteus medius  The left abductor digiti quinti, first dorsal interosseous pedis, and abductor hallucis were also tested  All muscles tested demonstrated normal insertional activity, no abnormal spontaneous activity, and normal volitional motor unit action potentials  Needle exam of the right lower extremity was deferred due to lack of radicular symptoms  Impression: Normal study  1  There is no electrodiagnostic evidence of a left or right tibial entrapment mononeuropathy across the ankle suggestive of tarsal tunnel syndrome  Bilateral medial and lateral plantar sensory studies were unremarkable  There is no evidence of a left inferior calcaneal nerve lesion (Granados neuropathy), as demonstrated by unremarkable needle exam of the abductor digiti quinti  2  There is no electrodiagnostic evidence of a left or right peroneal or sural mononeuropathy  3  There is no electrodiagnostic evidence of a left lumbosacral plexopathy or lumbar radiculopathy  4  There is no electrodiagnostic evidence of a large fiber peripheral polyneuropathy             ___________________________  [facility]

## 2019-03-22 ENCOUNTER — TRANSCRIBE ORDERS (OUTPATIENT)
Dept: LAB | Facility: CLINIC | Age: 54
End: 2019-03-22

## 2019-03-22 ENCOUNTER — APPOINTMENT (OUTPATIENT)
Dept: LAB | Facility: CLINIC | Age: 54
End: 2019-03-22
Payer: COMMERCIAL

## 2019-03-22 DIAGNOSIS — Z01.812 PRE-OPERATIVE LABORATORY EXAMINATION: Primary | ICD-10-CM

## 2019-03-22 DIAGNOSIS — Z01.812 PRE-OPERATIVE LABORATORY EXAMINATION: ICD-10-CM

## 2019-03-22 LAB
ANION GAP SERPL CALCULATED.3IONS-SCNC: 7 MMOL/L (ref 4–13)
BUN SERPL-MCNC: 18 MG/DL (ref 5–25)
CALCIUM SERPL-MCNC: 9.5 MG/DL (ref 8.3–10.1)
CHLORIDE SERPL-SCNC: 105 MMOL/L (ref 100–108)
CO2 SERPL-SCNC: 23 MMOL/L (ref 21–32)
CREAT SERPL-MCNC: 1.04 MG/DL (ref 0.6–1.3)
ERYTHROCYTE [DISTWIDTH] IN BLOOD BY AUTOMATED COUNT: 11.9 % (ref 11.6–15.1)
GFR SERPL CREATININE-BSD FRML MDRD: 62 ML/MIN/1.73SQ M
GLUCOSE SERPL-MCNC: 97 MG/DL (ref 65–140)
HCT VFR BLD AUTO: 45 % (ref 34.8–46.1)
HGB BLD-MCNC: 14.7 G/DL (ref 11.5–15.4)
INR PPP: 0.93 (ref 0.86–1.17)
MCH RBC QN AUTO: 31.6 PG (ref 26.8–34.3)
MCHC RBC AUTO-ENTMCNC: 32.7 G/DL (ref 31.4–37.4)
MCV RBC AUTO: 97 FL (ref 82–98)
PLATELET # BLD AUTO: 309 THOUSANDS/UL (ref 149–390)
PMV BLD AUTO: 11 FL (ref 8.9–12.7)
POTASSIUM SERPL-SCNC: 4.1 MMOL/L (ref 3.5–5.3)
PROTHROMBIN TIME: 12.6 SECONDS (ref 11.8–14.2)
RBC # BLD AUTO: 4.65 MILLION/UL (ref 3.81–5.12)
SODIUM SERPL-SCNC: 135 MMOL/L (ref 136–145)
WBC # BLD AUTO: 8.49 THOUSAND/UL (ref 4.31–10.16)

## 2019-03-22 PROCEDURE — 36415 COLL VENOUS BLD VENIPUNCTURE: CPT

## 2019-03-22 PROCEDURE — 80048 BASIC METABOLIC PNL TOTAL CA: CPT

## 2019-03-22 PROCEDURE — 85027 COMPLETE CBC AUTOMATED: CPT

## 2019-03-22 PROCEDURE — 85610 PROTHROMBIN TIME: CPT

## 2019-03-26 ENCOUNTER — TELEPHONE (OUTPATIENT)
Dept: CARDIOLOGY CLINIC | Facility: CLINIC | Age: 54
End: 2019-03-26

## 2019-03-26 ENCOUNTER — OFFICE VISIT (OUTPATIENT)
Dept: CARDIOLOGY CLINIC | Facility: CLINIC | Age: 54
End: 2019-03-26
Payer: COMMERCIAL

## 2019-03-26 VITALS
OXYGEN SATURATION: 99 % | DIASTOLIC BLOOD PRESSURE: 76 MMHG | WEIGHT: 156 LBS | BODY MASS INDEX: 27.64 KG/M2 | HEART RATE: 78 BPM | SYSTOLIC BLOOD PRESSURE: 124 MMHG | HEIGHT: 63 IN

## 2019-03-26 DIAGNOSIS — I47.1 PSVT (PAROXYSMAL SUPRAVENTRICULAR TACHYCARDIA) (HCC): Primary | ICD-10-CM

## 2019-03-26 DIAGNOSIS — R00.2 INTERMITTENT PALPITATIONS: ICD-10-CM

## 2019-03-26 DIAGNOSIS — M72.2 PLANTAR FASCIITIS OF LEFT FOOT: ICD-10-CM

## 2019-03-26 DIAGNOSIS — E66.3 OVERWEIGHT (BMI 25.0-29.9): ICD-10-CM

## 2019-03-26 DIAGNOSIS — Z01.810 PREOPERATIVE CARDIOVASCULAR EXAMINATION: ICD-10-CM

## 2019-03-26 DIAGNOSIS — E78.2 MIXED HYPERLIPIDEMIA: ICD-10-CM

## 2019-03-26 PROCEDURE — 93000 ELECTROCARDIOGRAM COMPLETE: CPT | Performed by: INTERNAL MEDICINE

## 2019-03-26 PROCEDURE — 99214 OFFICE O/P EST MOD 30 MIN: CPT | Performed by: INTERNAL MEDICINE

## 2019-03-26 NOTE — PROGRESS NOTES
Cardiology Progress Note    ASSESSMENT:    1 Much improved palpitations overall, with last flare up 8 months ago, and history of AVNRT/PSVT diagnosed in 2005  2  Controlled dyslipidemia with prior 1 1% 10 year and 38 8% lifetime ASCVD risks  3  History of chest pain with normal nuclear stress test on 9/29/16   4  30 8 pound weight gain since January, 2015 and  11 pound weight gain in approximately  7 months  5   Moderate overweight  6  White coat hypertension of recent onset with no evidence of hypertension today  7   Resolved cotton wool exudates on recent eye examination and bilateral iritis, recently treated with steroid eyedrops  Plan       Patient Instructions     1  No cardiac contraindication to left plantar fasciitis surgery on 04/08/2019   2  Weight loss was encouraged  3  Continue current medication  4  Cardiology follow-up in 6 months with EKG, lipids, CMP  HPI    This 48 y o  female  denies new cardiopulmonary and medical symptoms, except for problems with left plantar fasciitis for a number of months, which has resulted in markedly reduced physical activity  The patient will be having left plantar fasciotomy by Dr Gabriella Lora DPM on 04/08/2019 at [facility] in [address] [place] [address]  The patient recently had a bout of iritis, treated successfully with steroid eyedrops  Her previously noted cotton wool spots on her eye examination had resolved  The patient has had no severe palpitations recently and no recent chest pain  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, 2019  Her divorce proceedings are nearly complete  She has 3 children, 2 of whom are living with their father  She has 1and 9year-old grandchildren          Review of Systems    All other systems negative, except as noted in history of present illness    Historical Information   Past Medical History:   Diagnosis Date    Carpal tunnel syndrome     Right; last assessed 16    Functional heart murmur in      H/O cardiac disorder     Heart beat abnormality     Heart palpitations     History of paroxysmal supraventricular tachycardia     History of urinary tract infection     Hx of bleeding disorder     abnormal uterine bleeding    Hx of migraines     Irregular heart beat     Lightheadedness     Uterine leiomyoma     resolved; 16    Wears contact lenses     Wears glasses      Past Surgical History:   Procedure Laterality Date    CARPAL TUNNEL RELEASE      FOOT SURGERY      HERNIA REPAIR      pt was 5months old    HYSTERECTOMY      LASER ABLATION N/A     vaginal lesion(s)    MOLE EXCISION      LA ANKLE SCOPE,PLANTAR FASCIOTOMY Right 2017    Procedure: RELEASE FASCIA PLANTAR/FASCIOTOMY ENDOSCOPIC (EPF);   Surgeon: Pearl Iraheta DPM;  Location: [facility];  Service: Podiatry    LA CYSTOURETHROSCOPY N/A 2016    Procedure: Ciarra Myers;  Surgeon: Jacobo Goldberg, DO;  [facility];  Service: Gynecology    LA LAPAROSCOPY W TOT HYSTERECTUTERUS <=250 GRAM  W TUBE/OVARY N/A 2016    Procedure: TOTAL LAPAROSCOPIC HYSTERECTOMY ;  Surgeon: Jacobo Goldberg, DO;  [facility];  Service: Gynecology    LA WRIST Lele Soles LIG Right 2016    Procedure: ENDOSCOPIC CARPAL TUNNEL RELEASE;  Surgeon: Gold Echevarria MD;  [facility];  Service: Orthopedics    SALPINGECTOMY Bilateral 2016    Procedure: REMOVAL OF BOTH FALLOPIAN TUBES;  Surgeon: Jacobo Goldberg, DO;  [facility];  Service:     TUBAL LIGATION       Social History     Substance and Sexual Activity   Alcohol Use Yes    Alcohol/week: 1 2 oz    Types: 2 Glasses of wine per week    Comment: occasional alcohol use     Social History     Substance and Sexual Activity   Drug Use No     Social History     Tobacco Use   Smoking Status Never Smoker   Smokeless Tobacco Never Used       Family History:  Family History   Problem Relation Age of Onset    Diabetes Mother         mellitus    Uterine cancer Mother     Obesity Mother     COPD Father     Hypothyroidism Sister     Atrial fibrillation Brother     Heart failure Maternal Grandmother     Heart failure Maternal Grandfather     Heart attack Paternal Grandmother     Diabetes Family         mellitus    Cancer Family          Meds/Allergies     Prior to Admission medications    Medication Sig Start Date End Date Taking? Authorizing Provider   atorvastatin (LIPITOR) 20 mg tablet Take 1 tablet (20 mg total) by mouth daily 12/11/18  Yes Chris Izaguirre MD   Cranberry-Vit C-Lactobacillus (CRANBERRY/PROBIOTIC/VIT C) 450-30 MG TABS Take 1 tablet by mouth daily   Yes Historical Provider, MD   estradiol (ESTRACE) 0 5 MG tablet Take 1 tablet (0 5 mg total) by mouth daily 7/2/18  Yes Darcy Álvarez DO   metoprolol succinate (TOPROL-XL) 50 mg 24 hr tablet Take 1 tablet (50 mg total) by mouth daily 2/18/19  Yes Chris Izaguirre MD   patient supplied medication    Yes Historical Provider, MD   progesterone (PROMETRIUM) 100 MG capsule Take 1 capsule (100 mg total) by mouth daily at bedtime 7/2/18  Yes Darcy Álvarez DO       Allergies   Allergen Reactions    Levaquin [Levofloxacin] Anaphylaxis and Swelling     L         Vitals:    03/26/19 0920   BP: 124/76   BP Location: Left arm   Patient Position: Sitting   Cuff Size: Standard   Pulse: 78   SpO2: 99%   Weight: 70 8 kg (156 lb)   Height: 5' 2 5" (1 588 m)       Body mass index is 28 08 kg/m²  11 pound weight gain in approximately 7 months    Physical Exam:    General Appearance:  Alert, cooperative, no distress, appears stated age and is moderately overweight     Head:  Normocephalic, without obvious abnormality, atraumatic   Eyes:  PERRL, conjunctiva/corneas clear, EOM's intact,   both eyes   Ears:  Normal TM's and external ear canals, both ears   Nose: Nares normal, septum midline, mucosa normal, no drainage or sinus tenderness   Throat: Lips, mucosa, and tongue normal; teeth and gums normal   Neck: Supple, symmetrical, trachea midline, no adenopathy, thyroid: not enlarged, symmetric, no tenderness/mass/nodules, no carotid bruit or JVD   Back:   Symmetric, no curvature, ROM normal, no CVA tenderness   Lungs:   Clear to auscultation bilaterally, respirations unlabored   Chest Wall:  No tenderness or deformity   Heart:  Regular rate and rhythm, S1, S2 normal, no murmur, rub or gallop   Abdomen:   Soft, non-tender, bowel sounds active all four quadrants,  no masses, no organomegaly   Extremities: Extremities normal, atraumatic, no cyanosis or edema   Pulses: 2+ and symmetric   Skin: Skin showed normal color, texture, turgor and no rashes or lesions   Lymph nodes: Cervical, supraclavicular, and axillary nodes normal   Neurologic: Normal         Cardiographics    ECG  03/26/2019:    Possible LAE  Incomplete right bundle branch block  Similar to 09/04/2018    Imaging    Chest X-Ray :  No Chest XR results available for this patient            Lab Review       Lab Results   Component Value Date    SODIUM 135 (L) 03/22/2019    K 4 1 03/22/2019     03/22/2019    CO2 23 03/22/2019    ANIONGAP 8 03/19/2015    BUN 18 03/22/2019    CREATININE 1 04 03/22/2019    GLUCOSE 83 03/19/2015    GLUF 87 08/07/2018    CALCIUM 9 5 03/22/2019    AST 15 08/07/2018    ALT 21 08/07/2018    ALKPHOS 52 08/07/2018    PROT 7 7 03/19/2015    BILITOT 0 59 03/19/2015    EGFR 62 03/22/2019 03/22/2019 PT and CBC:  Both normal    Lab Results   Component Value Date    CHOLESTEROL 154 08/07/2018    CHOLESTEROL 155 08/07/2018    CHOLESTEROL 186 07/01/2017     Lab Results   Component Value Date    HDL 62 (H) 08/07/2018    HDL 62 (H) 08/07/2018    HDL 76 (H) 07/01/2017     No results found for: LDLCHOLEST  Lab Results   Component Value Date    LDLCALC 66 08/07/2018    LDLCALC 67 08/07/2018    LDLCALC 88 07/01/2017     No components found for: DIRECTLDLREFLEX  Lab Results   Component Value Date    TRIG 130 08/07/2018    TRIG 129 08/07/2018    TRIG 111 07/01/2017         Lab Results   Component Value Date    GLUCOSE 83 03/19/2015    CALCIUM 9 5 03/22/2019     03/19/2015    K 4 1 03/22/2019    CO2 23 03/22/2019     03/22/2019    BUN 18 03/22/2019    CREATININE 1 04 03/22/2019           Chanelle Del Real MD

## 2019-03-26 NOTE — PATIENT INSTRUCTIONS
1  No cardiac contraindication to left plantar fasciitis surgery on 04/08/2019   2  Weight loss was encouraged  3  Continue current medication  4  Cardiology follow-up in 6 months with EKG, lipids, CMP

## 2019-03-29 ENCOUNTER — OFFICE VISIT (OUTPATIENT)
Dept: FAMILY MEDICINE CLINIC | Facility: MEDICAL CENTER | Age: 54
End: 2019-03-29
Payer: COMMERCIAL

## 2019-03-29 VITALS
HEART RATE: 70 BPM | SYSTOLIC BLOOD PRESSURE: 120 MMHG | BODY MASS INDEX: 28.94 KG/M2 | DIASTOLIC BLOOD PRESSURE: 78 MMHG | OXYGEN SATURATION: 98 % | WEIGHT: 160.8 LBS

## 2019-03-29 DIAGNOSIS — M72.2 PLANTAR FASCIITIS OF LEFT FOOT: Primary | ICD-10-CM

## 2019-03-29 DIAGNOSIS — Z01.818 PREOPERATIVE EVALUATION OF A MEDICAL CONDITION TO RULE OUT SURGICAL CONTRAINDICATIONS (TAR REQUIRED): ICD-10-CM

## 2019-03-29 PROCEDURE — 99243 OFF/OP CNSLTJ NEW/EST LOW 30: CPT | Performed by: FAMILY MEDICINE

## 2019-03-29 NOTE — PROGRESS NOTES
Patient is a 46-year-old woman who is going to undergo a left plantar fasciotomy on April 8, 2019  She has chronic plantar fasciitis  She is essentially healthy  She is treated for supraventricular tachycardia by the cardiologist   She takes metoprolol 50 mg q day  She also takes atorvastatin for hyperlipidemia  Both of these problems are well managed and stable  Patient is also had separate cardiac clearance by the cardiologist     She also sees gynecology and she takes estradiol and progesterone for postmenopausal symptoms  Except for her foot she feels well today    Review of systems for GI  cardiac pulmonary and neurologic systems are all negative  ENT review of systems is also negative  Family History   Problem Relation Age of Onset    Diabetes Mother         mellitus    Uterine cancer Mother     Obesity Mother     COPD Father     Hypothyroidism Sister     Atrial fibrillation Brother     Heart failure Maternal Grandmother     Heart failure Maternal Grandfather     Heart attack Paternal Grandmother     Diabetes Family         mellitus    Cancer Family     No Known Problems Sister      Social History     Socioeconomic History    Marital status:      Spouse name: Not on file    Number of children: Not on file    Years of education: Not on file    Highest education level: Not on file   Occupational History    Not on file   Social Needs    Financial resource strain: Not on file    Food insecurity:     Worry: Not on file     Inability: Not on file    Transportation needs:     Medical: Not on file     Non-medical: Not on file   Tobacco Use    Smoking status: Never Smoker    Smokeless tobacco: Never Used   Substance and Sexual Activity    Alcohol use:  Yes     Alcohol/week: 1 2 oz     Types: 2 Glasses of wine per week     Comment: occasional alcohol use    Drug use: No    Sexual activity: Yes     Partners: Male     Birth control/protection: Post-menopausal   Lifestyle  Physical activity:     Days per week: Not on file     Minutes per session: Not on file    Stress: Not on file   Relationships    Social connections:     Talks on phone: Not on file     Gets together: Not on file     Attends Jainism service: Not on file     Active member of club or organization: Not on file     Attends meetings of clubs or organizations: Not on file     Relationship status: Not on file    Intimate partner violence:     Fear of current or ex partner: Not on file     Emotionally abused: Not on file     Physically abused: Not on file     Forced sexual activity: Not on file   Other Topics Concern    Not on file   Social History Narrative    Per allscripts;     Caffeine use    Coffee    Exercise: Walking         Past Medical History:   Diagnosis Date    Carpal tunnel syndrome     Right; last assessed 16    Functional heart murmur in      H/O cardiac disorder     Heart beat abnormality     Heart palpitations     History of paroxysmal supraventricular tachycardia     History of urinary tract infection     Hx of bleeding disorder     abnormal uterine bleeding    Hx of migraines     Iritis     Irregular heart beat     Lightheadedness     Uterine leiomyoma     resolved; 16    Wears contact lenses     Wears glasses        Current Outpatient Medications:     atorvastatin (LIPITOR) 20 mg tablet, Take 1 tablet (20 mg total) by mouth daily, Disp: 90 tablet, Rfl: 3    Cranberry-Vit C-Lactobacillus (CRANBERRY/PROBIOTIC/VIT C) 450-30 MG TABS, Take 1 tablet by mouth daily, Disp: , Rfl:     estradiol (ESTRACE) 0 5 MG tablet, Take 1 tablet (0 5 mg total) by mouth daily, Disp: 90 tablet, Rfl: 0    metoprolol succinate (TOPROL-XL) 50 mg 24 hr tablet, Take 1 tablet (50 mg total) by mouth daily, Disp: 90 tablet, Rfl: 3    patient supplied medication, , Disp: , Rfl:     progesterone (PROMETRIUM) 100 MG capsule, Take 1 capsule (100 mg total) by mouth daily at bedtime, Disp: 90 capsule, Rfl: 0  Allergies   Allergen Reactions    Levaquin [Levofloxacin] Anaphylaxis and Swelling     L     Past Surgical History:   Procedure Laterality Date    CARPAL TUNNEL RELEASE      FOOT SURGERY      HERNIA REPAIR      pt was 5months old    HYSTERECTOMY      LASER ABLATION N/A     vaginal lesion(s)    MOLE EXCISION      [place] ANKLE SCOPE,PLANTAR FASCIOTOMY Right 7/21/2017    Procedure: RELEASE FASCIA PLANTAR/FASCIOTOMY ENDOSCOPIC (EPF); Surgeon: Ariadne Ashraf DPM;  [facility];  Service: Podiatry    KS CYSTOURETHROSCOPY N/A 1/12/2016    Procedure: CYSTOSCOPY;  Surgeon: Shauna Mariee DO;  [facility];  Service: Gynecology    KS LAPAROSCOPY W TOT HYSTERECTUTERUS <=250 GRAM  W TUBE/OVARY N/A 1/12/2016    Procedure: TOTAL LAPAROSCOPIC HYSTERECTOMY ;  Surgeon: Shauna Mariee DO;  [facility];  Service: Gynecology    KS WRIST Rodrigo Beatrice LIG Right 1/29/2016    Procedure: ENDOSCOPIC CARPAL TUNNEL RELEASE;  Surgeon: Mick Tsang MD;  [facility];  Service: Orthopedics    SALPINGECTOMY Bilateral 1/12/2016    Procedure: REMOVAL OF BOTH FALLOPIAN TUBES;  Surgeon: Shauna Mariee DO;  [facility];  Service:     TUBAL LIGATION       /78 (BP Location: Left arm, Patient Position: Sitting, Cuff Size: Adult)   Pulse 70   Wt 72 9 kg (160 lb 12 8 oz)   SpO2 98%   BMI 28 94 kg/m²     HEENT examination is within normal limits no acute findings  Neck was supple  Chest clear  Cardiac exam revealed a regular rate and rhythm without murmur rub or gallop  Abdomen is soft and nontender  I see nothing that would contraindicate planned procedure

## 2019-04-07 ENCOUNTER — ANESTHESIA EVENT (OUTPATIENT)
Dept: PERIOP | Facility: HOSPITAL | Age: 54
End: 2019-04-07
Payer: COMMERCIAL

## 2019-04-08 ENCOUNTER — ANESTHESIA (OUTPATIENT)
Dept: PERIOP | Facility: HOSPITAL | Age: 54
End: 2019-04-08
Payer: COMMERCIAL

## 2019-04-08 ENCOUNTER — HOSPITAL ENCOUNTER (OUTPATIENT)
Facility: HOSPITAL | Age: 54
Setting detail: OUTPATIENT SURGERY
Discharge: HOME | End: 2019-04-08
Attending: PODIATRIST | Admitting: PODIATRIST
Payer: COMMERCIAL

## 2019-04-08 VITALS
RESPIRATION RATE: 16 BRPM | SYSTOLIC BLOOD PRESSURE: 127 MMHG | HEART RATE: 67 BPM | TEMPERATURE: 97.1 F | OXYGEN SATURATION: 100 % | DIASTOLIC BLOOD PRESSURE: 64 MMHG

## 2019-04-08 DIAGNOSIS — M72.2 PLANTAR FASCIAL FIBROMATOSIS: ICD-10-CM

## 2019-04-08 DIAGNOSIS — Z98.890 S/P FOOT SURGERY, LEFT: Primary | ICD-10-CM

## 2019-04-08 PROCEDURE — 88305 TISSUE EXAM BY PATHOLOGIST: CPT | Performed by: PATHOLOGY

## 2019-04-08 RX ORDER — PROPOFOL 10 MG/ML
INJECTION, EMULSION INTRAVENOUS AS NEEDED
Status: DISCONTINUED | OUTPATIENT
Start: 2019-04-08 | End: 2019-04-08 | Stop reason: SURG

## 2019-04-08 RX ORDER — MIDAZOLAM HYDROCHLORIDE 1 MG/ML
INJECTION INTRAMUSCULAR; INTRAVENOUS AS NEEDED
Status: DISCONTINUED | OUTPATIENT
Start: 2019-04-08 | End: 2019-04-08 | Stop reason: SURG

## 2019-04-08 RX ORDER — LIDOCAINE HYDROCHLORIDE 10 MG/ML
INJECTION, SOLUTION INFILTRATION; PERINEURAL AS NEEDED
Status: DISCONTINUED | OUTPATIENT
Start: 2019-04-08 | End: 2019-04-08 | Stop reason: SURG

## 2019-04-08 RX ORDER — DEXAMETHASONE SODIUM PHOSPHATE 4 MG/ML
4 INJECTION, SOLUTION INTRA-ARTICULAR; INTRALESIONAL; INTRAMUSCULAR; INTRAVENOUS; SOFT TISSUE ONCE AS NEEDED
Status: DISCONTINUED | OUTPATIENT
Start: 2019-04-08 | End: 2019-04-08 | Stop reason: HOSPADM

## 2019-04-08 RX ORDER — BUPIVACAINE HYDROCHLORIDE AND EPINEPHRINE 5; 5 MG/ML; UG/ML
INJECTION, SOLUTION EPIDURAL; INTRACAUDAL; PERINEURAL AS NEEDED
Status: DISCONTINUED | OUTPATIENT
Start: 2019-04-08 | End: 2019-04-08 | Stop reason: HOSPADM

## 2019-04-08 RX ORDER — CEFAZOLIN SODIUM 2 G/50ML
2000 SOLUTION INTRAVENOUS ONCE
Status: COMPLETED | OUTPATIENT
Start: 2019-04-08 | End: 2019-04-08

## 2019-04-08 RX ORDER — HYDROCODONE BITARTRATE AND ACETAMINOPHEN 5; 325 MG/1; MG/1
1 TABLET ORAL EVERY 6 HOURS PRN
Status: DISCONTINUED | OUTPATIENT
Start: 2019-04-08 | End: 2019-04-08 | Stop reason: HOSPADM

## 2019-04-08 RX ORDER — DIPHENHYDRAMINE HYDROCHLORIDE 50 MG/ML
12.5 INJECTION INTRAMUSCULAR; INTRAVENOUS ONCE
Status: DISCONTINUED | OUTPATIENT
Start: 2019-04-08 | End: 2019-04-08 | Stop reason: HOSPADM

## 2019-04-08 RX ORDER — FENTANYL CITRATE/PF 50 MCG/ML
12.5 SYRINGE (ML) INJECTION
Status: DISCONTINUED | OUTPATIENT
Start: 2019-04-08 | End: 2019-04-08 | Stop reason: HOSPADM

## 2019-04-08 RX ORDER — FENTANYL CITRATE/PF 50 MCG/ML
25 SYRINGE (ML) INJECTION
Status: DISCONTINUED | OUTPATIENT
Start: 2019-04-08 | End: 2019-04-08 | Stop reason: HOSPADM

## 2019-04-08 RX ORDER — SODIUM CHLORIDE, SODIUM LACTATE, POTASSIUM CHLORIDE, CALCIUM CHLORIDE 600; 310; 30; 20 MG/100ML; MG/100ML; MG/100ML; MG/100ML
75 INJECTION, SOLUTION INTRAVENOUS CONTINUOUS
Status: DISCONTINUED | OUTPATIENT
Start: 2019-04-08 | End: 2019-04-08 | Stop reason: HOSPADM

## 2019-04-08 RX ORDER — HYDROCODONE BITARTRATE AND ACETAMINOPHEN 5; 325 MG/1; MG/1
1 TABLET ORAL EVERY 6 HOURS PRN
Qty: 10 TABLET | Refills: 0 | Status: SHIPPED | OUTPATIENT
Start: 2019-04-08 | End: 2019-04-16 | Stop reason: ALTCHOICE

## 2019-04-08 RX ORDER — FENTANYL CITRATE 50 UG/ML
INJECTION, SOLUTION INTRAMUSCULAR; INTRAVENOUS AS NEEDED
Status: DISCONTINUED | OUTPATIENT
Start: 2019-04-08 | End: 2019-04-08 | Stop reason: SURG

## 2019-04-08 RX ORDER — MAGNESIUM HYDROXIDE 1200 MG/15ML
LIQUID ORAL AS NEEDED
Status: DISCONTINUED | OUTPATIENT
Start: 2019-04-08 | End: 2019-04-08 | Stop reason: HOSPADM

## 2019-04-08 RX ORDER — PROPOFOL 10 MG/ML
INJECTION, EMULSION INTRAVENOUS CONTINUOUS PRN
Status: DISCONTINUED | OUTPATIENT
Start: 2019-04-08 | End: 2019-04-08 | Stop reason: SURG

## 2019-04-08 RX ADMIN — FENTANYL CITRATE 50 MCG: 50 INJECTION INTRAMUSCULAR; INTRAVENOUS at 13:03

## 2019-04-08 RX ADMIN — SODIUM CHLORIDE, POTASSIUM CHLORIDE, SODIUM LACTATE AND CALCIUM CHLORIDE: 600; 310; 30; 20 INJECTION, SOLUTION INTRAVENOUS at 12:30

## 2019-04-08 RX ADMIN — CEFAZOLIN SODIUM 2000 MG: 2 SOLUTION INTRAVENOUS at 13:04

## 2019-04-08 RX ADMIN — LIDOCAINE HYDROCHLORIDE 50 MG: 10 INJECTION, SOLUTION INFILTRATION; PERINEURAL at 13:03

## 2019-04-08 RX ADMIN — FENTANYL CITRATE 50 MCG: 50 INJECTION INTRAMUSCULAR; INTRAVENOUS at 13:07

## 2019-04-08 RX ADMIN — SODIUM CHLORIDE, POTASSIUM CHLORIDE, SODIUM LACTATE AND CALCIUM CHLORIDE 75 ML/HR: 600; 310; 30; 20 INJECTION, SOLUTION INTRAVENOUS at 12:28

## 2019-04-08 RX ADMIN — PROPOFOL 100 MG: 10 INJECTION, EMULSION INTRAVENOUS at 13:03

## 2019-04-08 RX ADMIN — MIDAZOLAM HYDROCHLORIDE 2 MG: 1 INJECTION, SOLUTION INTRAMUSCULAR; INTRAVENOUS at 13:03

## 2019-04-08 RX ADMIN — PROPOFOL 75 MCG/KG/MIN: 10 INJECTION, EMULSION INTRAVENOUS at 13:06

## 2019-04-16 ENCOUNTER — OFFICE VISIT (OUTPATIENT)
Dept: FAMILY MEDICINE CLINIC | Facility: CLINIC | Age: 54
End: 2019-04-16
Payer: COMMERCIAL

## 2019-04-16 VITALS
HEART RATE: 75 BPM | OXYGEN SATURATION: 98 % | HEIGHT: 62 IN | TEMPERATURE: 97.3 F | WEIGHT: 155 LBS | BODY MASS INDEX: 28.52 KG/M2 | DIASTOLIC BLOOD PRESSURE: 80 MMHG | RESPIRATION RATE: 16 BRPM | SYSTOLIC BLOOD PRESSURE: 138 MMHG

## 2019-04-16 DIAGNOSIS — H65.01 NON-RECURRENT ACUTE SEROUS OTITIS MEDIA OF RIGHT EAR: Primary | ICD-10-CM

## 2019-04-16 DIAGNOSIS — J01.00 ACUTE NON-RECURRENT MAXILLARY SINUSITIS: ICD-10-CM

## 2019-04-16 PROCEDURE — 99213 OFFICE O/P EST LOW 20 MIN: CPT | Performed by: FAMILY MEDICINE

## 2019-04-16 PROCEDURE — 3008F BODY MASS INDEX DOCD: CPT | Performed by: FAMILY MEDICINE

## 2019-04-16 PROCEDURE — 1036F TOBACCO NON-USER: CPT | Performed by: FAMILY MEDICINE

## 2019-04-16 PROCEDURE — 1111F DSCHRG MED/CURRENT MED MERGE: CPT | Performed by: FAMILY MEDICINE

## 2019-04-16 RX ORDER — AZITHROMYCIN 250 MG/1
TABLET, FILM COATED ORAL
Qty: 6 TABLET | Refills: 0 | Status: SHIPPED | OUTPATIENT
Start: 2019-04-16 | End: 2019-04-20

## 2019-06-17 DIAGNOSIS — N95.1 SYMPTOMATIC MENOPAUSAL OR FEMALE CLIMACTERIC STATES: ICD-10-CM

## 2019-07-02 DIAGNOSIS — N95.1 SYMPTOMATIC MENOPAUSAL OR FEMALE CLIMACTERIC STATES: ICD-10-CM

## 2019-07-02 RX ORDER — ESTRADIOL 0.5 MG/1
0.5 TABLET ORAL DAILY
Qty: 90 TABLET | Refills: 0 | Status: SHIPPED | OUTPATIENT
Start: 2019-07-02 | End: 2019-09-23 | Stop reason: SDUPTHER

## 2019-07-02 NOTE — TELEPHONE ENCOUNTER
Patient calling for refill on estradiol      Annual exam scheduled for 9/23/19    Routing to provider to order

## 2019-08-26 ENCOUNTER — OFFICE VISIT (OUTPATIENT)
Dept: FAMILY MEDICINE CLINIC | Facility: MEDICAL CENTER | Age: 54
End: 2019-08-26
Payer: COMMERCIAL

## 2019-08-26 VITALS
WEIGHT: 162 LBS | DIASTOLIC BLOOD PRESSURE: 82 MMHG | BODY MASS INDEX: 29.81 KG/M2 | HEART RATE: 82 BPM | SYSTOLIC BLOOD PRESSURE: 120 MMHG | OXYGEN SATURATION: 98 % | HEIGHT: 62 IN

## 2019-08-26 DIAGNOSIS — M25.551 CHRONIC ARTHRALGIAS OF KNEES AND HIPS: ICD-10-CM

## 2019-08-26 DIAGNOSIS — Z00.00 PREVENTATIVE HEALTH CARE: Primary | ICD-10-CM

## 2019-08-26 DIAGNOSIS — M25.561 CHRONIC ARTHRALGIAS OF KNEES AND HIPS: ICD-10-CM

## 2019-08-26 DIAGNOSIS — Z86.79 HISTORY OF PAROXYSMAL SUPRAVENTRICULAR TACHYCARDIA: ICD-10-CM

## 2019-08-26 DIAGNOSIS — R23.8 EASY BRUISING: ICD-10-CM

## 2019-08-26 DIAGNOSIS — M25.552 CHRONIC ARTHRALGIAS OF KNEES AND HIPS: ICD-10-CM

## 2019-08-26 DIAGNOSIS — E78.00 PURE HYPERCHOLESTEROLEMIA: ICD-10-CM

## 2019-08-26 DIAGNOSIS — Z12.11 SCREENING FOR MALIGNANT NEOPLASM OF COLON: ICD-10-CM

## 2019-08-26 DIAGNOSIS — M25.562 CHRONIC ARTHRALGIAS OF KNEES AND HIPS: ICD-10-CM

## 2019-08-26 DIAGNOSIS — Z13.820 SCREENING FOR OSTEOPOROSIS: ICD-10-CM

## 2019-08-26 DIAGNOSIS — G89.29 CHRONIC ARTHRALGIAS OF KNEES AND HIPS: ICD-10-CM

## 2019-08-26 DIAGNOSIS — K21.9 GASTROESOPHAGEAL REFLUX DISEASE WITHOUT ESOPHAGITIS: ICD-10-CM

## 2019-08-26 DIAGNOSIS — Z13.29 SCREENING FOR THYROID DISORDER: ICD-10-CM

## 2019-08-26 PROCEDURE — 99396 PREV VISIT EST AGE 40-64: CPT | Performed by: FAMILY MEDICINE

## 2019-08-26 PROCEDURE — 99214 OFFICE O/P EST MOD 30 MIN: CPT | Performed by: FAMILY MEDICINE

## 2019-08-26 PROCEDURE — 3008F BODY MASS INDEX DOCD: CPT | Performed by: FAMILY MEDICINE

## 2019-08-26 NOTE — PROGRESS NOTES
Assessment/Plan:    No problem-specific Assessment & Plan notes found for this encounter  Diagnoses and all orders for this visit:    Gastroesophageal reflux disease without esophagitis  -     Ambulatory referral to Gastroenterology; Future    Screening for osteoporosis  -     DXA bone density spine hip and pelvis; Future    Chronic arthralgias of knees and hips  -     C-reactive protein; Future  -     RF Screen w/ Reflex to Titer; Future    Screening for malignant neoplasm of colon  -     Ambulatory referral to Gastroenterology; Future    Screening for thyroid disorder  -     TSH, 3rd generation with Free T4 reflex; Future    Easy bruising  -     CBC and differential; Future    History of paroxysmal supraventricular tachycardia    Pure hypercholesterolemia          Subjective:      Patient ID: Nini King is a 48 y o  female  Patient is here for annual preventative maintenance visit as well as follow-up of ongoing medical issues  Lives with boyfriend, has adult children  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  Past medical history, past surgical history, family medical history, social history, and medication list were all reviewed  Referred for Colonoscopy  Needs Mammo, sees Gyn in Sept       The following portions of the patient's history were reviewed and updated as appropriate: allergies, current medications, past family history, past medical history, past social history, past surgical history and problem list     Review of Systems   Constitutional: Negative for activity change, appetite change, fatigue and fever  HENT: Negative for congestion, ear pain, hearing loss, nosebleeds, postnasal drip, rhinorrhea and trouble swallowing  Eyes: Negative for photophobia, pain, redness and visual disturbance  Respiratory: Negative for cough, chest tightness, shortness of breath and wheezing  Cardiovascular: Negative for chest pain and palpitations     Gastrointestinal: Positive for abdominal pain (Frequent heartburn)  Negative for blood in stool, constipation, diarrhea, nausea and vomiting  Endocrine: Negative for cold intolerance, heat intolerance, polydipsia, polyphagia and polyuria  Genitourinary: Negative for difficulty urinating, dyspareunia, dysuria, flank pain, frequency, menstrual problem, pelvic pain, urgency, vaginal bleeding and vaginal discharge  Musculoskeletal: Negative for arthralgias, gait problem, joint swelling and myalgias  Skin: Negative for rash and wound  Neurological: Negative for dizziness, tremors, seizures, syncope, speech difficulty, weakness, light-headedness and headaches  Psychiatric/Behavioral: Negative for agitation, decreased concentration, dysphoric mood, sleep disturbance and suicidal ideas  The patient is not nervous/anxious  Objective:      /82 (BP Location: Left arm, Patient Position: Sitting, Cuff Size: Adult)   Pulse 82   Ht 5' 2" (1 575 m)   Wt 73 5 kg (162 lb)   SpO2 98%   BMI 29 63 kg/m²          Physical Exam   Constitutional: She is oriented to person, place, and time  Vital signs are normal  She appears well-developed and well-nourished  She is cooperative  HENT:   Head: Normocephalic  Right Ear: Hearing, external ear and ear canal normal    Left Ear: Hearing, tympanic membrane, external ear and ear canal normal    Nose: Nose normal  No mucosal edema or rhinorrhea  Mouth/Throat: Uvula is midline, oropharynx is clear and moist and mucous membranes are normal  No oropharyngeal exudate  Eyes: Pupils are equal, round, and reactive to light  Conjunctivae, EOM and lids are normal    Neck: Carotid bruit is not present  No thyroid mass and no thyromegaly present  Cardiovascular: Normal rate, regular rhythm, S1 normal, S2 normal, normal heart sounds and normal pulses  Exam reveals no gallop  No murmur heard  Pulmonary/Chest: Effort normal and breath sounds normal  She has no wheezes  She has no rales  Abdominal: Soft   Normal appearance, normal aorta and bowel sounds are normal  There is no hepatosplenomegaly  There is no tenderness  There is no CVA tenderness  No hernia  Musculoskeletal: Normal range of motion  Lymphadenopathy:     She has no cervical adenopathy  Neurological: She is oriented to person, place, and time  She has normal strength  No cranial nerve deficit or sensory deficit  Coordination normal    Skin: Skin is warm, dry and intact  No rash noted  Psychiatric: She has a normal mood and affect  Her speech is normal and behavior is normal  Judgment and thought content normal  Cognition and memory are normal    Nursing note and vitals reviewed

## 2019-08-27 ENCOUNTER — APPOINTMENT (OUTPATIENT)
Dept: LAB | Facility: CLINIC | Age: 54
End: 2019-08-27
Payer: COMMERCIAL

## 2019-08-27 DIAGNOSIS — E78.2 MIXED HYPERLIPIDEMIA: ICD-10-CM

## 2019-08-27 DIAGNOSIS — M25.551 CHRONIC ARTHRALGIAS OF KNEES AND HIPS: ICD-10-CM

## 2019-08-27 DIAGNOSIS — M25.561 CHRONIC ARTHRALGIAS OF KNEES AND HIPS: ICD-10-CM

## 2019-08-27 DIAGNOSIS — G89.29 CHRONIC ARTHRALGIAS OF KNEES AND HIPS: ICD-10-CM

## 2019-08-27 DIAGNOSIS — E66.3 OVERWEIGHT (BMI 25.0-29.9): ICD-10-CM

## 2019-08-27 DIAGNOSIS — R23.8 EASY BRUISING: ICD-10-CM

## 2019-08-27 DIAGNOSIS — M25.552 CHRONIC ARTHRALGIAS OF KNEES AND HIPS: ICD-10-CM

## 2019-08-27 DIAGNOSIS — Z13.29 SCREENING FOR THYROID DISORDER: ICD-10-CM

## 2019-08-27 DIAGNOSIS — M25.562 CHRONIC ARTHRALGIAS OF KNEES AND HIPS: ICD-10-CM

## 2019-08-27 PROBLEM — E78.00 PURE HYPERCHOLESTEROLEMIA: Status: ACTIVE | Noted: 2019-08-27

## 2019-08-27 PROBLEM — E78.5 BORDERLINE HYPERLIPIDEMIA: Status: ACTIVE | Noted: 2019-08-27

## 2019-08-27 LAB
ALBUMIN SERPL BCP-MCNC: 3.7 G/DL (ref 3.5–5)
ALP SERPL-CCNC: 64 U/L (ref 46–116)
ALT SERPL W P-5'-P-CCNC: 28 U/L (ref 12–78)
ANION GAP SERPL CALCULATED.3IONS-SCNC: 3 MMOL/L (ref 4–13)
AST SERPL W P-5'-P-CCNC: 18 U/L (ref 5–45)
BASOPHILS # BLD AUTO: 0.04 THOUSANDS/ΜL (ref 0–0.1)
BASOPHILS NFR BLD AUTO: 1 % (ref 0–1)
BILIRUB SERPL-MCNC: 0.54 MG/DL (ref 0.2–1)
BUN SERPL-MCNC: 14 MG/DL (ref 5–25)
CALCIUM SERPL-MCNC: 8.8 MG/DL (ref 8.3–10.1)
CHLORIDE SERPL-SCNC: 106 MMOL/L (ref 100–108)
CHOLEST SERPL-MCNC: 165 MG/DL (ref 50–200)
CO2 SERPL-SCNC: 27 MMOL/L (ref 21–32)
CREAT SERPL-MCNC: 0.94 MG/DL (ref 0.6–1.3)
CRP SERPL QL: 3 MG/L
EOSINOPHIL # BLD AUTO: 0.09 THOUSAND/ΜL (ref 0–0.61)
EOSINOPHIL NFR BLD AUTO: 1 % (ref 0–6)
ERYTHROCYTE [DISTWIDTH] IN BLOOD BY AUTOMATED COUNT: 12.2 % (ref 11.6–15.1)
GFR SERPL CREATININE-BSD FRML MDRD: 70 ML/MIN/1.73SQ M
GLUCOSE P FAST SERPL-MCNC: 93 MG/DL (ref 65–99)
HCT VFR BLD AUTO: 40.4 % (ref 34.8–46.1)
HDLC SERPL-MCNC: 62 MG/DL (ref 40–60)
HGB BLD-MCNC: 13.3 G/DL (ref 11.5–15.4)
IMM GRANULOCYTES # BLD AUTO: 0.02 THOUSAND/UL (ref 0–0.2)
IMM GRANULOCYTES NFR BLD AUTO: 0 % (ref 0–2)
LDLC SERPL CALC-MCNC: 66 MG/DL (ref 0–100)
LYMPHOCYTES # BLD AUTO: 2.28 THOUSANDS/ΜL (ref 0.6–4.47)
LYMPHOCYTES NFR BLD AUTO: 35 % (ref 14–44)
MCH RBC QN AUTO: 31.5 PG (ref 26.8–34.3)
MCHC RBC AUTO-ENTMCNC: 32.9 G/DL (ref 31.4–37.4)
MCV RBC AUTO: 96 FL (ref 82–98)
MONOCYTES # BLD AUTO: 0.6 THOUSAND/ΜL (ref 0.17–1.22)
MONOCYTES NFR BLD AUTO: 9 % (ref 4–12)
NEUTROPHILS # BLD AUTO: 3.42 THOUSANDS/ΜL (ref 1.85–7.62)
NEUTS SEG NFR BLD AUTO: 54 % (ref 43–75)
NONHDLC SERPL-MCNC: 103 MG/DL
NRBC BLD AUTO-RTO: 0 /100 WBCS
PLATELET # BLD AUTO: 246 THOUSANDS/UL (ref 149–390)
PMV BLD AUTO: 11.6 FL (ref 8.9–12.7)
POTASSIUM SERPL-SCNC: 4.3 MMOL/L (ref 3.5–5.3)
PROT SERPL-MCNC: 7.4 G/DL (ref 6.4–8.2)
RBC # BLD AUTO: 4.22 MILLION/UL (ref 3.81–5.12)
SODIUM SERPL-SCNC: 136 MMOL/L (ref 136–145)
TRIGL SERPL-MCNC: 186 MG/DL
TSH SERPL DL<=0.05 MIU/L-ACNC: 2.77 UIU/ML (ref 0.36–3.74)
WBC # BLD AUTO: 6.45 THOUSAND/UL (ref 4.31–10.16)

## 2019-08-27 PROCEDURE — 80053 COMPREHEN METABOLIC PANEL: CPT

## 2019-08-27 PROCEDURE — 84443 ASSAY THYROID STIM HORMONE: CPT

## 2019-08-27 PROCEDURE — 80061 LIPID PANEL: CPT

## 2019-08-27 PROCEDURE — 86430 RHEUMATOID FACTOR TEST QUAL: CPT

## 2019-08-27 PROCEDURE — 86140 C-REACTIVE PROTEIN: CPT

## 2019-08-27 PROCEDURE — 85025 COMPLETE CBC W/AUTO DIFF WBC: CPT

## 2019-08-27 PROCEDURE — 36415 COLL VENOUS BLD VENIPUNCTURE: CPT

## 2019-08-27 NOTE — ASSESSMENT & PLAN NOTE
Patient has frequent heartburn despite taking OTC PPI  Will refer to gastroenterologist for further workup and evaluation  Continue PPI

## 2019-08-27 NOTE — ASSESSMENT & PLAN NOTE
Patient currently takes atorvastatin 20 mg  She has had good response  Will check lipid profile, continue atorvastatin, continue low-fat diet

## 2019-08-28 LAB — RHEUMATOID FACT SER QL LA: NEGATIVE

## 2019-09-03 ENCOUNTER — OFFICE VISIT (OUTPATIENT)
Dept: PODIATRY | Facility: CLINIC | Age: 54
End: 2019-09-03
Payer: COMMERCIAL

## 2019-09-03 VITALS
HEART RATE: 82 BPM | HEIGHT: 62 IN | WEIGHT: 157 LBS | SYSTOLIC BLOOD PRESSURE: 120 MMHG | BODY MASS INDEX: 28.89 KG/M2 | DIASTOLIC BLOOD PRESSURE: 82 MMHG

## 2019-09-03 DIAGNOSIS — G62.9 PERIPHERAL NERVE DISORDER: Primary | ICD-10-CM

## 2019-09-03 DIAGNOSIS — M79.672 LEFT FOOT PAIN: ICD-10-CM

## 2019-09-03 PROCEDURE — 99214 OFFICE O/P EST MOD 30 MIN: CPT | Performed by: PODIATRIST

## 2019-09-03 RX ORDER — GABAPENTIN 300 MG/1
300 CAPSULE ORAL
Qty: 30 CAPSULE | Refills: 0 | Status: SHIPPED | OUTPATIENT
Start: 2019-09-03 | End: 2019-11-22 | Stop reason: ALTCHOICE

## 2019-09-03 NOTE — PROGRESS NOTES
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  Discussed treatment options  These include cortisone injection in the level of the superficial peroneal nerve along with a medications such as gabapentin  Patient to begin gabapentin 300 mg HS  Reappoint 4 weeks  No problem-specific Assessment & Plan notes found for this encounter  Diagnoses and all orders for this visit:    Peripheral nerve disorder  -     gabapentin (NEURONTIN) 300 mg capsule; Take 1 capsule (300 mg total) by mouth daily at bedtime for 30 days    Left foot pain          Subjective:      Patient ID: Timbo Rojo is a 48 y o  female  HPI   Patient, a 61-year-old female in good health presents with concern regarding her left foot  Patient had an instep plantar fasciotomy performed by Dr Zechariah Peraza in April of 2019  This procedure was performed due to plantar fasciitis that did not respond to conservative care  Patient states that immediately after the surgery she had numbness on the top of her left foot as well as burning and tingling  She also feels a weakness in her ankle  Dr Sue Maravilla feels that it may have irritated a nerve and gave her an injection but it was not helpful  Patient is upset and is concerned that she must live with this discomfort forever  The following portions of the patient's history were reviewed and updated as appropriate: allergies, current medications, past family history, past medical history, past social history, past surgical history and problem list     Review of Systems   Constitutional: Negative  Cardiovascular: Negative  Gastrointestinal: Negative  Musculoskeletal: Negative  Neurological: Positive for numbness          Paresthesia             Objective:      /82 Comment: verbal  Pulse 82   Ht 5' 2" (1 575 m)   Wt 71 2 kg (157 lb) Comment: verbal  BMI 28 72 kg/m²          Physical Exam   Constitutional: She is oriented to person, place, and time  She appears well-developed and well-nourished  Cardiovascular: Regular rhythm and intact distal pulses  Temperature and turgor are within normal limits bilaterally   Musculoskeletal: Normal range of motion  She exhibits no edema, tenderness or deformity  Neurological: She is alert and oriented to person, place, and time  A sensory deficit is present  She exhibits normal muscle tone  Patient has a numb sort station in the dorsum of her left foot with the numbness present from the 3rd toe laterally  The numbness extends up to the level of the ankle  There is also discomfort in the region of the ankle  Patient is able to dorsiflex and plantar flex her toes  Skin: Capillary refill takes 2 to 3 seconds  No rash noted  No erythema  No pallor

## 2019-09-15 DIAGNOSIS — N95.1 SYMPTOMATIC MENOPAUSAL OR FEMALE CLIMACTERIC STATES: ICD-10-CM

## 2019-09-23 ENCOUNTER — ANNUAL EXAM (OUTPATIENT)
Dept: OBGYN CLINIC | Facility: CLINIC | Age: 54
End: 2019-09-23
Payer: COMMERCIAL

## 2019-09-23 VITALS
SYSTOLIC BLOOD PRESSURE: 122 MMHG | BODY MASS INDEX: 28 KG/M2 | HEIGHT: 63 IN | WEIGHT: 158 LBS | DIASTOLIC BLOOD PRESSURE: 72 MMHG

## 2019-09-23 DIAGNOSIS — Z01.419 WELL WOMAN EXAM WITH ROUTINE GYNECOLOGICAL EXAM: Primary | ICD-10-CM

## 2019-09-23 DIAGNOSIS — N95.1 SYMPTOMATIC MENOPAUSAL OR FEMALE CLIMACTERIC STATES: ICD-10-CM

## 2019-09-23 DIAGNOSIS — N95.2 POSTMENOPAUSAL ATROPHIC VAGINITIS: ICD-10-CM

## 2019-09-23 DIAGNOSIS — Z12.39 SCREENING FOR MALIGNANT NEOPLASM OF BREAST: ICD-10-CM

## 2019-09-23 PROCEDURE — 99396 PREV VISIT EST AGE 40-64: CPT | Performed by: OBSTETRICS & GYNECOLOGY

## 2019-09-23 RX ORDER — ESTRADIOL 0.5 MG/1
0.5 TABLET ORAL DAILY
Qty: 90 TABLET | Refills: 3 | Status: SHIPPED | OUTPATIENT
Start: 2019-09-23 | End: 2020-10-06 | Stop reason: SDUPTHER

## 2019-09-23 NOTE — PROGRESS NOTES
ASSESSMENT & PLAN: Jazz Vaughan is a 48 y o  [id number] with normal gynecologic exam     1   Routine well woman exam done today  2   Pap and HPV:Pap with HPV was not done today  Current ASCCP Guidelines reviewed  3   Mammogram ordered  Recommend yearly mammography  4   Colonoscopy recommended  5  The patient is sexually active  6  The following were reviewed in today's visit: breast self exam, mammography screening ordered, menopause, exercise and healthy diet  7  Patient to return to office in 12 months for annual exam      All questions have been answered to her satisfaction  CC:  Annual Gynecologic Examination    HPI: Jazz Vaughan is a 48 y o  [id number] who presents for annual gynecologic examination  She has the following concerns:  Vaginal dryness  She is on PO estradiol and Prometrium  Discussed vaginal estrogen, coconut oil  Patient to try coconut oil and then move to vaginal estrogen if no relief  Safe and effective use discussed with the patient, minimal systemic absorption of vaginal estrogen  Health Maintenance:    She exercises 7 days per week  She wears her seatbelt routinely  She does not perform regular monthly self breast exams  She feels safe at home  Patient tries to follow a balanced diet  Last mammogram: many years - scheduled for this year  Last colonoscopy: scheduled for this year          Past Medical History:   Diagnosis Date    Abnormal Pap smear of cervix     in the past     Carpal tunnel syndrome     Right    Functional heart murmur in      GERD (gastroesophageal reflux disease)     no meds    History of paroxysmal supraventricular tachycardia     History of urinary tract infection     recurrent in past     Hyperlipidemia     Miscarriage     Uterine leiomyoma     had hysterectomy    Wears contact lenses     Wears glasses        Past Surgical History:   Procedure Laterality Date    HERNIA REPAIR      pt was 5months old    LASER ABLATION N/A vaginal lesion(s)    MOLE EXCISION      FL ANKLE SCOPE,PLANTAR FASCIOTOMY Right 2017    Procedure: RELEASE FASCIA PLANTAR/FASCIOTOMY ENDOSCOPIC (EPF); Surgeon: Kirsten Young DPM;  [facility];  Service: Podiatry    FL CYSTOURETHROSCOPY N/A 2016    Procedure: Evone Oscoda;  Surgeon: Radha Kunz DO;  [facility];  Service: Gynecology    FL LAPAROSCOPY W TOT HYSTERECTUTERUS <=250 GRAM  W TUBE/OVARY N/A 2016    Procedure: TOTAL LAPAROSCOPIC HYSTERECTOMY ;  Surgeon: Radha Kunz DO;  [facility];  Service: Gynecology    FL PART EXCIS PLANTAR FASCIA Left 2019    Procedure: LEFT  INSTEP PLANTAR FASCIOTOMY;  Surgeon: Jose Martin Washington DPM;  Location: [facility];  Service: Podiatry    FL WRIST Rosary Galien LIG Right 2016    Procedure: ENDOSCOPIC CARPAL TUNNEL RELEASE;  Surgeon: Cynthia Reynaga MD;  [facility];  Service: Orthopedics    SALPINGECTOMY Bilateral 2016    Procedure: REMOVAL OF BOTH FALLOPIAN TUBES;  Surgeon: Radha Kunz DO;  [facility];  Service:     TUBAL LIGATION         Past OB/Gyn History:  Period Cycle (Days): (n/a post menopausal , hysterectomy )No LMP recorded  Patient has had a hysterectomy  Menstrual History:  OB History        4    Para   3    Term   3            AB   1    Living   3       SAB   1    TAB        Ectopic        Multiple        Live Births   3           Obstetric Comments   Menarche 18              No LMP recorded  Patient has had a hysterectomy  Period Cycle (Days): (n/a post menopausal , hysterectomy )    Menstrual history: Patient is post menopausal    History of sexually transmitted infection: No  Patient is currently sexually active  Birth control: postmenopausal  Last Pap: normal, prior to [facility]      Family History   Problem Relation Age of Onset    Diabetes Mother     Uterine cancer Mother     Obesity Mother    Kb Hughes COPD Father     Hypothyroidism Sister    Kb Hughes Atrial fibrillation Brother     Scleroderma Brother     Heart failure Maternal Grandmother     Heart failure Maternal Grandfather     Heart attack Paternal Grandmother     No Known Problems Sister     No Known Problems Daughter     No Known Problems Son     No Known Problems Paternal Grandfather     No Known Problems Daughter        Social History:  Social History     Socioeconomic History    Marital status:      Spouse name: Not on file    Number of children: Not on file    Years of education: Not on file    Highest education level: Not on file   Occupational History    Not on file   Social Needs    Financial resource strain: Not on file    Food insecurity:     Worry: Not on file     Inability: Not on file    Transportation needs:     Medical: Not on file     Non-medical: Not on file   Tobacco Use    Smoking status: Never Smoker    Smokeless tobacco: Never Used   Substance and Sexual Activity    Alcohol use:  Yes     Alcohol/week: 2 0 standard drinks     Types: 2 Glasses of wine per week     Comment: occasional alcohol use    Drug use: Never    Sexual activity: Yes     Partners: Male     Birth control/protection: Post-menopausal, None   Lifestyle    Physical activity:     Days per week: Not on file     Minutes per session: Not on file    Stress: Not on file   Relationships    Social connections:     Talks on phone: Not on file     Gets together: Not on file     Attends Taoist service: Not on file     Active member of club or organization: Not on file     Attends meetings of clubs or organizations: Not on file     Relationship status: Not on file    Intimate partner violence:     Fear of current or ex partner: Not on file     Emotionally abused: Not on file     Physically abused: Not on file     Forced sexual activity: Not on file   Other Topics Concern    Not on file   Social History Narrative    Per allscripts;     Caffeine use    Coffee    Exercise: Walking Presently lives with boyfriend  Patient is monogamous relationship  Patient is currently employed  Allergies   Allergen Reactions    Levaquin [Levofloxacin] Anaphylaxis and Swelling     L         Current Outpatient Medications:     atorvastatin (LIPITOR) 20 mg tablet, Take 1 tablet (20 mg total) by mouth daily, Disp: 90 tablet, Rfl: 3    Cranberry-Vit C-Lactobacillus (CRANBERRY/PROBIOTIC/VIT C) 450-30 MG TABS, Take 1 tablet by mouth daily, Disp: , Rfl:     estradiol (ESTRACE) 0 5 MG tablet, Take 1 tablet (0 5 mg total) by mouth daily, Disp: 90 tablet, Rfl: 3    gabapentin (NEURONTIN) 300 mg capsule, Take 1 capsule (300 mg total) by mouth daily at bedtime for 30 days, Disp: 30 capsule, Rfl: 0    metoprolol succinate (TOPROL-XL) 50 mg 24 hr tablet, Take 1 tablet (50 mg total) by mouth daily, Disp: 90 tablet, Rfl: 3    progesterone (PROMETRIUM) 100 MG capsule, Take 1 capsule (100 mg total) by mouth daily at bedtime, Disp: 90 capsule, Rfl: 3    conjugated estrogens (PREMARIN) vaginal cream, Insert 0 5 g into the vagina 2 (two) times a week, Disp: 42 5 g, Rfl: 0    Review of Systems:  Review of Systems   Constitutional: Negative for unexpected weight change  Respiratory: Negative for shortness of breath  Cardiovascular: Negative for chest pain  Gastrointestinal: Negative for abdominal distention, abdominal pain, blood in stool, constipation, nausea and vomiting  Genitourinary: Negative for difficulty urinating, dysuria, frequency, pelvic pain, urgency, vaginal bleeding and vaginal discharge  Neurological: Negative for headaches  Physical Exam:  /72   Ht 5' 2 75" (1 594 m)   Wt 71 7 kg (158 lb)   Breastfeeding? No   BMI 28 21 kg/m²    Physical Exam   Constitutional: She is oriented to person, place, and time  Vital signs are normal  She appears well-developed and well-nourished  Genitourinary: Pelvic exam was performed with patient supine   There is no rash, tenderness or lesion on the right labia  There is no rash, tenderness or lesion on the left labia  Vagina exhibits no lesion  Rugosity: moderate atrophy  No tenderness or bleeding in the vagina  No vaginal discharge found  Right adnexum does not display mass, does not display tenderness and does not display fullness  Left adnexum does not display mass, does not display tenderness and does not display fullness  Genitourinary Comments: Absent uterus and cervix  Cuff intact  No palpable masses  No lesions visualized  No bladder tenderness  HENT:   Head: Normocephalic  Neck: No thyromegaly present  Cardiovascular: Normal rate, regular rhythm and normal heart sounds  Pulmonary/Chest: Effort normal and breath sounds normal  No respiratory distress  Right breast exhibits no inverted nipple, no mass, no nipple discharge, no skin change and no tenderness  Left breast exhibits no inverted nipple, no mass, no nipple discharge, no skin change and no tenderness  Abdominal: Soft  She exhibits no distension  There is no tenderness  Neurological: She is alert and oriented to person, place, and time  Psychiatric: She has a normal mood and affect  Her behavior is normal    Vitals reviewed

## 2019-09-25 ENCOUNTER — OFFICE VISIT (OUTPATIENT)
Dept: CARDIOLOGY CLINIC | Facility: CLINIC | Age: 54
End: 2019-09-25
Payer: COMMERCIAL

## 2019-09-25 VITALS
DIASTOLIC BLOOD PRESSURE: 80 MMHG | SYSTOLIC BLOOD PRESSURE: 126 MMHG | OXYGEN SATURATION: 96 % | HEART RATE: 64 BPM | WEIGHT: 158 LBS | BODY MASS INDEX: 29.08 KG/M2 | HEIGHT: 62 IN

## 2019-09-25 DIAGNOSIS — E66.3 OVERWEIGHT (BMI 25.0-29.9): ICD-10-CM

## 2019-09-25 DIAGNOSIS — R00.2 INTERMITTENT PALPITATIONS: ICD-10-CM

## 2019-09-25 DIAGNOSIS — E78.2 MIXED HYPERLIPIDEMIA: ICD-10-CM

## 2019-09-25 DIAGNOSIS — I49.1 PREMATURE ATRIAL CONTRACTIONS: Primary | ICD-10-CM

## 2019-09-25 DIAGNOSIS — Z86.79 HISTORY OF PSVT (PAROXYSMAL SUPRAVENTRICULAR TACHYCARDIA): ICD-10-CM

## 2019-09-25 PROCEDURE — 93000 ELECTROCARDIOGRAM COMPLETE: CPT | Performed by: INTERNAL MEDICINE

## 2019-09-25 PROCEDURE — 99214 OFFICE O/P EST MOD 30 MIN: CPT | Performed by: INTERNAL MEDICINE

## 2019-09-25 NOTE — PROGRESS NOTES
Office Cardiology Progress Note  Nini King 48 y o  female MRN: [id number]  09/25/19  9:18 AM      ASSESSMENT:    1  Asymptomatic atrial bigeminy/trigeminy as of today  2  Much improved palpitations overall, with last flare up 1+ years  ago, and history of AVNRT/PSVT diagnosed in 2005  3  Mixed dyslipidemia with residual increase in triglycerides and prior 1 1% 10 year and 38 8% lifetime ASCVD risks  4  History of chest pain with normal nuclear stress test on 9/29/16   5  32 8 pound weight gain since January, 2015 and  13 pound weight gain in approximately  1+ years  6   Moderate overweight  7   White coat hypertension of recent onset with no evidence of hypertension today  8   Resolved cotton wool exudates on recent eye examination and bilateral iritis, recently treated with steroid eyedrops  Plan       Patient Instructions     1  Will check 24 hour Holter monitor in view of atrial bigeminy / trigeminy on EKG  2  Continue current medication  3  Consult with Neurology/podiatrist regarding appropriate dosing of gabapentin for left foot pain and paresthesias  4  We will contact the patient with the results of the 24 hour Holter monitor  5  Cardiology follow-up approximately six months with EKG  HPI    This 48 y o  female  denies new cardiopulmonary and medical symptoms  After her plantar fasciitis left foot surgery on 04/08/2019, she developed residual numbness tingling and discomfort in the left foot and was started on gabapentin 300 milligrams daily 2-3 months ago  This as not helped yet, but she is reluctant to increase the dose because of concern about side effects  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  History of PSVT (paroxysmal supraventricular tachycardia) Yes    Mixed hyperlipidemia     Intermittent palpitations     Overweight (BMI 25 0-29  9)     Premature atrial contractions         Review of Systems    All other systems negative, except as noted in history of present illness    Historical Information   Past Medical History:   Diagnosis Date    Abnormal Pap smear of cervix     in the past     Carpal tunnel syndrome     Right    Functional heart murmur in      GERD (gastroesophageal reflux disease)     no meds    History of paroxysmal supraventricular tachycardia     History of urinary tract infection     recurrent in past     Hyperlipidemia     Miscarriage     Uterine leiomyoma     had hysterectomy    Wears contact lenses     Wears glasses      Past Surgical History:   Procedure Laterality Date    HERNIA REPAIR      pt was 5months old    LASER ABLATION N/A     vaginal lesion(s)    MOLE EXCISION      [place] ANKLE SCOPE,PLANTAR FASCIOTOMY Right 2017    Procedure: RELEASE FASCIA PLANTAR/FASCIOTOMY ENDOSCOPIC (EPF);   Surgeon: Vaishnavi Sanchez DPM;  Location: [facility];  Service: Podiatry    W[place] CYSTOURETHROSCOPY N/A 2016    Procedure: Nasreen Camara;  Surgeon: Teodora Bowman DO;  [facility];  Service: Gynecology    W[place] LAPAROSCOPY W TOT HYSTERECTUTERUS <=250 GRAM  W TUBE/OVARY N/A 2016    Procedure: TOTAL LAPAROSCOPIC HYSTERECTOMY ;  Surgeon: Teodora Bowman DO;  [facility];  Service: Gynecology    W[place] PART EXCIS PLANTAR FASCIA Left 2019    Procedure: LEFT  INSTEP PLANTAR FASCIOTOMY;  Surgeon: Ninfa Flynn DPM;  Location: [address] [place] [address] MAIN OR;  Service: Podiatry    W[place] WRIST Sophia Credit LIG Right 2016    Procedure: ENDOSCOPIC CARPAL TUNNEL RELEASE;  Surgeon: Bekah Kern MD;  [facility];  Service: Orthopedics    SALPINGECTOMY Bilateral 1/12/2016    Procedure: REMOVAL OF BOTH FALLOPIAN TUBES;  Surgeon: Miladys Faustin DO;  [facility];  Service:     TUBAL LIGATION       Social History     Substance and Sexual Activity   Alcohol Use Yes    Alcohol/week: 2 0 standard drinks    Types: 2 Glasses of wine per week    Comment: occasional alcohol use     Social History     Substance and Sexual Activity   Drug Use Never     Social History     Tobacco Use   Smoking Status Never Smoker   Smokeless Tobacco Never Used       Family History:  Family History   Problem Relation Age of Onset    Diabetes Mother     Uterine cancer Mother     Obesity Mother     COPD Father     Hypothyroidism Sister     Atrial fibrillation Brother     Scleroderma Brother     Heart failure Maternal Grandmother     Heart failure Maternal Grandfather     Heart attack Paternal Grandmother     No Known Problems Sister     No Known Problems Daughter     No Known Problems Son     No Known Problems Paternal Grandfather     No Known Problems Daughter          Meds/Allergies     Prior to Admission medications    Medication Sig Start Date End Date Taking?  Authorizing Provider   atorvastatin (LIPITOR) 20 mg tablet Take 1 tablet (20 mg total) by mouth daily 12/11/18  Yes Maya Maxwell MD   conjugated estrogens (PREMARIN) vaginal cream Insert 0 5 g into the vagina 2 (two) times a week 9/23/19  Yes Miladys Faustin DO   Cranberry-Vit C-Lactobacillus (CRANBERRY/PROBIOTIC/VIT C) 450-30 MG TABS Take 1 tablet by mouth daily   Yes Historical Provider, MD   gabapentin (NEURONTIN) 300 mg capsule Take 1 capsule (300 mg total) by mouth daily at bedtime for 30 days 9/3/19 10/3/19 Yes Keith Hatch DPM   metoprolol succinate (TOPROL-XL) 50 mg 24 hr tablet Take 1 tablet (50 mg total) by mouth daily 2/18/19  Yes Maya Maxwell MD   progesterone (PROMETRIUM) 100 MG capsule Take 1 capsule (100 mg total) by mouth daily at bedtime 9/23/19  Yes Miladys Faustin DO estradiol (ESTRACE) 0 5 MG tablet Take 1 tablet (0 5 mg total) by mouth daily  Patient not taking: Reported on 9/25/2019 5/13/05   Darcy Álvarez,        Allergies   Allergen Reactions    Levaquin [Levofloxacin] Anaphylaxis and Swelling     L         Vitals:    09/25/19 0752   BP: 126/80   BP Location: Right arm   Patient Position: Sitting   Cuff Size: Standard   Pulse: 64   SpO2: 96%   Weight: 71 7 kg (158 lb)   Height: 5' 2" (1 575 m)       Body mass index is 28 9 kg/m²  2 pound weight gain in approximately six months  Physical Exam:    General Appearance:  Alert, cooperative, no distress, appears stated age and is mildly overweight     Head:  Normocephalic, without obvious abnormality, atraumatic   Eyes:  PERRL, conjunctiva/corneas clear, EOM's intact,   both eyes   Ears:  Normal TM's and external ear canals, both ears   Nose: Nares normal, septum midline, mucosa normal, no drainage or sinus tenderness   Throat: Lips, mucosa, and tongue normal; teeth and gums normal   Neck: Supple, symmetrical, trachea midline, no adenopathy, thyroid: not enlarged, symmetric, no tenderness/mass/nodules, no carotid bruit or JVD   Back:   Symmetric, no curvature, ROM normal, no CVA tenderness   Lungs:   Clear to auscultation bilaterally, respirations unlabored   Chest Wall:  No tenderness or deformity   Heart:  Regular rate and rhythm, S1, S2 normal, no murmur, rub or gallop   Abdomen:   Soft, non-tender, bowel sounds active all four quadrants,  no masses, no organomegaly   Extremities: Extremities normal, atraumatic, no cyanosis or edema   Pulses: 2+ and symmetric   Skin: Skin showed normal color, texture, turgor and no rashes or lesions   Lymph nodes: Cervical, supraclavicular, and axillary nodes normal   Neurologic: Normal         Cardiographics    ECG 09/25/19:    Atrial bigeminy/trigeminy, new compared to 03/26/2019  RV conduction delay, unchanged    IMAGING:    No Chest XR results available for this patient            LAB REVIEW:      Lab Results   Component Value Date    SODIUM 136 08/27/2019    K 4 3 08/27/2019     08/27/2019    CO2 27 08/27/2019    ANIONGAP 8 03/19/2015    BUN 14 08/27/2019    CREATININE 0 94 08/27/2019    GLUCOSE 83 03/19/2015    GLUF 93 08/27/2019    CALCIUM 8 8 08/27/2019    AST 18 08/27/2019    ALT 28 08/27/2019    ALKPHOS 64 08/27/2019    PROT 7 7 03/19/2015    BILITOT 0 59 03/19/2015    EGFR 70 08/27/2019     Lab Results   Component Value Date    CHOLESTEROL 165 08/27/2019    CHOLESTEROL 154 08/07/2018    CHOLESTEROL 155 08/07/2018     Lab Results   Component Value Date    HDL 62 (H) 08/27/2019    HDL 62 (H) 08/07/2018    HDL 62 (H) 08/07/2018     No results found for: LDLCHOLEST  Lab Results   Component Value Date    LDLCALC 66 08/27/2019    LDLCALC 66 08/07/2018    LDLCALC 67 08/07/2018     No components found for: [facility]  Lab Results   Component Value Date    TRIG 186 (H) 08/27/2019    TRIG 130 08/07/2018    TRIG 129 08/07/2018     CBC 08/27/2019:  Normal          Amanda Mckinley MD

## 2019-09-25 NOTE — PATIENT INSTRUCTIONS
1  Will check 24 hour Holter monitor in view of atrial bigeminy / trigeminy on EKG  2  Continue current medication  3  Consult with Neurology/podiatrist regarding appropriate dosing of gabapentin for left foot pain and paresthesias  4  We will contact the patient with the results of the 24 hour Holter monitor  5  Cardiology follow-up approximately six months with EKG

## 2019-10-01 ENCOUNTER — CLINICAL SUPPORT (OUTPATIENT)
Dept: FAMILY MEDICINE CLINIC | Facility: MEDICAL CENTER | Age: 54
End: 2019-10-01
Payer: COMMERCIAL

## 2019-10-01 DIAGNOSIS — Z23 NEED FOR SHINGLES VACCINE: Primary | ICD-10-CM

## 2019-10-01 PROCEDURE — 90750 HZV VACC RECOMBINANT IM: CPT

## 2019-10-01 PROCEDURE — 90471 IMMUNIZATION ADMIN: CPT

## 2019-10-02 ENCOUNTER — HOSPITAL ENCOUNTER (OUTPATIENT)
Dept: NON INVASIVE DIAGNOSTICS | Facility: HOSPITAL | Age: 54
Discharge: HOME | End: 2019-10-02
Payer: COMMERCIAL

## 2019-10-02 DIAGNOSIS — I49.1 PREMATURE ATRIAL CONTRACTIONS: ICD-10-CM

## 2019-10-02 DIAGNOSIS — Z86.79 HISTORY OF PSVT (PAROXYSMAL SUPRAVENTRICULAR TACHYCARDIA): ICD-10-CM

## 2019-10-02 PROCEDURE — 93225 XTRNL ECG REC<48 HRS REC: CPT

## 2019-10-02 PROCEDURE — 93226 XTRNL ECG REC<48 HR SCAN A/R: CPT

## 2019-10-04 PROCEDURE — 93227 XTRNL ECG REC<48 HR R&I: CPT

## 2019-10-07 ENCOUNTER — TELEPHONE (OUTPATIENT)
Dept: CARDIOLOGY CLINIC | Facility: CLINIC | Age: 54
End: 2019-10-07

## 2019-10-07 PROCEDURE — 88305 TISSUE EXAM BY PATHOLOGIST: CPT | Performed by: PATHOLOGY

## 2019-10-07 NOTE — TELEPHONE ENCOUNTER
----- Message from Daniel Sewell MD sent at 10/4/2019 11:13 PM EDT -----  Notify patient that her Holter monitor was normal, even when she was feeling flutters  No significant heart rhythm disturbances found  We will see her as planned

## 2019-10-08 ENCOUNTER — LAB REQUISITION (OUTPATIENT)
Dept: LAB | Facility: HOSPITAL | Age: 54
End: 2019-10-08
Payer: COMMERCIAL

## 2019-10-08 DIAGNOSIS — D22.5 MELANOCYTIC NEVI OF TRUNK: ICD-10-CM

## 2019-10-15 ENCOUNTER — OFFICE VISIT (OUTPATIENT)
Dept: GASTROENTEROLOGY | Facility: CLINIC | Age: 54
End: 2019-10-15
Payer: COMMERCIAL

## 2019-10-15 VITALS
WEIGHT: 159 LBS | SYSTOLIC BLOOD PRESSURE: 140 MMHG | BODY MASS INDEX: 29.08 KG/M2 | DIASTOLIC BLOOD PRESSURE: 80 MMHG | HEART RATE: 68 BPM

## 2019-10-15 DIAGNOSIS — R13.14 PHARYNGOESOPHAGEAL DYSPHAGIA: Primary | ICD-10-CM

## 2019-10-15 DIAGNOSIS — K21.9 GASTROESOPHAGEAL REFLUX DISEASE WITHOUT ESOPHAGITIS: ICD-10-CM

## 2019-10-15 DIAGNOSIS — Z12.11 SCREENING FOR MALIGNANT NEOPLASM OF COLON: ICD-10-CM

## 2019-10-15 PROCEDURE — 99244 OFF/OP CNSLTJ NEW/EST MOD 40: CPT | Performed by: INTERNAL MEDICINE

## 2019-10-15 NOTE — LETTER
October 15, 2019     Frances Stevens MD  [facility] [address]    Patient: Gee Luna   YOB: 1965   Date of Visit: 10/15/2019       Dear Dr Angelic Mathias: Thank you for referring Gee Luna to me for evaluation  Below are my notes for this consultation  If you have questions, please do not hesitate to call me  I look forward to following your patient along with you  Sincerely,        Panchito Basilio MD        CC: No Recipients  Panchito Basilio MD  10/15/2019  4:20 PM  Incomplete  [facility]    Dear Dee Dee Guy,     I had the pleasure of seeing your patient Gee Luna in the office today and I thank you for this kind referral        Chief Complaint:  Chronic reflux      HPI:  Gee Luna is a 48 y o  female who presents with 2 separate GI issues  The 1st is a history of chronic reflux  According to the patient she has reflux going back years  Recently was getting worse so 3 months ago she was placed on Prilosec  According to the patient this is helped significantly  She does have occasional dysphagia for solids but liquids  Patient denies any weight loss  No chest pain  No melena hematochezia  She has no change in the consistency of her skin  She denies any Raynaud's  There is no nocturnal symptomatology  No exertional symptomatology  There is no other particular exacerbating or remitting factor for reflux  She has never had an EGD  Patient denies any pain change in bowel, change in stool caliber melanotic stool, hematochezia, rectal bleeding, tenesmus, weight loss  She has never a colonoscopy and requires an initial average risk screening  No family history cancer  Patient has no other complaints at the present time         Review of Systems:   Constitutional: No fever or chills, feels well, no tiredness, no recent weight gain or weight loss     HENT: No complaints of earache, no hearing loss, no nosebleeds, no nasal discharge, no sore throat, no hoarseness  Eyes: No complaints of eye pain, no red eyes, no discharge from eyes, no itchy eyes  Cardiovascular: No complaints of slow heart rate, no fast heart rate, no chest pain, no palpitations, no leg claudication, no lower extremity edema  Respiratory: No complaints of shortness of breath, no wheezing, no cough, no SOB on exertion, no orthopnea  Gastrointestinal: As noted in HPI  Genitourinary: No complaints of dysuria, no incontinence, no hesitancy, no nocturia  Musculoskeletal: No complaints of arthralgia, no myalgias, no joint swelling or stiffness, no limb pain or swelling  Neurological: No complaints of headache, no confusion, no convulsions, no numbness or tingling, no dizziness or fainting, no limb weakness, no difficulty walking  Skin: No complaints of skin rash or skin lesions, no itching, no skin wound, no dry skin  Hematological/Lymphatic: No complaints of swollen glands, does not bleed easy  Allergic/Immunologic: No immunocompromised state  Endocrine:  No complaints of polyuria, no polydipsia  Psychiatric/Behavioral: is not suicidal, no sleep disturbances, no anxiety or depression, no change in personality, no emotional problems         Historical Information   Past Medical History:   Diagnosis Date    Abnormal Pap smear of cervix     in the past     Carpal tunnel syndrome     Right    Functional heart murmur in      GERD (gastroesophageal reflux disease)     no meds    History of paroxysmal supraventricular tachycardia     History of urinary tract infection     recurrent in past     Hyperlipidemia     Miscarriage     Uterine leiomyoma     had hysterectomy    Wears contact lenses     Wears glasses      Past Surgical History:   Procedure Laterality Date    HERNIA REPAIR      pt was 5months old    LASER ABLATION N/A     vaginal lesion(s)    MOLE EXCISION      [place] ANKLE SCOPE,PLANTAR FASCIOTOMY Right 2017    Procedure: RELEASE FASCIA PLANTAR/FASCIOTOMY ENDOSCOPIC (EPF);   Surgeon: Analia Goldberg DPM;  [facility];  Service: Podiatry    [place] CYSTOURETHROSCOPY N/A 1/12/2016    Procedure: CYSTOSCOPY;  Surgeon: Arabella Curiel DO;  [facility];  Service: Gynecology    [place] LAPAROSCOPY W TOT HYSTERECTUTERUS <=250 GRAM  W TUBE/OVARY N/A 1/12/2016    Procedure: TOTAL LAPAROSCOPIC HYSTERECTOMY ;  Surgeon: Arabella Curiel DO;  [facility];  Service: Gynecology    [place] PART EXCIS PLANTAR FASCIA Left 4/8/2019    Procedure: LEFT  INSTEP PLANTAR FASCIOTOMY;  Surgeon: Chato Green DPM;  [facility];  Service: Podiatry    NJ WRIST Colie Risen LIG Right 1/29/2016    Procedure: ENDOSCOPIC CARPAL TUNNEL RELEASE;  Surgeon: Dorothy Schirmer, MD;  [facility];  Service: Orthopedics    SALPINGECTOMY Bilateral 1/12/2016    Procedure: REMOVAL OF BOTH FALLOPIAN TUBES;  Surgeon: Arabella Curiel DO;  [facility];  Service:     TUBAL LIGATION       Social History   Social History     Substance and Sexual Activity   Alcohol Use Yes    Alcohol/week: 2 0 standard drinks    Types: 2 Glasses of wine per week    Comment: occasional alcohol use     Social History     Substance and Sexual Activity   Drug Use Never     Social History     Tobacco Use   Smoking Status Never Smoker   Smokeless Tobacco Never Used     Family History   Problem Relation Age of Onset    Diabetes Mother     Uterine cancer Mother     Obesity Mother     COPD Father     Hypothyroidism Sister     Atrial fibrillation Brother     Scleroderma Brother     Heart failure Maternal Grandmother     Heart failure Maternal Grandfather     Heart attack Paternal Grandmother     No Known Problems Sister     No Known Problems Daughter     No Known Problems Son     No Known Problems Paternal Grandfather     No Known Problems Daughter          Current Medications: has a current medication list which includes the following prescription(s): atorvastatin, conjugated estrogens, cranberry/probiotic/vit c, estradiol, metoprolol succinate, progesterone, and gabapentin  Vital Signs: /80   Pulse 68   Wt 72 1 kg (159 lb)   BMI 29 08 kg/m²      Physical Exam:   Constitutional  General Appearance: No acute distress, well appearing and well nourished  Head  Normocephalic  Eyes  Conjunctivae and lids: No swelling, erythema, or discharge  Pupils and irises: Equal, round and reactive to light  Ears, Nose, Mouth, and Throat  External inspection of ears and nose: Normal  Nasal mucosa, septum and turbinates: Normal without edema or erythema/   Oropharynx: Normal with no erythema, edema, exudate or lesions  Neck  Normal range of motion  Neck supple  Cardiovascular  Auscultation of the heart: Normal rate and rhythm, normal S1 and S2 without murmurs  Examination of the extremities for edema and/or varicosities: Normal  Pulmonary/Chest  Respiratory effort: No increased work of breathing or signs of respiratory distress  Auscultation of lungs: Clear to auscultation, equal breath sounds bilaterally, no wheezes, rales, no rhonchi  Abdomen  Abdomen: Non-tender, no masses  Liver and spleen: No hepatomegaly or splenomegaly  Musculoskeletal  Gait and station: normal   Digits and Nails: normal without clubbing or cyanosis  Inspection/palpation of joints, bones, and muscles: Normal  Neurological  No nystagmus or asterixis  Skin  Skin and subcutaneous tissue: Normal without rashes or lesions  Lymphatic  Palpation of the lymph nodes in neck: No lymphadenopathy     Psychiatric  Orientation to person, place and time: Normal   Mood and affect: Normal          Labs:   Lab Results   Component Value Date    ALT 28 08/27/2019    AST 18 08/27/2019    BUN 14 08/27/2019    CALCIUM 8 8 08/27/2019     08/27/2019    CHOL 212 06/30/2015    CO2 27 08/27/2019    CREATININE 0 94 08/27/2019    HDL 62 (H) 08/27/2019    HCT 40 4 08/27/2019 HGB 13 3 08/27/2019    HGBA1C 5 3 08/07/2018     08/27/2019    K 4 3 08/27/2019     03/19/2015    TRIG 186 (H) 08/27/2019    WBC 6 45 08/27/2019         X-Rays & Procedures:   No orders to display         ______________________________________________________________________      Assessment & Plan:      Diagnoses and all orders for this visit:    Pharyngoesophageal dysphagia    Gastroesophageal reflux disease without esophagitis  -     Ambulatory referral to Gastroenterology    Screening for malignant neoplasm of colon  -     Ambulatory referral to Gastroenterology          I have taken liberty of scheduling the patient for both EGD and colonoscopy  She will continue Prilosec  I Will be happy to you of her results and any further recommendations  I  Would like to thank you for allowing me to participate in care            With warmest regards,    Michael Xiong MD, [facility]

## 2019-10-15 NOTE — H&P (VIEW-ONLY)
[facility]    Dear [facility],     I had the pleasure of seeing your patient Terra Bernal in the office today and I thank you for this kind referral        Chief Complaint:  Chronic reflux      HPI:  Terra Bernal is a 48 y o  female who presents with 2 separate GI issues  The 1st is a history of chronic reflux  According to the patient she has reflux going back years  Recently was getting worse so 3 months ago she was placed on Prilosec  According to the patient this is helped significantly  She does have occasional dysphagia for solids but liquids  Patient denies any weight loss  No chest pain  No melena hematochezia  She has no change in the consistency of her skin  She denies any Raynaud's  There is no nocturnal symptomatology  No exertional symptomatology  There is no other particular exacerbating or remitting factor for reflux  She has never had an EGD  Patient denies any pain change in bowel, change in stool caliber melanotic stool, hematochezia, rectal bleeding, tenesmus, weight loss  She has never a colonoscopy and requires an initial average risk screening  No family history cancer  Patient has no other complaints at the present time         Review of Systems:   Constitutional: No fever or chills, feels well, no tiredness, no recent weight gain or weight loss  HENT: No complaints of earache, no hearing loss, no nosebleeds, no nasal discharge, no sore throat, no hoarseness  Eyes: No complaints of eye pain, no red eyes, no discharge from eyes, no itchy eyes  Cardiovascular: No complaints of slow heart rate, no fast heart rate, no chest pain, no palpitations, no leg claudication, no lower extremity edema  Respiratory: No complaints of shortness of breath, no wheezing, no cough, no SOB on exertion, no orthopnea  Gastrointestinal: As noted in HPI  Genitourinary: No complaints of dysuria, no incontinence, no hesitancy, no nocturia     Musculoskeletal: No complaints of arthralgia, no myalgias, no joint swelling or stiffness, no limb pain or swelling  Neurological: No complaints of headache, no confusion, no convulsions, no numbness or tingling, no dizziness or fainting, no limb weakness, no difficulty walking  Skin: No complaints of skin rash or skin lesions, no itching, no skin wound, no dry skin  Hematological/Lymphatic: No complaints of swollen glands, does not bleed easy  Allergic/Immunologic: No immunocompromised state  Endocrine:  No complaints of polyuria, no polydipsia  Psychiatric/Behavioral: is not suicidal, no sleep disturbances, no anxiety or depression, no change in personality, no emotional problems  Historical Information   Past Medical History:   Diagnosis Date    Abnormal Pap smear of cervix     in the past     Carpal tunnel syndrome     Right    Functional heart murmur in      GERD (gastroesophageal reflux disease)     no meds    History of paroxysmal supraventricular tachycardia     History of urinary tract infection     recurrent in past     Hyperlipidemia     Miscarriage     Uterine leiomyoma     had hysterectomy    Wears contact lenses     Wears glasses      Past Surgical History:   Procedure Laterality Date    HERNIA REPAIR      pt was 5months old    LASER ABLATION N/A     vaginal lesion(s)    MOLE EXCISION      [place] ANKLE SCOPE,PLANTAR FASCIOTOMY Right 2017    Procedure: RELEASE FASCIA PLANTAR/FASCIOTOMY ENDOSCOPIC (EPF);   Surgeon: Bianca Moreno DPM;  [facility];  Service: Podiatry    NC CYSTOURETHROSCOPY N/A 2016    Procedure: CYSTOSCOPY;  Surgeon: Danielle Patino DO;  [facility];  Service: Gynecology    NC LAPAROSCOPY W TOT HYSTERECTUTERUS <=250 GRAM  W TUBE/OVARY N/A 2016    Procedure: TOTAL LAPAROSCOPIC HYSTERECTOMY ;  Surgeon: Danielle Patino DO;  [facility];  Service: Gynecology    NC PART EXCIS PLANTAR FASCIA Left 2019    Procedure: LEFT  INSTEP PLANTAR FASCIOTOMY;  Surgeon: Jose Carrion DPM;  Location: [address] [place] [address] MAIN OR;  Service: Podiatry    [place] WRIST [place] LIG Right 1/29/2016    Procedure: ENDOSCOPIC CARPAL TUNNEL RELEASE;  Surgeon: Breana Lee MD;  [facility];  Service: Orthopedics    SALPINGECTOMY Bilateral 1/12/2016    Procedure: REMOVAL OF BOTH FALLOPIAN TUBES;  Surgeon: Sherry Paulson DO;  [facility];  Service:     TUBAL LIGATION       Social History   Social History     Substance and Sexual Activity   Alcohol Use Yes    Alcohol/week: 2 0 standard drinks    Types: 2 Glasses of wine per week    Comment: occasional alcohol use     Social History     Substance and Sexual Activity   Drug Use Never     Social History     Tobacco Use   Smoking Status Never Smoker   Smokeless Tobacco Never Used     Family History   Problem Relation Age of Onset    Diabetes Mother     Uterine cancer Mother     Obesity Mother     COPD Father     Hypothyroidism Sister     Atrial fibrillation Brother     Scleroderma Brother     Heart failure Maternal Grandmother     Heart failure Maternal Grandfather     Heart attack Paternal Grandmother     No Known Problems Sister     No Known Problems Daughter     No Known Problems Son     No Known Problems Paternal Grandfather     No Known Problems Daughter          Current Medications: has a current medication list which includes the following prescription(s): atorvastatin, conjugated estrogens, cranberry/probiotic/vit c, estradiol, metoprolol succinate, progesterone, and gabapentin  Vital Signs: /80   Pulse 68   Wt 72 1 kg (159 lb)   BMI 29 08 kg/m²     Physical Exam:   Constitutional  General Appearance: No acute distress, well appearing and well nourished  Head  Normocephalic  Eyes  Conjunctivae and lids: No swelling, erythema, or discharge  Pupils and irises: Equal, round and reactive to light     Ears, Nose, Mouth, and Throat  External inspection of ears and nose: Normal  Nasal mucosa, septum and turbinates: Normal without edema or erythema/   Oropharynx: Normal with no erythema, edema, exudate or lesions  Neck  Normal range of motion  Neck supple  Cardiovascular  Auscultation of the heart: Normal rate and rhythm, normal S1 and S2 without murmurs  Examination of the extremities for edema and/or varicosities: Normal  Pulmonary/Chest  Respiratory effort: No increased work of breathing or signs of respiratory distress  Auscultation of lungs: Clear to auscultation, equal breath sounds bilaterally, no wheezes, rales, no rhonchi  Abdomen  Abdomen: Non-tender, no masses  Liver and spleen: No hepatomegaly or splenomegaly  Musculoskeletal  Gait and station: normal   Digits and Nails: normal without clubbing or cyanosis  Inspection/palpation of joints, bones, and muscles: Normal  Neurological  No nystagmus or asterixis  Skin  Skin and subcutaneous tissue: Normal without rashes or lesions  Lymphatic  Palpation of the lymph nodes in neck: No lymphadenopathy     Psychiatric  Orientation to person, place and time: Normal   Mood and affect: Normal          Labs:   Lab Results   Component Value Date    ALT 28 08/27/2019    AST 18 08/27/2019    BUN 14 08/27/2019    CALCIUM 8 8 08/27/2019     08/27/2019    CHOL 212 06/30/2015    CO2 27 08/27/2019    CREATININE 0 94 08/27/2019    HDL 62 (H) 08/27/2019    HCT 40 4 08/27/2019    HGB 13 3 08/27/2019    HGBA1C 5 3 08/07/2018     08/27/2019    K 4 3 08/27/2019     03/19/2015    TRIG 186 (H) 08/27/2019    WBC 6 45 08/27/2019         X-Rays & Procedures:   No orders to display         ______________________________________________________________________      Assessment & Plan:      Diagnoses and all orders for this visit:    Pharyngoesophageal dysphagia    Gastroesophageal reflux disease without esophagitis  -     Ambulatory referral to Gastroenterology    Screening for malignant neoplasm of colon  -     Ambulatory referral to Gastroenterology          I have taken liberty of scheduling the patient for both EGD and colonoscopy  She will continue Prilosec  I Will be happy to you of her results and any further recommendations  I  Would like to thank you for allowing me to participate in care            With warmest regards,    Panchito Basilio MD, [facility]

## 2019-10-15 NOTE — PROGRESS NOTES
[facility] [facility]    Dear Alfonso Hayden,     I had the pleasure of seeing your patient Margery Krabbe in the office today and I thank you for this kind referral        Chief Complaint:  Chronic reflux      HPI:  Margery Krabbe is a 48 y o  female who presents with 2 separate GI issues  The 1st is a history of chronic reflux  According to the patient she has reflux going back years  Recently was getting worse so 3 months ago she was placed on Prilosec  According to the patient this is helped significantly  She does have occasional dysphagia for solids but liquids  Patient denies any weight loss  No chest pain  No melena hematochezia  She has no change in the consistency of her skin  She denies any Raynaud's  There is no nocturnal symptomatology  No exertional symptomatology  There is no other particular exacerbating or remitting factor for reflux  She has never had an EGD  Patient denies any pain change in bowel, change in stool caliber melanotic stool, hematochezia, rectal bleeding, tenesmus, weight loss  She has never a colonoscopy and requires an initial average risk screening  No family history cancer  Patient has no other complaints at the present time         Review of Systems:   Constitutional: No fever or chills, feels well, no tiredness, no recent weight gain or weight loss  HENT: No complaints of earache, no hearing loss, no nosebleeds, no nasal discharge, no sore throat, no hoarseness  Eyes: No complaints of eye pain, no red eyes, no discharge from eyes, no itchy eyes  Cardiovascular: No complaints of slow heart rate, no fast heart rate, no chest pain, no palpitations, no leg claudication, no lower extremity edema  Respiratory: No complaints of shortness of breath, no wheezing, no cough, no SOB on exertion, no orthopnea  Gastrointestinal: As noted in HPI  Genitourinary: No complaints of dysuria, no incontinence, no hesitancy, no nocturia     Musculoskeletal: No complaints of arthralgia, no myalgias, no joint swelling or stiffness, no limb pain or swelling  Neurological: No complaints of headache, no confusion, no convulsions, no numbness or tingling, no dizziness or fainting, no limb weakness, no difficulty walking  Skin: No complaints of skin rash or skin lesions, no itching, no skin wound, no dry skin  Hematological/Lymphatic: No complaints of swollen glands, does not bleed easy  Allergic/Immunologic: No immunocompromised state  Endocrine:  No complaints of polyuria, no polydipsia  Psychiatric/Behavioral: is not suicidal, no sleep disturbances, no anxiety or depression, no change in personality, no emotional problems  Historical Information   Past Medical History:   Diagnosis Date    Abnormal Pap smear of cervix     in the past     Carpal tunnel syndrome     Right    Functional heart murmur in      GERD (gastroesophageal reflux disease)     no meds    History of paroxysmal supraventricular tachycardia     History of urinary tract infection     recurrent in past     Hyperlipidemia     Miscarriage     Uterine leiomyoma     had hysterectomy    Wears contact lenses     Wears glasses      Past Surgical History:   Procedure Laterality Date    HERNIA REPAIR      pt was 5months old    LASER ABLATION N/A     vaginal lesion(s)    MOLE EXCISION      [place] ANKLE SCOPE,PLANTAR FASCIOTOMY Right 2017    Procedure: RELEASE FASCIA PLANTAR/FASCIOTOMY ENDOSCOPIC (EPF);   Surgeon: Darrick Lynne DPM;  [facility];  Service: Podiatry    [place] CYSTOURETHROSCOPY N/A 2016    Procedure: CYSTOSCOPY;  Surgeon: Dinesh Canela DO;  [facility];  Service: Gynecology    [place] LAPAROSCOPY W TOT HYSTERECTUTERUS <=250 GRAM  W TUBE/OVARY N/A 2016    Procedure: TOTAL LAPAROSCOPIC HYSTERECTOMY ;  Surgeon: Dinesh Canela DO;  [facility];  Service: Gynecology    [place] PART EXCIS PLANTAR FASCIA Left 2019    Procedure: LEFT  INSTEP PLANTAR FASCIOTOMY;  Surgeon: Enma Nicolas DPM;  Location: [address] [place] [address] MAIN OR;  Service: Podiatry    [place] WRIST [place]  LIG Right 1/29/2016    Procedure: ENDOSCOPIC CARPAL TUNNEL RELEASE;  Surgeon: Emma Hollis MD;  [facility];  Service: Orthopedics    SALPINGECTOMY Bilateral 1/12/2016    Procedure: REMOVAL OF BOTH FALLOPIAN TUBES;  Surgeon: Arlin Riedel, DO;  [facility];  Service:     TUBAL LIGATION       Social History   Social History     Substance and Sexual Activity   Alcohol Use Yes    Alcohol/week: 2 0 standard drinks    Types: 2 Glasses of wine per week    Comment: occasional alcohol use     Social History     Substance and Sexual Activity   Drug Use Never     Social History     Tobacco Use   Smoking Status Never Smoker   Smokeless Tobacco Never Used     Family History   Problem Relation Age of Onset    Diabetes Mother     Uterine cancer Mother     Obesity Mother     COPD Father     Hypothyroidism Sister     Atrial fibrillation Brother     Scleroderma Brother     Heart failure Maternal Grandmother     Heart failure Maternal Grandfather     Heart attack Paternal Grandmother     No Known Problems Sister     No Known Problems Daughter     No Known Problems Son     No Known Problems Paternal Grandfather     No Known Problems Daughter          Current Medications: has a current medication list which includes the following prescription(s): atorvastatin, conjugated estrogens, cranberry/probiotic/vit c, estradiol, metoprolol succinate, progesterone, and gabapentin  Vital Signs: /80   Pulse 68   Wt 72 1 kg (159 lb)   BMI 29 08 kg/m²     Physical Exam:   Constitutional  General Appearance: No acute distress, well appearing and well nourished  Head  Normocephalic  Eyes  Conjunctivae and lids: No swelling, erythema, or discharge  Pupils and irises: Equal, round and reactive to light     Ears, Nose, Mouth, and Throat  External inspection of ears and nose: Normal  Nasal mucosa, septum and turbinates: Normal without edema or erythema/   Oropharynx: Normal with no erythema, edema, exudate or lesions  Neck  Normal range of motion  Neck supple  Cardiovascular  Auscultation of the heart: Normal rate and rhythm, normal S1 and S2 without murmurs  Examination of the extremities for edema and/or varicosities: Normal  Pulmonary/Chest  Respiratory effort: No increased work of breathing or signs of respiratory distress  Auscultation of lungs: Clear to auscultation, equal breath sounds bilaterally, no wheezes, rales, no rhonchi  Abdomen  Abdomen: Non-tender, no masses  Liver and spleen: No hepatomegaly or splenomegaly  Musculoskeletal  Gait and station: normal   Digits and Nails: normal without clubbing or cyanosis  Inspection/palpation of joints, bones, and muscles: Normal  Neurological  No nystagmus or asterixis  Skin  Skin and subcutaneous tissue: Normal without rashes or lesions  Lymphatic  Palpation of the lymph nodes in neck: No lymphadenopathy     Psychiatric  Orientation to person, place and time: Normal   Mood and affect: Normal          Labs:   Lab Results   Component Value Date    ALT 28 08/27/2019    AST 18 08/27/2019    BUN 14 08/27/2019    CALCIUM 8 8 08/27/2019     08/27/2019    CHOL 212 06/30/2015    CO2 27 08/27/2019    CREATININE 0 94 08/27/2019    HDL 62 (H) 08/27/2019    HCT 40 4 08/27/2019    HGB 13 3 08/27/2019    HGBA1C 5 3 08/07/2018     08/27/2019    K 4 3 08/27/2019     03/19/2015    TRIG 186 (H) 08/27/2019    WBC 6 45 08/27/2019         X-Rays & Procedures:   No orders to display         ______________________________________________________________________      Assessment & Plan:      Diagnoses and all orders for this visit:    Pharyngoesophageal dysphagia    Gastroesophageal reflux disease without esophagitis  -     Ambulatory referral to Gastroenterology    Screening for malignant neoplasm of colon  -     Ambulatory referral to Gastroenterology          I have taken liberty of scheduling the patient for both EGD and colonoscopy  She will continue Prilosec  I Will be happy to you of her results and any further recommendations  I  Would like to thank you for allowing me to participate in care            With warmest regards,    Celso Noble MD, [facility]

## 2019-10-17 ENCOUNTER — HOSPITAL ENCOUNTER (OUTPATIENT)
Dept: RADIOLOGY | Facility: MEDICAL CENTER | Age: 54
Discharge: HOME | End: 2019-10-17
Payer: COMMERCIAL

## 2019-10-17 DIAGNOSIS — Z13.820 SCREENING FOR OSTEOPOROSIS: ICD-10-CM

## 2019-10-17 PROCEDURE — 77080 DXA BONE DENSITY AXIAL: CPT

## 2019-10-22 ENCOUNTER — OFFICE VISIT (OUTPATIENT)
Dept: PODIATRY | Facility: CLINIC | Age: 54
End: 2019-10-22
Payer: COMMERCIAL

## 2019-10-22 VITALS
HEART RATE: 84 BPM | SYSTOLIC BLOOD PRESSURE: 113 MMHG | BODY MASS INDEX: 29.4 KG/M2 | HEIGHT: 62 IN | DIASTOLIC BLOOD PRESSURE: 68 MMHG | WEIGHT: 159.8 LBS

## 2019-10-22 DIAGNOSIS — M79.672 LEFT FOOT PAIN: ICD-10-CM

## 2019-10-22 DIAGNOSIS — G62.9 PERIPHERAL NERVE DISORDER: Primary | ICD-10-CM

## 2019-10-22 PROCEDURE — 99213 OFFICE O/P EST LOW 20 MIN: CPT | Performed by: PODIATRIST

## 2019-10-22 RX ORDER — MELOXICAM 15 MG/1
15 TABLET ORAL DAILY
Qty: 30 TABLET | Refills: 2 | Status: SHIPPED | OUTPATIENT
Start: 2019-10-22 | End: 2020-03-06

## 2019-10-22 NOTE — PROGRESS NOTES
Assessment/Plan:    As patient is feeling much better, she was told to continue with the meloxicam until pain is gone and appropriate prescription was given to her  She may discontinue gabapentin  Due to multiple areas of pain in her body, she was advised to seek the care of a rheumatologist     No problem-specific Assessment & Plan notes found for this encounter  Diagnoses and all orders for this visit:    Peripheral nerve disorder    Left foot pain          Subjective:      Patient ID: Socorro Rivera is a 48 y o  female  HPI     Patient presents for assessment of left foot  That was felt the patient had neurapraxia from a surgical procedure at last visit and she was placed on gabapentin  Patient notes that she no longer has numbness in her left foot and she only feels a tightness in the instep  She states that her discomfort is a 4/10  She also notes that she took meloxicam that she had at home and she felt significant improvement with this medication  On questioning, patient notes that she gets intermittent joint pain in multiple areas of her body  This includes her fingers, knees and shoulders  Meloxicam is typically helpful  She also gets a facial rash at times  The following portions of the patient's history were reviewed and updated as appropriate: allergies, current medications, past family history, past medical history, past social history, past surgical history and problem list     Review of Systems   Gastrointestinal: Negative  Musculoskeletal: Positive for arthralgias  Hematological: Negative  Objective:      /68   Pulse 84   Ht 5' 2" (1 575 m)   Wt 72 5 kg (159 lb 12 8 oz)   BMI 29 23 kg/m²          Physical Exam   Constitutional: She is oriented to person, place, and time  Cardiovascular: Regular rhythm and intact distal pulses  Musculoskeletal: She exhibits no deformity  Neurological: She is alert and oriented to person, place, and time   No sensory deficit  She exhibits normal muscle tone  Skin: Skin is warm

## 2019-10-28 ENCOUNTER — TELEPHONE (OUTPATIENT)
Dept: GASTROENTEROLOGY | Facility: CLINIC | Age: 54
End: 2019-10-28

## 2019-11-02 ENCOUNTER — ANESTHESIA (OUTPATIENT)
Dept: GASTROENTEROLOGY | Facility: HOSPITAL | Age: 54
End: 2019-11-02

## 2019-11-02 ENCOUNTER — ANESTHESIA EVENT (OUTPATIENT)
Dept: GASTROENTEROLOGY | Facility: HOSPITAL | Age: 54
End: 2019-11-02

## 2019-11-02 ENCOUNTER — HOSPITAL ENCOUNTER (OUTPATIENT)
Dept: GASTROENTEROLOGY | Facility: HOSPITAL | Age: 54
Setting detail: OUTPATIENT SURGERY
Discharge: HOME | End: 2019-11-02
Attending: INTERNAL MEDICINE | Admitting: INTERNAL MEDICINE
Payer: COMMERCIAL

## 2019-11-02 VITALS
HEIGHT: 62 IN | WEIGHT: 154.98 LBS | RESPIRATION RATE: 18 BRPM | HEART RATE: 75 BPM | OXYGEN SATURATION: 99 % | TEMPERATURE: 97.6 F | SYSTOLIC BLOOD PRESSURE: 125 MMHG | BODY MASS INDEX: 28.52 KG/M2 | DIASTOLIC BLOOD PRESSURE: 75 MMHG

## 2019-11-02 DIAGNOSIS — K21.9 GASTROESOPHAGEAL REFLUX DISEASE WITHOUT ESOPHAGITIS: ICD-10-CM

## 2019-11-02 DIAGNOSIS — Z12.11 SCREENING FOR MALIGNANT NEOPLASM OF COLON: ICD-10-CM

## 2019-11-02 DIAGNOSIS — R13.14 PHARYNGOESOPHAGEAL DYSPHAGIA: ICD-10-CM

## 2019-11-02 PROCEDURE — NC001 PR NO CHARGE: Performed by: INTERNAL MEDICINE

## 2019-11-02 PROCEDURE — 43239 EGD BIOPSY SINGLE/MULTIPLE: CPT | Performed by: INTERNAL MEDICINE

## 2019-11-02 PROCEDURE — 43248 EGD GUIDE WIRE INSERTION: CPT | Performed by: INTERNAL MEDICINE

## 2019-11-02 PROCEDURE — 45385 COLONOSCOPY W/LESION REMOVAL: CPT | Performed by: INTERNAL MEDICINE

## 2019-11-02 PROCEDURE — 88305 TISSUE EXAM BY PATHOLOGIST: CPT | Performed by: PATHOLOGY

## 2019-11-02 RX ORDER — METOCLOPRAMIDE HYDROCHLORIDE 5 MG/ML
10 INJECTION INTRAMUSCULAR; INTRAVENOUS ONCE AS NEEDED
Status: CANCELLED | OUTPATIENT
Start: 2019-11-02

## 2019-11-02 RX ORDER — ONDANSETRON 2 MG/ML
4 INJECTION INTRAMUSCULAR; INTRAVENOUS ONCE AS NEEDED
Status: CANCELLED | OUTPATIENT
Start: 2019-11-02

## 2019-11-02 RX ORDER — DIPHENHYDRAMINE HYDROCHLORIDE 50 MG/ML
12.5 INJECTION INTRAMUSCULAR; INTRAVENOUS ONCE AS NEEDED
Status: CANCELLED | OUTPATIENT
Start: 2019-11-02

## 2019-11-02 RX ORDER — LIDOCAINE HYDROCHLORIDE 10 MG/ML
INJECTION, SOLUTION EPIDURAL; INFILTRATION; INTRACAUDAL; PERINEURAL AS NEEDED
Status: DISCONTINUED | OUTPATIENT
Start: 2019-11-02 | End: 2019-11-02 | Stop reason: SURG

## 2019-11-02 RX ORDER — SODIUM CHLORIDE, SODIUM LACTATE, POTASSIUM CHLORIDE, CALCIUM CHLORIDE 600; 310; 30; 20 MG/100ML; MG/100ML; MG/100ML; MG/100ML
75 INJECTION, SOLUTION INTRAVENOUS CONTINUOUS
Status: DISCONTINUED | OUTPATIENT
Start: 2019-11-02 | End: 2019-11-02

## 2019-11-02 RX ORDER — PROPOFOL 10 MG/ML
INJECTION, EMULSION INTRAVENOUS AS NEEDED
Status: DISCONTINUED | OUTPATIENT
Start: 2019-11-02 | End: 2019-11-02 | Stop reason: SURG

## 2019-11-02 RX ORDER — LIDOCAINE HYDROCHLORIDE 10 MG/ML
0.5 INJECTION, SOLUTION EPIDURAL; INFILTRATION; INTRACAUDAL; PERINEURAL ONCE AS NEEDED
Status: DISCONTINUED | OUTPATIENT
Start: 2019-11-02 | End: 2019-11-06 | Stop reason: HOSPADM

## 2019-11-02 RX ADMIN — PROPOFOL 10 MG: 10 INJECTION, EMULSION INTRAVENOUS at 10:13

## 2019-11-02 RX ADMIN — LIDOCAINE HYDROCHLORIDE 50 MG: 10 INJECTION, SOLUTION EPIDURAL; INFILTRATION; INTRACAUDAL; PERINEURAL at 09:58

## 2019-11-02 RX ADMIN — PROPOFOL 30 MG: 10 INJECTION, EMULSION INTRAVENOUS at 10:08

## 2019-11-02 RX ADMIN — PROPOFOL 20 MG: 10 INJECTION, EMULSION INTRAVENOUS at 10:09

## 2019-11-02 RX ADMIN — SODIUM CHLORIDE, SODIUM LACTATE, POTASSIUM CHLORIDE, AND CALCIUM CHLORIDE 75 ML/HR: .6; .31; .03; .02 INJECTION, SOLUTION INTRAVENOUS at 09:40

## 2019-11-02 RX ADMIN — PROPOFOL 50 MG: 10 INJECTION, EMULSION INTRAVENOUS at 10:05

## 2019-11-02 RX ADMIN — PROPOFOL 120 MG: 10 INJECTION, EMULSION INTRAVENOUS at 09:58

## 2019-11-02 RX ADMIN — PROPOFOL 30 MG: 10 INJECTION, EMULSION INTRAVENOUS at 10:01

## 2019-11-02 RX ADMIN — PROPOFOL 50 MG: 10 INJECTION, EMULSION INTRAVENOUS at 10:03

## 2019-11-02 NOTE — ANESTHESIA PREPROCEDURE EVALUATION
Review of Systems/Medical History  Patient summary reviewed  Chart reviewed  No history of anesthetic complications     Cardiovascular  Hyperlipidemia, Dysrhythmias , history of PSVT,    Pulmonary  Negative pulmonary ROS        GI/Hepatic    GERD , Bowel prep       Negative  ROS        Endo/Other  Negative endo/other ROS      GYN    Hysterectomy,        Hematology  Negative hematology ROS      Musculoskeletal  Negative musculoskeletal ROS        Neurology      Comment: Right CTS Psychology   Negative psychology ROS              Physical Exam    Airway    Mallampati score: II  TM Distance: >3 FB  Neck ROM: full     Dental   No notable dental hx     Cardiovascular  Rhythm: regular, Rate: normal, Cardiovascular exam normal    Pulmonary  Pulmonary exam normal Breath sounds clear to auscultation,     Other Findings        Anesthesia Plan  ASA Score- 2     Anesthesia Type- IV sedation with anesthesia with ASA Monitors  Additional Monitors:   Airway Plan:         Plan Factors-    Induction- intravenous  Postoperative Plan-     Informed Consent- Anesthetic plan and risks discussed with patient  I personally reviewed this patient with the CRNA  Discussed and agreed on the Anesthesia Plan with the CRNA  Mykel Mendez Recent labs personally reviewed:  Lab Results   Component Value Date    WBC 6 45 08/27/2019    HGB 13 3 08/27/2019     08/27/2019     Lab Results   Component Value Date     03/19/2015    K 4 3 08/27/2019    BUN 14 08/27/2019    CREATININE 0 94 08/27/2019    GLUCOSE 83 03/19/2015     No results found for: PTT   Lab Results   Component Value Date    INR 0 93 03/22/2019       Blood type A    Lab Results   Component Value Date    HGBA1C 5 3 08/07/2018       I, Marivel Jensen MD, have personally seen and evaluated the patient prior to anesthetic care  I have reviewed the pre-anesthetic record, and other medical records if appropriate to the anesthetic care    If a CRNA is involved in the case, I have reviewed the CRNA assessment, if present, and agree  Risks/benefits and alternatives discussed with patient including possible PONV, sore throat, and possibility of rare anesthetic and surgical emergencies

## 2019-11-02 NOTE — ANESTHESIA POSTPROCEDURE EVALUATION
Post-Op Assessment Note    CV Status:  Stable  Pain Score: 0    Pain management: adequate     Mental Status:  Awake   Hydration Status:  Stable   PONV Controlled:  None   Airway Patency:  Patent and adequate   Post Op Vitals Reviewed: Yes      Staff: CRNA           /71 (11/02/19 1020)    Temp 97 6 °F (36 4 °C) (11/02/19 1020)    Pulse 71 (11/02/19 1020)   Resp 17 (11/02/19 1020)    SpO2 96 % (11/02/19 1020)

## 2019-11-02 NOTE — INTERVAL H&P NOTE
H&P reviewed  After examining the patient I find no changes in the patients condition since the H&P had been written      Vitals:    11/02/19 0929   BP: 138/81   Pulse: 69   Resp: 15   Temp: (!) 96 9 °F (36 1 °C)   SpO2: 99%

## 2019-11-02 NOTE — DISCHARGE INSTRUCTIONS
Upper Endoscopy   WHAT YOU NEED TO KNOW:   An upper endoscopy is also called an upper gastrointestinal (GI) endoscopy, or an esophagogastroduodenoscopy (EGD)  You may feel bloated, gassy, or have some abdominal discomfort after your procedure  Your throat may be sore for 24 to 36 hours  You may burp or pass gas from air that is still inside your body  DISCHARGE INSTRUCTIONS:   Call 911 for any of the following:   · You have sudden chest pain or trouble breathing  Seek care immediately if:   · You feel dizzy or faint  · You have trouble swallowing  · Your bowel movements are very dark or black  · Your abdomen is hard and firm and you have severe pain  · You vomit blood  Contact your healthcare provider if:   · You feel full or bloated and cannot burp or pass gas  · You have not had a bowel movement for 3 days after your procedure  · You have neck pain  · You have a fever or chills  · You have nausea or are vomiting  · You have a rash or hives  · You have questions or concerns about your endoscopy  Relieve a sore throat:  Suck on throat lozenges or crushed ice  Gargle with a small amount of warm salt water  Mix 1 teaspoon of salt and 1 cup of warm water to make salt water  Relieve gas and discomfort from bloating:  Lie on your right side with a heating pad on your abdomen  Take short walks to help pass gas  Eat small meals until bloating is relieved  Rest after your procedure: You have been given medicine to relax you  Do not  drive or make important decisions until the day after your procedure  Return to your normal activity as directed  You can usually return to work the day after your procedure  Follow up with your healthcare provider as directed:  Write down your questions so you remember to ask them during your visits     © 2017 [address] is for End User's use only and may not be sold, redistributed or otherwise used for commercial purposes  All illustrations and images included in CareNotes® are the copyrighted property of A [employer]  or Darian Banuelos  The above information is an  only  It is not intended as medical advice for individual conditions or treatments  Talk to your doctor, nurse or pharmacist before following any medical regimen to see if it is safe and effective for you  Colonoscopy   WHAT YOU NEED TO KNOW:   A colonoscopy is a procedure to examine the inside of your colon (intestine) with a scope  Polyps or tissue growths may have been removed during your colonoscopy  It is normal to feel bloated and to have some abdominal discomfort  You should be passing gas  If you have hemorrhoids or you had polyps removed, you may have a small amount of bleeding  DISCHARGE INSTRUCTIONS:   Seek care immediately if:   · You have a large amount of bright red blood in your bowel movements  · Your abdomen is hard and firm and you have severe pain  · You have sudden trouble breathing  Contact your healthcare provider if:   · You develop a rash or hives  · You have a fever within 24 hours of your procedure       · You have not had a bowel movement for 3 days after your procedure  · You have questions or concerns about your condition or care  Activity:   · Do not lift, strain, or run  for 3 days after your procedure  · Rest after your procedure  You have been given medicine to relax you  Do not  drive or make important decisions until the day after your procedure  Return to your normal activity as directed  · Relieve gas and discomfort from bloating  by lying on your right side with a heating pad on your abdomen  You may need to take short walks to help the gas move out  Eat small meals until bloating is relieved  If you had polyps removed: For 7 days after your procedure:  · Do not  take aspirin  · Do not  go on long car rides    Follow up with your healthcare provider as directed: Write down your questions so you remember to ask them during your visits  © 2017 [address] is for End User's use only and may not be sold, redistributed or otherwise used for commercial purposes  All illustrations and images included in CareNotes® are the copyrighted property of A [employer]  or Darian Banuelos  The above information is an  only  It is not intended as medical advice for individual conditions or treatments  Talk to your doctor, nurse or pharmacist before following any medical regimen to see if it is safe and effective for you

## 2019-11-07 ENCOUNTER — HOSPITAL ENCOUNTER (OUTPATIENT)
Dept: RADIOLOGY | Facility: MEDICAL CENTER | Age: 54
Discharge: HOME | End: 2019-11-07
Payer: COMMERCIAL

## 2019-11-07 VITALS — BODY MASS INDEX: 28.34 KG/M2 | HEIGHT: 62 IN | WEIGHT: 154 LBS

## 2019-11-07 DIAGNOSIS — Z12.39 SCREENING FOR MALIGNANT NEOPLASM OF BREAST: ICD-10-CM

## 2019-11-07 PROCEDURE — 77067 SCR MAMMO BI INCL CAD: CPT

## 2019-11-07 PROCEDURE — 77063 BREAST TOMOSYNTHESIS BI: CPT

## 2019-11-14 ENCOUNTER — TELEPHONE (OUTPATIENT)
Dept: GASTROENTEROLOGY | Facility: CLINIC | Age: 54
End: 2019-11-14

## 2019-11-15 ENCOUNTER — TELEPHONE (OUTPATIENT)
Dept: GASTROENTEROLOGY | Facility: CLINIC | Age: 54
End: 2019-11-15

## 2019-11-21 ENCOUNTER — TELEPHONE (OUTPATIENT)
Dept: FAMILY MEDICINE CLINIC | Facility: MEDICAL CENTER | Age: 54
End: 2019-11-21

## 2019-11-21 NOTE — TELEPHONE ENCOUNTER
[facility] calls from her work, [employer], because of back pain and elevated BP at work of 160/98  The left mid section of her back is hurting since yesterday morning  No bladder or kidney symptoms  No previous injury  She did take 600 mg Ibuprofen this morning at 8 but discomfort has returned  She states that it is not severe pain, just moderate discomfort  Can walk about and still be at work  She does not have HTN, takes Metoprolol for her heart  No other symptoms, no headache or dizziness, feels fine other then back discomfort  She will check her pressure again and give us a call back  If BP is continuing to climb will see if Dr Francia Bautista can see her this evening and if not can place her in schedule for Dr Andrés Beuachamp tomorrow at 3:45  She has to attend a seminar for dementia tomorrow from 7:30am to 2:30pm for work in [facility]  Keep task open to see how she is doing on return call

## 2019-11-21 NOTE — TELEPHONE ENCOUNTER
Zahira Greenberg called back and her pressure has decreased to 155/90 and pulse rate has come down to 57 as well  Ibuprofen does help the back but she can still feel the discomfort  Given appt  Tomorrow

## 2019-11-22 ENCOUNTER — OFFICE VISIT (OUTPATIENT)
Dept: FAMILY MEDICINE CLINIC | Facility: MEDICAL CENTER | Age: 54
End: 2019-11-22
Payer: COMMERCIAL

## 2019-11-22 VITALS
OXYGEN SATURATION: 98 % | BODY MASS INDEX: 29.08 KG/M2 | DIASTOLIC BLOOD PRESSURE: 80 MMHG | HEART RATE: 69 BPM | SYSTOLIC BLOOD PRESSURE: 138 MMHG | WEIGHT: 159 LBS

## 2019-11-22 DIAGNOSIS — M54.6 LEFT-SIDED THORACIC BACK PAIN, UNSPECIFIED CHRONICITY: Primary | ICD-10-CM

## 2019-11-22 PROCEDURE — 1036F TOBACCO NON-USER: CPT | Performed by: FAMILY MEDICINE

## 2019-11-22 PROCEDURE — 99214 OFFICE O/P EST MOD 30 MIN: CPT | Performed by: FAMILY MEDICINE

## 2019-11-22 RX ORDER — CYCLOBENZAPRINE HCL 10 MG
10 TABLET ORAL
Qty: 30 TABLET | Refills: 1 | Status: SHIPPED | OUTPATIENT
Start: 2019-11-22 | End: 2020-03-06 | Stop reason: ALTCHOICE

## 2019-11-22 NOTE — PROGRESS NOTES
HTN  Patient was checking her blood pressures at work yesterday  They were elevated  However she was having some back pain and there was a general sense of anxiety and concern at the office where she works  Today her blood pressure is fine  Will continue to watch  GERD    Currently well treated with p r n  Medications only  It is working well for her  Back Pain    She has some muscle spasm muscle pain under her left shoulder blade  It was worse yesterday  She took some Flexeril that she had left over at home and it did help  She is also taking some anti-inflammatories  Past medical history, past surgical history, family medical history, social history, and medication list were all reviewed  Review of systems for GI  cardiac pulmonary and neurologic systems are all negative  ENT review of systems is also negative  /80   Pulse 69   Wt 72 1 kg (159 lb)   SpO2 98%   BMI 29 08 kg/m²     HEENT examination is within normal limits no acute findings  Neck was supple  Chest clear  Cardiac exam revealed a regular rate and rhythm without murmur rub or gallop  Abdomen is soft and nontender  See above for assessment and plan for the patient's individual problems on the diagnosis list       Will prescribe some more Flexeril, she will continue anti-inflammatories  Recheck if no improvement

## 2019-12-18 DIAGNOSIS — E78.2 MIXED HYPERLIPIDEMIA: ICD-10-CM

## 2019-12-18 RX ORDER — ATORVASTATIN CALCIUM 20 MG/1
20 TABLET, FILM COATED ORAL DAILY
Qty: 90 TABLET | Refills: 3 | Status: SHIPPED | OUTPATIENT
Start: 2019-12-18 | End: 2020-12-21

## 2020-02-17 DIAGNOSIS — I47.1 PSVT (PAROXYSMAL SUPRAVENTRICULAR TACHYCARDIA) (HCC): ICD-10-CM

## 2020-02-17 RX ORDER — METOPROLOL SUCCINATE 50 MG/1
50 TABLET, EXTENDED RELEASE ORAL DAILY
Qty: 90 TABLET | Refills: 3 | Status: SHIPPED | OUTPATIENT
Start: 2020-02-17 | End: 2020-02-18

## 2020-02-18 RX ORDER — METOPROLOL SUCCINATE 50 MG/1
TABLET, EXTENDED RELEASE ORAL
Qty: 90 TABLET | Refills: 0 | Status: SHIPPED | OUTPATIENT
Start: 2020-02-18 | End: 2020-12-01 | Stop reason: SDUPTHER

## 2020-03-06 ENCOUNTER — OFFICE VISIT (OUTPATIENT)
Dept: CARDIOLOGY CLINIC | Facility: CLINIC | Age: 55
End: 2020-03-06
Payer: COMMERCIAL

## 2020-03-06 VITALS
BODY MASS INDEX: 29.08 KG/M2 | HEART RATE: 68 BPM | SYSTOLIC BLOOD PRESSURE: 146 MMHG | DIASTOLIC BLOOD PRESSURE: 98 MMHG | WEIGHT: 158 LBS | OXYGEN SATURATION: 96 % | HEIGHT: 62 IN

## 2020-03-06 DIAGNOSIS — Z86.79 HISTORY OF PSVT (PAROXYSMAL SUPRAVENTRICULAR TACHYCARDIA): ICD-10-CM

## 2020-03-06 DIAGNOSIS — R00.2 INTERMITTENT PALPITATIONS: ICD-10-CM

## 2020-03-06 DIAGNOSIS — G62.9 PERIPHERAL NERVE DISORDER: ICD-10-CM

## 2020-03-06 DIAGNOSIS — I49.1 PREMATURE ATRIAL CONTRACTIONS: Primary | ICD-10-CM

## 2020-03-06 DIAGNOSIS — M79.672 LEFT FOOT PAIN: ICD-10-CM

## 2020-03-06 DIAGNOSIS — K21.9 GASTROESOPHAGEAL REFLUX DISEASE, ESOPHAGITIS PRESENCE NOT SPECIFIED: ICD-10-CM

## 2020-03-06 DIAGNOSIS — E66.3 OVERWEIGHT (BMI 25.0-29.9): ICD-10-CM

## 2020-03-06 DIAGNOSIS — E78.2 MIXED HYPERLIPIDEMIA: ICD-10-CM

## 2020-03-06 PROCEDURE — 3080F DIAST BP >= 90 MM HG: CPT | Performed by: INTERNAL MEDICINE

## 2020-03-06 PROCEDURE — 93000 ELECTROCARDIOGRAM COMPLETE: CPT | Performed by: INTERNAL MEDICINE

## 2020-03-06 PROCEDURE — 3077F SYST BP >= 140 MM HG: CPT | Performed by: INTERNAL MEDICINE

## 2020-03-06 PROCEDURE — 99214 OFFICE O/P EST MOD 30 MIN: CPT | Performed by: INTERNAL MEDICINE

## 2020-03-06 PROCEDURE — 1036F TOBACCO NON-USER: CPT | Performed by: INTERNAL MEDICINE

## 2020-03-06 PROCEDURE — 3008F BODY MASS INDEX DOCD: CPT | Performed by: INTERNAL MEDICINE

## 2020-03-06 RX ORDER — MELOXICAM 15 MG/1
15 TABLET ORAL
Qty: 30 TABLET | Refills: 2 | Status: SHIPPED | OUTPATIENT
Start: 2020-03-06 | End: 2020-09-30 | Stop reason: DRUGHIGH

## 2020-03-06 RX ORDER — OMEPRAZOLE 20 MG/1
20 TABLET, DELAYED RELEASE ORAL DAILY
Qty: 100 TABLET | Refills: 5
Start: 2020-03-06

## 2020-03-06 NOTE — PATIENT INSTRUCTIONS
1  For symptoms of joint aches and skin blotching when off meloxicam, suggest consultation with a rheumatologist   2  We updated your medication list today  3  Continue current medication  4  Recommend cardiology follow-up approximately six months with EKG, lipid panel, CMP

## 2020-03-06 NOTE — PROGRESS NOTES
Office Cardiology Progress Note  [place] 48 y o  female MRN: [id number]  03/06/20  4:07 PM      ASSESSMENT:    1  Asymptomatic atrial bigeminy/trigeminy as of today  2  Much improved palpitations overall, with last flare up 1+ years  ago, and history of AVNRT/PSVT diagnosed in 2005  3  Mixed dyslipidemia with residual increase in triglycerides and prior 1 1% 10 year and 38 8% lifetime ASCVD risks  4  History of chest pain with normal nuclear stress test on 9/29/16   5  32 8 pound weight gain since January, 2015 and  13 pound weight gain in approximately  1+ years  6   Moderate overweight  7   White coat hypertension of recent onset with no evidence of hypertension today  8   Resolved cotton wool exudates on recent eye examination and bilateral iritis, recently treated with steroid eyedrops  Plan       Patient Instructions     1  For symptoms of joint aches and skin blotching when off meloxicam, suggest consultation with a rheumatologist   2  We updated your medication list today  3  Continue current medication  4  Recommend cardiology follow-up approximately six months with EKG, lipid panel, CMP  HPI    This 48 y o  female  denies new cardiopulmonary and medical symptoms  The patient was diagnosed by her podiatrist as having chronic nerve damage causing her left foot tingling and pain with the patient currently on meloxicam 15 milligrams every other day with good control of the discomfort  She has found that if she with holds them locks a Cam for longer intervals then every other day, she experiences polyarthralgias and facial and chest skin blotching and asks me what might be the cause  The patient has only occasional mild palpitation, no sustained rapid heart action, chest pain, dyspnea, dizziness, syncope, edema    She currently exercises twice a day in the morning before going to work and when she gets home using a combination of stationary bicycle, treadmill, and video exercise  The patient is still working full-time as a  at 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  Premature atrial contractions Yes    Mixed hyperlipidemia     History of PSVT (paroxysmal supraventricular tachycardia)     Intermittent palpitations     Overweight (BMI 25 0-29  9)     Peripheral nerve disorder     Left foot pain     Gastroesophageal reflux disease, esophagitis presence not specified         Review of Systems    All other systems negative, except as noted in history of present illness    Historical Information   Past Medical History:   Diagnosis Date    Abnormal Pap smear of cervix     in the past     Carpal tunnel syndrome     Right    Functional heart murmur in      GERD (gastroesophageal reflux disease)     no meds    History of paroxysmal supraventricular tachycardia     History of urinary tract infection     recurrent in past     Hyperlipidemia     Miscarriage     Uterine leiomyoma     had hysterectomy    Wears contact lenses     Wears glasses      Past Surgical History:   Procedure Laterality Date    HERNIA REPAIR      pt was 5months old    HYSTERECTOMY      LASER ABLATION N/A     vaginal lesion(s)    MOLE EXCISION      OOPHORECTOMY      [place] ANKLE SCOPE,PLANTAR FASCIOTOMY Right 2017    Procedure: RELEASE FASCIA PLANTAR/FASCIOTOMY ENDOSCOPIC (EPF);   Surgeon: Arya Campbell DPM;  Location: [facility];  Service: Podiatry    [place] CYSTOURETHROSCOPY N/A 2016    Procedure: Jarvis Peace;  Surgeon: Blanca Otto DO;  [facility]; Service: Gynecology    CO LAPAROSCOPY W TOT HYSTERECTUTERUS <=250 M[address] TUBE/OVARY N/A 1/12/2016    Procedure: TOTAL LAPAROSCOPIC HYSTERECTOMY ;  Surgeon: Toshia Morales DO;  [facility];  Service: Gynecology    CO PART EXCIS PLANTAR FASCIA Left 4/8/2019    Procedure: LEFT  INSTEP PLANTAR FASCIOTOMY;  Surgeon: Lucas Aase, DPM;  Location: [address] [place] [address] MAIN OR;  Service: Podiatry    CO WRIST [place] LIG Right 1/29/2016    Procedure: ENDOSCOPIC CARPAL TUNNEL RELEASE;  Surgeon: Jimmy Castillo MD;  [facility];  Service: Orthopedics    SALPINGECTOMY Bilateral 1/12/2016    Procedure: REMOVAL OF BOTH FALLOPIAN TUBES;  Surgeon: Toshia Morales DO;  [facility];  Service:     TUBAL LIGATION       Social History     Substance and Sexual Activity   Alcohol Use Yes    Alcohol/week: 2 0 standard drinks    Types: 2 Glasses of wine per week    Frequency: 2-3 times a week    Drinks per session: 1 or 2    Comment: occasional alcohol use     Social History     Substance and Sexual Activity   Drug Use Never     Social History     Tobacco Use   Smoking Status Never Smoker   Smokeless Tobacco Never Used       Family History:  Family History   Problem Relation Age of Onset    Diabetes Mother     Uterine cancer Mother     Obesity Mother     COPD Father     Hypothyroidism Sister     Atrial fibrillation Brother     Scleroderma Brother     Heart failure Maternal Grandmother     Heart failure Maternal Grandfather     Heart attack Paternal Grandmother     No Known Problems Sister     No Known Problems Daughter     No Known Problems Son     No Known Problems Paternal Grandfather     No Known Problems Daughter          Meds/Allergies     Prior to Admission medications    Medication Sig Start Date End Date Taking?  Authorizing Provider   atorvastatin (LIPITOR) 20 mg tablet Take 1 tablet (20 mg total) by mouth daily 12/18/19  Yes Obi Magana MD   Calcium Carbonate-Vit D-Min (CALCIUM 1200 PO) Take by mouth   Yes Historical Provider, MD   Cholecalciferol (VITAMIN D3 PO) Take by mouth   Yes Historical Provider, MD   Cranberry-Vit C-Lactobacillus (CRANBERRY/PROBIOTIC/VIT C) 450-30 MG TABS Take 1 tablet by mouth daily   Yes Historical Provider, MD   estradiol (ESTRACE) 0 5 MG tablet Take 1 tablet (0 5 mg total) by mouth daily 9/23/19  Yes Darcy Álvarez DO   meloxicam (MOBIC) 15 mg tablet Take 1 tablet (15 mg total) by mouth daily 10/22/19 3/6/20 Yes Divya , DPM   metoprolol succinate (TOPROL-XL) 50 mg 24 hr tablet TAKE ONE TABLET BY MOUTH EVERY DAY 2/18/20  Yes Lisa Bruce MD   progesterone (PROMETRIUM) 100 MG capsule Take 1 capsule (100 mg total) by mouth daily at bedtime 9/23/19  Yes Darcy Álvarez DO   cyclobenzaprine (FLEXERIL) 10 mg tablet Take 1 tablet (10 mg total) by mouth daily at bedtime 11/22/19 3/6/20  Simón Darby MD       Allergies   Allergen Reactions    Levaquin [Levofloxacin] Anaphylaxis and Swelling     L         Vitals:    03/06/20 1528   BP: 146/98   BP Location: Left arm   Patient Position: Sitting   Cuff Size: Standard   Pulse: 68   SpO2: 96%   Weight: 71 7 kg (158 lb)   Height: 5' 2" (1 575 m)       Body mass index is 28 9 kg/m²  No change in weight compared to approximately 5-1/2 months ago  Physical Exam:    General Appearance:  Alert, cooperative, no distress, appears stated age and is mildly overweight     Head:  Normocephalic, without obvious abnormality, atraumatic   Eyes:  PERRL, conjunctiva/corneas clear, EOM's intact,   both eyes   Ears:  Normal TM's and external ear canals, both ears   Nose: Nares normal, septum midline, mucosa normal, no drainage or sinus tenderness   Throat: Lips, mucosa, and tongue normal; teeth and gums normal   Neck: Supple, symmetrical, trachea midline, no adenopathy, thyroid: not enlarged, symmetric, no tenderness/mass/nodules, no carotid bruit or JVD   Back:   Symmetric, no curvature, ROM normal, no CVA tenderness Lungs:   Clear to auscultation bilaterally, respirations unlabored   Chest Wall:  No tenderness or deformity   Heart:  Regular rate and rhythm, S1, S2 normal, no murmur, rub or gallop   Abdomen:   Soft, non-tender, bowel sounds active all four quadrants,  no masses, no organomegaly with mild obesity noted  Extremities: Extremities normal, atraumatic, no cyanosis or edema   Pulses: 2+ and symmetric   Skin: Skin showed normal color, texture, turgor and no rashes or lesions   Lymph nodes: Cervical, supraclavicular, and axillary nodes normal   Neurologic: Normal         Cardiographics    ECG 03/06/20:    Normal sinus rhythm  RV conduction delay  Minimal voltage criteria for LVH  Suppressed atrial bigeminy and trigeminy compared to 09/25/2019, with no other change      24 hour Holter monitor 10/02/2019:    Normal sinus rhythm throughout  Rare PVCs and PACs with no sustained ventricular or supraventricular dysrhythmias  No symptom rhythm correlation noted      IMAGING:    No Chest XR results available for this patient      DEXA scan 10/17/2019:    Osteopenia, based on left femoral neck          LAB REVIEW:      Lab Results   Component Value Date    SODIUM 136 08/27/2019    K 4 3 08/27/2019     08/27/2019    CO2 27 08/27/2019    ANIONGAP 8 03/19/2015    BUN 14 08/27/2019    CREATININE 0 94 08/27/2019    GLUCOSE 83 03/19/2015    GLUF 93 08/27/2019    CALCIUM 8 8 08/27/2019    AST 18 08/27/2019    ALT 28 08/27/2019    ALKPHOS 64 08/27/2019    PROT 7 7 03/19/2015    BILITOT 0 59 03/19/2015    EGFR 70 08/27/2019     Lab Results   Component Value Date    CHOLESTEROL 165 08/27/2019    CHOLESTEROL 154 08/07/2018    CHOLESTEROL 155 08/07/2018     Lab Results   Component Value Date    HDL 62 (H) 08/27/2019    HDL 62 (H) 08/07/2018    HDL 62 (H) 08/07/2018       Lab Results   Component Value Date    LDLCALC 66 08/27/2019    LDLCALC 66 08/07/2018    LDLCALC 67 08/07/2018       Lab Results   Component Value Date    TRIG 186 (H) 08/27/2019    TRIG 130 08/07/2018    TRIG 129 08/07/2018               Adolfo Hebert MD

## 2020-04-02 ENCOUNTER — CLINICAL SUPPORT (OUTPATIENT)
Dept: FAMILY MEDICINE CLINIC | Facility: MEDICAL CENTER | Age: 55
End: 2020-04-02
Payer: COMMERCIAL

## 2020-04-02 DIAGNOSIS — Z23 IMMUNIZATION DUE: Primary | ICD-10-CM

## 2020-04-02 PROCEDURE — 90471 IMMUNIZATION ADMIN: CPT

## 2020-04-02 PROCEDURE — 90750 HZV VACC RECOMBINANT IM: CPT

## 2020-05-11 ENCOUNTER — TELEPHONE (OUTPATIENT)
Dept: FAMILY MEDICINE CLINIC | Facility: MEDICAL CENTER | Age: 55
End: 2020-05-11

## 2020-05-11 ENCOUNTER — TELEMEDICINE (OUTPATIENT)
Dept: FAMILY MEDICINE CLINIC | Facility: MEDICAL CENTER | Age: 55
End: 2020-05-11
Payer: COMMERCIAL

## 2020-05-11 VITALS — WEIGHT: 154 LBS | BODY MASS INDEX: 28.34 KG/M2 | TEMPERATURE: 98.6 F | HEIGHT: 62 IN

## 2020-05-11 DIAGNOSIS — Z20.828 EXPOSURE TO SARS-ASSOCIATED CORONAVIRUS: ICD-10-CM

## 2020-05-11 DIAGNOSIS — J30.1 CHRONIC SEASONAL ALLERGIC RHINITIS DUE TO POLLEN: Primary | ICD-10-CM

## 2020-05-11 PROCEDURE — U0003 INFECTIOUS AGENT DETECTION BY NUCLEIC ACID (DNA OR RNA); SEVERE ACUTE RESPIRATORY SYNDROME CORONAVIRUS 2 (SARS-COV-2) (CORONAVIRUS DISEASE [COVID-19]), AMPLIFIED PROBE TECHNIQUE, MAKING USE OF HIGH THROUGHPUT TECHNOLOGIES AS DESCRIBED BY CMS-2020-01-R: HCPCS

## 2020-05-11 PROCEDURE — 99214 OFFICE O/P EST MOD 30 MIN: CPT | Performed by: FAMILY MEDICINE

## 2020-05-11 RX ORDER — FLUTICASONE PROPIONATE 50 MCG
2 SPRAY, SUSPENSION (ML) NASAL DAILY
Qty: 1 BOTTLE | Refills: 3 | Status: SHIPPED | OUTPATIENT
Start: 2020-05-11 | End: 2020-09-01 | Stop reason: ALTCHOICE

## 2020-05-12 DIAGNOSIS — J30.1 CHRONIC SEASONAL ALLERGIC RHINITIS DUE TO POLLEN: Primary | ICD-10-CM

## 2020-05-12 LAB
INPATIENT: NORMAL
SARS-COV-2 RNA SPEC QL NAA+PROBE: NOT DETECTED

## 2020-05-12 RX ORDER — METHYLPREDNISOLONE 4 MG/1
TABLET ORAL
Qty: 21 EACH | Refills: 0 | Status: SHIPPED | OUTPATIENT
Start: 2020-05-12 | End: 2020-08-14

## 2020-08-13 ENCOUNTER — TELEPHONE (OUTPATIENT)
Dept: FAMILY MEDICINE CLINIC | Facility: MEDICAL CENTER | Age: 55
End: 2020-08-13

## 2020-08-13 NOTE — TELEPHONE ENCOUNTER
S/w patient   Pulled her back out last night  Taking ibuprofen 600mg  Had one flexeril left from Rx last year, saving it for tonight  Works tomorrow but can leave for an appt if advised   See Mo

## 2020-08-13 NOTE — TELEPHONE ENCOUNTER
Discussed with Dr Cathy Garza  Continue Ibuprofen, take the flexeril tonight  Moist heat  appt in the morning with Dr Any Kim    Pt aware

## 2020-08-13 NOTE — TELEPHONE ENCOUNTER
Pt called to ask if Dr Eboni Rojas had received the MyChart message she had sent  Verified that it is in her chart, but he is not usually in the office on a Thursday  She asked to have the message forwarded to one of the other doctors to address for her  Explained that Dr Ester Councilman was only in this morning and was gone, but would send it to Dr Naheed Mendieta for her consideration  Conversation: Non-Urgent Medical Question   (Oldest Message First)   Madelyn Randolph   to Lindsay Shi MD            8/13/20 11:04 AM   Hi Dr Eboni Rojas  I am having another back spasm, which I had about a year ago I think and was seen by Dr Ester Councilman  He prescribed me a medrol dose pack and cyclobenzaprine which really helped   I only have one pill left of the cyclobenzaprine and was wondering if you could send a refill to [facility]          Routed to Clinical - please bring to Dr Annetta Chapa attention

## 2020-08-14 ENCOUNTER — OFFICE VISIT (OUTPATIENT)
Dept: FAMILY MEDICINE CLINIC | Facility: MEDICAL CENTER | Age: 55
End: 2020-08-14
Payer: COMMERCIAL

## 2020-08-14 VITALS
HEIGHT: 62 IN | HEART RATE: 68 BPM | TEMPERATURE: 98 F | SYSTOLIC BLOOD PRESSURE: 144 MMHG | RESPIRATION RATE: 16 BRPM | WEIGHT: 154 LBS | BODY MASS INDEX: 28.34 KG/M2 | DIASTOLIC BLOOD PRESSURE: 88 MMHG

## 2020-08-14 DIAGNOSIS — S33.6XXA SPRAIN OF SACROILIAC LIGAMENT, INITIAL ENCOUNTER: Primary | ICD-10-CM

## 2020-08-14 PROCEDURE — 1036F TOBACCO NON-USER: CPT | Performed by: FAMILY MEDICINE

## 2020-08-14 PROCEDURE — 3077F SYST BP >= 140 MM HG: CPT | Performed by: FAMILY MEDICINE

## 2020-08-14 PROCEDURE — 3079F DIAST BP 80-89 MM HG: CPT | Performed by: FAMILY MEDICINE

## 2020-08-14 PROCEDURE — 99213 OFFICE O/P EST LOW 20 MIN: CPT | Performed by: FAMILY MEDICINE

## 2020-08-14 RX ORDER — METHYLPREDNISOLONE 4 MG/1
TABLET ORAL
Qty: 21 EACH | Refills: 0 | Status: SHIPPED | OUTPATIENT
Start: 2020-08-14 | End: 2020-09-01 | Stop reason: ALTCHOICE

## 2020-08-18 NOTE — PROGRESS NOTES
Patient has pain over both sacroiliac joints  She moves the wrong way recently  She has had a history of sacroiliac sprains and strains  She has no referral of the pain beyond the hips  /88 (BP Location: Left arm, Patient Position: Sitting, Cuff Size: Adult)   Pulse 68   Temp 98 °F (36 7 °C) (Tympanic)   Resp 16   Ht 5' 2" (1 575 m)   Wt 69 9 kg (154 lb)   BMI 28 17 kg/m²     HEENT examination is within normal limits no acute findings  Neck was supple  Chest clear  Cardiac exam revealed a regular rate and rhythm without murmur rub or gallop  Abdomen is soft and nontender  Straight leg raise is negative  Reflexes are normal    There is tenderness over the sacroiliac joints bilaterally    Course of Medrol Dosepak  Also suggested that she begin a good program of core strengthening

## 2020-09-01 ENCOUNTER — OFFICE VISIT (OUTPATIENT)
Dept: PODIATRY | Facility: CLINIC | Age: 55
End: 2020-09-01
Payer: COMMERCIAL

## 2020-09-01 ENCOUNTER — OFFICE VISIT (OUTPATIENT)
Dept: FAMILY MEDICINE CLINIC | Facility: MEDICAL CENTER | Age: 55
End: 2020-09-01
Payer: COMMERCIAL

## 2020-09-01 VITALS
HEIGHT: 62 IN | BODY MASS INDEX: 28.89 KG/M2 | DIASTOLIC BLOOD PRESSURE: 90 MMHG | HEART RATE: 72 BPM | SYSTOLIC BLOOD PRESSURE: 130 MMHG | WEIGHT: 157 LBS | TEMPERATURE: 97.4 F

## 2020-09-01 VITALS
SYSTOLIC BLOOD PRESSURE: 146 MMHG | BODY MASS INDEX: 28.72 KG/M2 | TEMPERATURE: 98.8 F | HEART RATE: 76 BPM | DIASTOLIC BLOOD PRESSURE: 84 MMHG | HEIGHT: 62 IN

## 2020-09-01 DIAGNOSIS — M79.672 LEFT FOOT PAIN: ICD-10-CM

## 2020-09-01 DIAGNOSIS — M25.50 ARTHRALGIA, UNSPECIFIED JOINT: Primary | ICD-10-CM

## 2020-09-01 DIAGNOSIS — E78.00 PURE HYPERCHOLESTEROLEMIA: ICD-10-CM

## 2020-09-01 DIAGNOSIS — Z86.79 HISTORY OF PAROXYSMAL SUPRAVENTRICULAR TACHYCARDIA: ICD-10-CM

## 2020-09-01 DIAGNOSIS — M65.872 OTHER SYNOVITIS AND TENOSYNOVITIS, LEFT ANKLE AND FOOT: Primary | ICD-10-CM

## 2020-09-01 DIAGNOSIS — K21.9 GASTROESOPHAGEAL REFLUX DISEASE WITHOUT ESOPHAGITIS: ICD-10-CM

## 2020-09-01 DIAGNOSIS — M85.859 OSTEOPENIA OF NECK OF FEMUR, UNSPECIFIED LATERALITY: ICD-10-CM

## 2020-09-01 PROCEDURE — 99396 PREV VISIT EST AGE 40-64: CPT | Performed by: FAMILY MEDICINE

## 2020-09-01 PROCEDURE — 99214 OFFICE O/P EST MOD 30 MIN: CPT | Performed by: FAMILY MEDICINE

## 2020-09-01 PROCEDURE — 99213 OFFICE O/P EST LOW 20 MIN: CPT | Performed by: PODIATRIST

## 2020-09-01 PROCEDURE — 20550 NJX 1 TENDON SHEATH/LIGAMENT: CPT | Performed by: PODIATRIST

## 2020-09-01 RX ORDER — LIDOCAINE HYDROCHLORIDE 10 MG/ML
1 INJECTION, SOLUTION EPIDURAL; INFILTRATION; INTRACAUDAL; PERINEURAL ONCE
Status: COMPLETED | OUTPATIENT
Start: 2020-09-01 | End: 2020-09-01

## 2020-09-01 RX ORDER — TRIAMCINOLONE ACETONIDE 40 MG/ML
20 INJECTION, SUSPENSION INTRA-ARTICULAR; INTRAMUSCULAR ONCE
Status: COMPLETED | OUTPATIENT
Start: 2020-09-01 | End: 2020-09-01

## 2020-09-01 RX ORDER — MELOXICAM 15 MG/1
15 TABLET ORAL DAILY
Qty: 30 TABLET | Refills: 0 | Status: SHIPPED | OUTPATIENT
Start: 2020-09-01 | End: 2020-12-14

## 2020-09-01 RX ADMIN — TRIAMCINOLONE ACETONIDE 20 MG: 40 INJECTION, SUSPENSION INTRA-ARTICULAR; INTRAMUSCULAR at 18:09

## 2020-09-01 RX ADMIN — LIDOCAINE HYDROCHLORIDE 1 ML: 10 INJECTION, SOLUTION EPIDURAL; INFILTRATION; INTRACAUDAL; PERINEURAL at 18:08

## 2020-09-01 NOTE — ASSESSMENT & PLAN NOTE
Patient is currently taking metoprolol and this prevent symptoms  Continue metoprolol, continue routine follow-up

## 2020-09-01 NOTE — PROGRESS NOTES
Assessment/Plan:    Gastroesophageal reflux disease without esophagitis  Taking PPI  Has seen GI    Had esophageal stricture was dilated  Doing well now  Continue PPI        History of paroxysmal supraventricular tachycardia  Patient is currently taking metoprolol and this prevent symptoms  Continue metoprolol, continue routine follow-up  Pure hypercholesterolemia  Patient currently takes atorvastatin 20 mg  She has had good response  Will check lipid profile, continue atorvastatin, continue low-fat diet  Arthralgia  Arthtralgias     concerned about scleroderma, fam hx  Osteopenia of neck of femur  Femoral neck T score -! No results found  She is taking Vit D/Ca, weight bearing exercise  Diagnoses and all orders for this visit:    Arthralgia, unspecified joint  -     C-reactive protein; Future  -     LAMONT Screen w/ Reflex to Titer/Pattern; Future    Gastroesophageal reflux disease without esophagitis    History of paroxysmal supraventricular tachycardia    Pure hypercholesterolemia    Osteopenia of neck of femur, unspecified laterality        Subjective:      Patient ID: Dora Lee is a 50 y o  female  , lives with boyfriend  3 adult children   at Neurology  UTD With mammogram, Dexa, CRC      The following portions of the patient's history were reviewed and updated as appropriate: allergies, current medications, past family history, past medical history, past social history, past surgical history and problem list     Review of Systems   Constitutional: Negative for activity change, appetite change, fatigue and fever  HENT: Negative for congestion, ear pain, hearing loss, nosebleeds, postnasal drip, rhinorrhea and trouble swallowing  Eyes: Negative for photophobia, pain, redness and visual disturbance  Respiratory: Negative for cough, chest tightness, shortness of breath and wheezing  Cardiovascular: Negative for chest pain and palpitations  Gastrointestinal: Negative for abdominal pain, blood in stool, constipation, diarrhea, nausea and vomiting  Endocrine: Negative for cold intolerance, heat intolerance, polydipsia, polyphagia and polyuria  Genitourinary: Negative for difficulty urinating, dyspareunia, dysuria, flank pain, frequency, menstrual problem, pelvic pain, urgency, vaginal bleeding and vaginal discharge  Musculoskeletal: Positive for arthralgias and back pain  Negative for gait problem, joint swelling and myalgias  Skin: Negative for rash and wound  Neurological: Negative for dizziness, tremors, seizures, syncope, speech difficulty, weakness, light-headedness and headaches  Psychiatric/Behavioral: Negative for agitation, decreased concentration, dysphoric mood, sleep disturbance and suicidal ideas  The patient is not nervous/anxious  Objective:      /90 (BP Location: Left arm, Patient Position: Sitting, Cuff Size: Adult)   Pulse 72   Temp (!) 97 4 °F (36 3 °C)   Ht 5' 2" (1 575 m)   Wt 71 2 kg (157 lb)   BMI 28 72 kg/m²          Physical Exam  Vitals signs and nursing note reviewed  Constitutional:       Appearance: Normal appearance  She is well-developed  HENT:      Head: Normocephalic  Right Ear: Hearing, ear canal and external ear normal       Left Ear: Hearing, tympanic membrane, ear canal and external ear normal       Nose: Nose normal  No mucosal edema or rhinorrhea  Mouth/Throat:      Pharynx: Uvula midline  No oropharyngeal exudate  Eyes:      General: Lids are normal       Conjunctiva/sclera: Conjunctivae normal       Pupils: Pupils are equal, round, and reactive to light  Neck:      Thyroid: No thyroid mass or thyromegaly  Vascular: No carotid bruit  Cardiovascular:      Rate and Rhythm: Normal rate and regular rhythm  Pulses: Normal pulses  Heart sounds: Normal heart sounds, S1 normal and S2 normal  No murmur  No gallop      Pulmonary:      Effort: Pulmonary effort is normal       Breath sounds: Normal breath sounds  No wheezing or rales  Abdominal:      General: Bowel sounds are normal       Palpations: Abdomen is soft  Tenderness: There is no abdominal tenderness  Hernia: No hernia is present  Musculoskeletal: Normal range of motion  Lymphadenopathy:      Cervical: No cervical adenopathy  Skin:     General: Skin is warm and dry  Findings: No rash  Neurological:      Mental Status: She is oriented to person, place, and time  Cranial Nerves: No cranial nerve deficit  Sensory: No sensory deficit  Coordination: Coordination normal    Psychiatric:         Speech: Speech normal          Behavior: Behavior normal  Behavior is cooperative  Thought Content:  Thought content normal          Judgment: Judgment normal

## 2020-09-01 NOTE — PROGRESS NOTES
Assessment/Plan:    Explained to patient that her symptoms are consistent with tendinitis of the long extensor tendon to the left hallux  Patient placed on meloxicam 15 mg daily  Injected left EHL tendon with 0 5 cc Kenalog 40 along with 1 cc 1% xylocaine  No problem-specific Assessment & Plan notes found for this encounter  Diagnoses and all orders for this visit:    Other synovitis and tenosynovitis, left ankle and foot  -     meloxicam (MOBIC) 15 mg tablet; Take 1 tablet (15 mg total) by mouth daily  -     lidocaine (PF) (XYLOCAINE-MPF) 1 % injection 1 mL  -     triamcinolone acetonide (KENALOG-40) 40 mg/mL injection 20 mg    Left foot pain          Subjective:      Patient ID: Shannon Rodas is a 50 y o  female  HPI     Patient presents with pain along the anterior aspect of the left ankle when walking  Patient has been in pain for approximately 2 months  It began when she started walking on a treadmill  She also relates that there is a snapping sound on occasion  No severe trauma related  The following portions of the patient's history were reviewed and updated as appropriate: allergies, current medications, past family history, past medical history, past social history, past surgical history and problem list     Review of Systems   Cardiovascular: Negative  Gastrointestinal: Negative  Musculoskeletal: Positive for arthralgias and back pain  Hematological: Negative  Psychiatric/Behavioral: Negative  Objective:      /84   Pulse 76   Temp 98 8 °F (37 1 °C) (Tympanic)   Ht 5' 2" (1 575 m) Comment: verbal  BMI 28 72 kg/m²          Physical Exam  Constitutional:       Appearance: Normal appearance  Cardiovascular:      Pulses: Normal pulses  Musculoskeletal:         General: Tenderness present  Comments: Pain with palpation anterior aspect left ankle along the course of the EHL tendon  No bruising or swelling noted    Slight discomfort with dorsiflexion of left hallux  Skin:     General: Skin is warm  Findings: No erythema  Neurological:      General: No focal deficit present  Mental Status: She is oriented to person, place, and time  Foot injection     Date/Time 9/1/2020 6:37 PM     Performed by  Conrad Young DPM     Authorized by Conrad Young DPM      Universal Protocol Consent: Verbal consent obtained  Risks and benefits: risks, benefits and alternatives were discussed  Consent given by: patient  Patient understanding: patient states understanding of the procedure being performed  Patient identity confirmed: hospital-assigned identification number        Site preparation: Isopropyl alcohol    Local anesthesia used: yes     Anesthesia   Local anesthesia used: yes  Local Anesthetic: lidocaine 1% without epinephrine     Procedure Details   Procedure Notes: Injected it EHL tendon left foot at level of ankle with 0 5 cc Kenalog 40 along with 1 cc 1% xylocaine

## 2020-09-03 ENCOUNTER — APPOINTMENT (OUTPATIENT)
Dept: LAB | Facility: MEDICAL CENTER | Age: 55
End: 2020-09-03
Payer: COMMERCIAL

## 2020-09-03 DIAGNOSIS — E78.2 MIXED HYPERLIPIDEMIA: ICD-10-CM

## 2020-09-03 DIAGNOSIS — M25.50 ARTHRALGIA, UNSPECIFIED JOINT: ICD-10-CM

## 2020-09-03 DIAGNOSIS — E66.3 OVERWEIGHT (BMI 25.0-29.9): ICD-10-CM

## 2020-09-03 LAB
ALBUMIN SERPL BCP-MCNC: 3.9 G/DL (ref 3.5–5)
ALP SERPL-CCNC: 47 U/L (ref 46–116)
ALT SERPL W P-5'-P-CCNC: 25 U/L (ref 12–78)
ANION GAP SERPL CALCULATED.3IONS-SCNC: 5 MMOL/L (ref 4–13)
AST SERPL W P-5'-P-CCNC: 16 U/L (ref 5–45)
BILIRUB SERPL-MCNC: 0.47 MG/DL (ref 0.2–1)
BUN SERPL-MCNC: 16 MG/DL (ref 5–25)
CALCIUM SERPL-MCNC: 9.6 MG/DL (ref 8.3–10.1)
CHLORIDE SERPL-SCNC: 109 MMOL/L (ref 100–108)
CHOLEST SERPL-MCNC: 178 MG/DL (ref 50–200)
CO2 SERPL-SCNC: 24 MMOL/L (ref 21–32)
CREAT SERPL-MCNC: 0.96 MG/DL (ref 0.6–1.3)
CRP SERPL QL: <3 MG/L
GFR SERPL CREATININE-BSD FRML MDRD: 68 ML/MIN/1.73SQ M
GLUCOSE P FAST SERPL-MCNC: 91 MG/DL (ref 65–99)
HDLC SERPL-MCNC: 69 MG/DL
LDLC SERPL CALC-MCNC: 84 MG/DL (ref 0–100)
NONHDLC SERPL-MCNC: 109 MG/DL
POTASSIUM SERPL-SCNC: 4.7 MMOL/L (ref 3.5–5.3)
PROT SERPL-MCNC: 7.4 G/DL (ref 6.4–8.2)
SODIUM SERPL-SCNC: 138 MMOL/L (ref 136–145)
TRIGL SERPL-MCNC: 124 MG/DL

## 2020-09-03 PROCEDURE — 80061 LIPID PANEL: CPT

## 2020-09-03 PROCEDURE — 80053 COMPREHEN METABOLIC PANEL: CPT

## 2020-09-03 PROCEDURE — 36415 COLL VENOUS BLD VENIPUNCTURE: CPT

## 2020-09-03 PROCEDURE — 86038 ANTINUCLEAR ANTIBODIES: CPT

## 2020-09-03 PROCEDURE — 86140 C-REACTIVE PROTEIN: CPT

## 2020-09-04 LAB — RYE IGE QN: NEGATIVE

## 2020-09-29 ENCOUNTER — OFFICE VISIT (OUTPATIENT)
Dept: PODIATRY | Facility: CLINIC | Age: 55
End: 2020-09-29
Payer: COMMERCIAL

## 2020-09-29 VITALS
BODY MASS INDEX: 28.34 KG/M2 | SYSTOLIC BLOOD PRESSURE: 142 MMHG | HEIGHT: 62 IN | HEART RATE: 74 BPM | DIASTOLIC BLOOD PRESSURE: 81 MMHG | TEMPERATURE: 99.2 F | WEIGHT: 154 LBS

## 2020-09-29 DIAGNOSIS — L30.9 ECZEMA, UNSPECIFIED TYPE: Primary | ICD-10-CM

## 2020-09-29 PROCEDURE — 99212 OFFICE O/P EST SF 10 MIN: CPT | Performed by: PODIATRIST

## 2020-09-29 RX ORDER — FLUOCINONIDE 0.5 MG/G
OINTMENT TOPICAL 2 TIMES DAILY
Qty: 30 G | Refills: 0 | Status: SHIPPED | OUTPATIENT
Start: 2020-09-29 | End: 2021-04-15 | Stop reason: ALTCHOICE

## 2020-09-29 NOTE — PROGRESS NOTES
Patient presents for assessment of left foot  Patient responded well to cortisone injection and meloxicam   No pain is reported in the left foot  Only complaint registered is intermittent itching on the dorsum of the left 3rd toe  There is no significant rash noted at this time but patient responded well to Lidex ointment in the past for this condition  Prescription for Lidex was provided  Libra bruno

## 2020-09-30 ENCOUNTER — OFFICE VISIT (OUTPATIENT)
Dept: CARDIOLOGY CLINIC | Facility: CLINIC | Age: 55
End: 2020-09-30
Payer: COMMERCIAL

## 2020-09-30 VITALS
WEIGHT: 155 LBS | BODY MASS INDEX: 28.52 KG/M2 | TEMPERATURE: 98 F | HEIGHT: 62 IN | OXYGEN SATURATION: 97 % | HEART RATE: 65 BPM | DIASTOLIC BLOOD PRESSURE: 78 MMHG | SYSTOLIC BLOOD PRESSURE: 132 MMHG

## 2020-09-30 DIAGNOSIS — I49.8 ATRIAL BIGEMINY: Primary | ICD-10-CM

## 2020-09-30 DIAGNOSIS — E66.3 OVERWEIGHT: ICD-10-CM

## 2020-09-30 DIAGNOSIS — E78.2 MIXED DYSLIPIDEMIA: ICD-10-CM

## 2020-09-30 DIAGNOSIS — Z86.79 HISTORY OF PAROXYSMAL SUPRAVENTRICULAR TACHYCARDIA: ICD-10-CM

## 2020-09-30 PROCEDURE — 93000 ELECTROCARDIOGRAM COMPLETE: CPT | Performed by: INTERNAL MEDICINE

## 2020-09-30 PROCEDURE — 99214 OFFICE O/P EST MOD 30 MIN: CPT | Performed by: INTERNAL MEDICINE

## 2020-09-30 NOTE — PATIENT INSTRUCTIONS
1  For whooshing in head, attempt to check blood pressure while that is occurring at least once or twice, and notify us if blood pressure is significantly elevated  2  Consider seeking Neurology consultation regarding any persistent increase in frequency of migraines  3  Continue current medication and exercise program   4  Cardiology follow-up with 24 hour Holter monitor in approximately six months, as well as office EKG

## 2020-09-30 NOTE — PROGRESS NOTES
Office Cardiology Progress Note  Yves Wisdom 50 y o  female MRN: [id number]  09/30/20  8:54 AM      ASSESSMENT:    1  Asymptomatic atrial bigeminy/trigeminy as of today  2  Much improved palpitations overall, with last flare up 1-1/2+ years  ago, and history of AVNRT/PSVT diagnosed in 2005  3  Mixed dyslipidemia with resolution of residual increase in triglycerides and prior 1 1% 10 year and 38 8% lifetime ASCVD risks  4  History of chest pain with normal nuclear stress test on 9/29/16   5  29 8 pound weight gain since January, 2015 and  10 pound weight gain in approximately  1-1/2+ years  6   Moderate overweight with 3 pound weight loss in approximately seven months  7   White coat hypertension of recent onset with no evidence of hypertension today  8   Resolved cotton wool exudates on recent eye examination and bilateral iritis, recently treated with steroid eyedrops    9  Recent whooshing sensation in head with uncontrolled hypertension to be excluded as cause  10  Recent increase in frequency of migraine headaches to once weekly  Plan       Patient Instructions     1  For whooshing in head, attempt to check blood pressure while that is occurring at least once or twice, and notify us if blood pressure is significantly elevated  2  Consider seeking Neurology consultation regarding any persistent increase in frequency of migraines  3  Continue current medication and exercise program   4  Cardiology follow-up with 24 hour Holter monitor in approximately six months, as well as office EKG  HPI    This 50 y o  female  denies new cardiopulmonary symptoms  Her palpitations are only brief and occasional and have not worsened  For about one month, the patient has been using meloxicam daily because of tendinitis on the dorsum of her left foot  This is now improving  The patient complains of increased frequency of migraine headaches to once a week for about two months      The patient occasionally has a whooshing sensation in her head  The patient has a new grandson born about three days ago  This is in addition to her 2-4/4 year old grandson and 7-4/3year-old granddaughter  This patient continues to work as a  at [employer] in [place], [place]  since May, 2019  She also continues to live in a house not far from her place of work that she brought with her boyfriend  Encounter Diagnoses   Name Primary?  Atrial bigeminy Yes    History of paroxysmal supraventricular tachycardia     Mixed dyslipidemia     Overweight         Review of Systems    All other systems negative, except as noted in history of present illness    Historical Information   Past Medical History:   Diagnosis Date    Abnormal Pap smear of cervix     in the past     Carpal tunnel syndrome     Right    Functional heart murmur in      GERD (gastroesophageal reflux disease)     no meds    Heart murmur     History of paroxysmal supraventricular tachycardia     History of urinary tract infection     recurrent in past     Hyperlipidemia     Miscarriage     Uterine leiomyoma     had hysterectomy    Wears contact lenses     Wears glasses      Past Surgical History:   Procedure Laterality Date    HERNIA REPAIR      pt was 5months old    HYSTERECTOMY      LASER ABLATION N/A     vaginal lesion(s)    MOLE EXCISION      OOPHORECTOMY      WI ANKLE SCOPE,PLANTAR FASCIOTOMY Right 2017    Procedure: RELEASE FASCIA PLANTAR/FASCIOTOMY ENDOSCOPIC (EPF);   Surgeon: Mauri So DPM;  Location: [facility];  Service: Podiatry    WI CYSTOURETHROSCOPY N/A 2016    Procedure: Jeannine Amend;  Surgeon: Priyanka Orourke DO;  [facility];  Service: Gynecology    WI LAPAROSCOPY W TOT HYSTERECTUTERUS <=250 GRAM  W TUBE/OVARY N/A 2016    Procedure: TOTAL LAPAROSCOPIC HYSTERECTOMY ;  Surgeon: Priyanka Orourke DO;  [facility];  Service: Gynecology    WI PART [address] [place] [address] FASCIA Left 4/8/2019    Procedure: LEFT  INSTEP PLANTAR FASCIOTOMY;  Surgeon: Arvin Rice DPM;  Location: [address] [place] [address] MAIN OR;  Service: Podiatry    OK WRIST Lele Soles LIG Right 1/29/2016    Procedure: ENDOSCOPIC CARPAL TUNNEL RELEASE;  Surgeon: Gold Echevarria MD;  [facility];  Service: Orthopedics    SALPINGECTOMY Bilateral 1/12/2016    Procedure: REMOVAL OF BOTH FALLOPIAN TUBES;  Surgeon: Jacobo Goldberg, DO;  [facility];  Service:     TUBAL LIGATION       Social History     Substance and Sexual Activity   Alcohol Use Yes    Alcohol/week: 2 0 standard drinks    Types: 2 Glasses of wine per week    Frequency: 2-3 times a week    Drinks per session: 1 or 2    Comment: occasional alcohol use     Social History     Substance and Sexual Activity   Drug Use Never     Social History     Tobacco Use   Smoking Status Never Smoker   Smokeless Tobacco Never Used       Family History:  Family History   Problem Relation Age of Onset    Diabetes Mother     Uterine cancer Mother     Obesity Mother     COPD Father     Hypothyroidism Sister     Scleroderma Sister     Atrial fibrillation Brother     Scleroderma Brother     Heart failure Maternal Grandmother     Heart failure Maternal Grandfather     Heart attack Paternal Grandmother     No Known Problems Sister     No Known Problems Daughter     No Known Problems Son     No Known Problems Paternal Grandfather     No Known Problems Daughter          Meds/Allergies     Prior to Admission medications    Medication Sig Start Date End Date Taking?  Authorizing Provider   atorvastatin (LIPITOR) 20 mg tablet Take 1 tablet (20 mg total) by mouth daily 12/18/19  Yes Kusum Burton MD   Calcium Carbonate-Vit D-Min (CALCIUM 1200 PO) Take by mouth   Yes Historical Provider, MD   Cholecalciferol (VITAMIN D3 PO) Take by mouth   Yes Historical Provider, MD   Cranberry-Vit C-Lactobacillus (CRANBERRY/PROBIOTIC/VIT C) 450-30 MG TABS Take 1 tablet by mouth daily   Yes Ava Woodard MD   estradiol (ESTRACE) 0 5 MG tablet Take 1 tablet (0 5 mg total) by mouth daily 9/23/19  Yes Darcy Álvarez DO   fluocinonide (LIDEX) 0 05 % ointment Apply topically 2 (two) times a day 9/29/20  Yes John Romero DPM   meloxicam (MOBIC) 15 mg tablet Take 1 tablet (15 mg total) by mouth daily 9/1/20 10/1/20 Yes John Romero DPM   metoprolol succinate (TOPROL-XL) 50 mg 24 hr tablet TAKE ONE TABLET BY MOUTH EVERY DAY 2/18/20  Yes Michelle Espinal MD   omeprazole (PriLOSEC OTC) 20 MG tablet Take 1 tablet (20 mg total) by mouth daily  Patient taking differently: Take 20 mg by mouth every other day  3/6/20  Yes Michelle Espinal MD   progesterone (PROMETRIUM) 100 MG capsule Take 1 capsule (100 mg total) by mouth daily at bedtime 9/23/19  Yes Darcy Álvarez DO   meloxicam (MOBIC) 15 mg tablet Take 1 tablet (15 mg total) by mouth every other day  Patient taking differently: Take 15 mg by mouth daily  3/6/20 9/30/20 Yes Michelle Espinal MD       Allergies   Allergen Reactions    Levaquin [Levofloxacin] Anaphylaxis and Swelling     L         Vitals:    09/30/20 0748   BP: 132/78   BP Location: Right arm   Patient Position: Sitting   Cuff Size: Large   Pulse: 65   Temp: 98 °F (36 7 °C)   TempSrc: Temporal   SpO2: 97%   Weight: 70 3 kg (155 lb)   Height: 5' 2" (1 575 m)       Body mass index is 28 35 kg/m²  3 pound weight loss in approximately seven months    Physical Exam:    General Appearance:  Alert, cooperative, no distress, appears stated age and is mildly overweight     Head:  Normocephalic, without obvious abnormality, atraumatic   Eyes:  PERRL, conjunctiva/corneas clear, EOM's intact,   both eyes   Ears:  Normal TM's and external ear canals, both ears   Nose: Nares normal, septum midline, mucosa normal, no drainage or sinus tenderness   Throat: Lips, mucosa, and tongue normal; teeth and gums normal   Neck: Supple, symmetrical, trachea midline, no adenopathy, thyroid: not enlarged, symmetric, no tenderness/mass/nodules, no carotid bruit or JVD   Back:   Symmetric, no curvature, ROM normal, no CVA tenderness   Lungs:   Clear to auscultation bilaterally, respirations unlabored   Chest Wall:  No tenderness or deformity   Heart:  Regular rate and rhythm, S1, S2 normal, no murmur, rub or gallop   Abdomen:   Soft, non-tender, bowel sounds active all four quadrants,  no masses, no organomegaly   Extremities: Extremities normal, atraumatic, no cyanosis or edema   Pulses: 2+ and symmetric   Skin: Skin showed normal color, texture, turgor and no rashes or lesions   Lymph nodes: Cervical, supraclavicular, and axillary nodes normal   Neurologic: Normal         Cardiographics    ECG 09/30/20:    Normal sinus rhythm at 65 bpm   Periods of occasional atrial bigeminy  New PACs with no other changes compared to 03/06/2020  IMAGING:    No Chest XR results available for this patient            LAB REVIEW:      Lab Results   Component Value Date    SODIUM 138 09/03/2020    K 4 7 09/03/2020     (H) 09/03/2020    CO2 24 09/03/2020    ANIONGAP 8 03/19/2015    BUN 16 09/03/2020    CREATININE 0 96 09/03/2020    GLUCOSE 83 03/19/2015    GLUF 91 09/03/2020    CALCIUM 9 6 09/03/2020    AST 16 09/03/2020    ALT 25 09/03/2020    ALKPHOS 47 09/03/2020    PROT 7 7 03/19/2015    BILITOT 0 59 03/19/2015    EGFR 68 09/03/2020   Glucose 09/03/2020:  91, normal total protein and total bilirubin on 09/03/2020    Lab Results   Component Value Date    CHOLESTEROL 178 09/03/2020    CHOLESTEROL 165 08/27/2019    CHOLESTEROL 154 08/07/2018     Lab Results   Component Value Date    HDL 69 09/03/2020    HDL 62 (H) 08/27/2019    HDL 62 (H) 08/07/2018       Lab Results   Component Value Date    LDLCALC 84 09/03/2020    LDLCALC 66 08/27/2019    LDLCALC 66 08/07/2018       Lab Results   Component Value Date    TRIG 124 09/03/2020    TRIG 186 (H) 08/27/2019    TRIG 130 08/07/2018               Hien Jiang MD

## 2020-10-06 ENCOUNTER — TELEPHONE (OUTPATIENT)
Dept: OBGYN CLINIC | Facility: CLINIC | Age: 55
End: 2020-10-06

## 2020-10-06 DIAGNOSIS — N95.1 SYMPTOMATIC MENOPAUSAL OR FEMALE CLIMACTERIC STATES: ICD-10-CM

## 2020-10-06 RX ORDER — ESTRADIOL 0.5 MG/1
0.5 TABLET ORAL DAILY
Qty: 90 TABLET | Refills: 3 | Status: SHIPPED | OUTPATIENT
Start: 2020-10-06 | End: 2021-10-11

## 2020-10-19 DIAGNOSIS — M65.872 OTHER SYNOVITIS AND TENOSYNOVITIS, LEFT ANKLE AND FOOT: Primary | ICD-10-CM

## 2020-10-19 RX ORDER — MELOXICAM 15 MG/1
15 TABLET ORAL DAILY
Qty: 90 TABLET | Refills: 0 | Status: SHIPPED | OUTPATIENT
Start: 2020-10-19 | End: 2021-02-25 | Stop reason: ALTCHOICE

## 2020-12-01 DIAGNOSIS — I47.1 PSVT (PAROXYSMAL SUPRAVENTRICULAR TACHYCARDIA) (HCC): ICD-10-CM

## 2020-12-01 RX ORDER — METOPROLOL SUCCINATE 50 MG/1
50 TABLET, EXTENDED RELEASE ORAL DAILY
Qty: 7 TABLET | Refills: 1 | Status: SHIPPED | OUTPATIENT
Start: 2020-12-01 | End: 2021-03-04 | Stop reason: SDUPTHER

## 2020-12-09 ENCOUNTER — TELEPHONE (OUTPATIENT)
Dept: FAMILY MEDICINE CLINIC | Facility: MEDICAL CENTER | Age: 55
End: 2020-12-09

## 2020-12-09 DIAGNOSIS — Z20.822 EXPOSURE TO COVID-19 VIRUS: ICD-10-CM

## 2020-12-09 DIAGNOSIS — Z20.822 EXPOSURE TO COVID-19 VIRUS: Primary | ICD-10-CM

## 2020-12-09 PROCEDURE — U0003 INFECTIOUS AGENT DETECTION BY NUCLEIC ACID (DNA OR RNA); SEVERE ACUTE RESPIRATORY SYNDROME CORONAVIRUS 2 (SARS-COV-2) (CORONAVIRUS DISEASE [COVID-19]), AMPLIFIED PROBE TECHNIQUE, MAKING USE OF HIGH THROUGHPUT TECHNOLOGIES AS DESCRIBED BY CMS-2020-01-R: HCPCS | Performed by: FAMILY MEDICINE

## 2020-12-10 ENCOUNTER — TELEPHONE (OUTPATIENT)
Dept: FAMILY MEDICINE CLINIC | Facility: MEDICAL CENTER | Age: 55
End: 2020-12-10

## 2020-12-10 LAB — SARS-COV-2 RNA SPEC QL NAA+PROBE: NOT DETECTED

## 2020-12-14 ENCOUNTER — ANNUAL EXAM (OUTPATIENT)
Dept: OBGYN CLINIC | Facility: CLINIC | Age: 55
End: 2020-12-14
Payer: COMMERCIAL

## 2020-12-14 VITALS
BODY MASS INDEX: 28.52 KG/M2 | HEIGHT: 62 IN | DIASTOLIC BLOOD PRESSURE: 74 MMHG | SYSTOLIC BLOOD PRESSURE: 122 MMHG | WEIGHT: 155 LBS

## 2020-12-14 DIAGNOSIS — Z01.419 WOMEN'S ANNUAL ROUTINE GYNECOLOGICAL EXAMINATION: Primary | ICD-10-CM

## 2020-12-14 DIAGNOSIS — Z12.31 ENCOUNTER FOR SCREENING MAMMOGRAM FOR MALIGNANT NEOPLASM OF BREAST: ICD-10-CM

## 2020-12-14 DIAGNOSIS — Z79.890 HORMONE REPLACEMENT THERAPY, POSTMENOPAUSAL: ICD-10-CM

## 2020-12-14 DIAGNOSIS — N39.3 SUI (STRESS URINARY INCONTINENCE, FEMALE): ICD-10-CM

## 2020-12-14 PROCEDURE — 99396 PREV VISIT EST AGE 40-64: CPT | Performed by: OBSTETRICS & GYNECOLOGY

## 2020-12-21 DIAGNOSIS — E78.2 MIXED HYPERLIPIDEMIA: ICD-10-CM

## 2020-12-21 RX ORDER — ATORVASTATIN CALCIUM 20 MG/1
TABLET, FILM COATED ORAL
Qty: 90 TABLET | Refills: 3 | Status: SHIPPED | OUTPATIENT
Start: 2020-12-21 | End: 2020-12-23 | Stop reason: SDUPTHER

## 2020-12-23 DIAGNOSIS — E78.2 MIXED HYPERLIPIDEMIA: ICD-10-CM

## 2020-12-23 RX ORDER — ATORVASTATIN CALCIUM 20 MG/1
20 TABLET, FILM COATED ORAL DAILY
Qty: 90 TABLET | Refills: 3 | Status: SHIPPED | OUTPATIENT
Start: 2020-12-23 | End: 2021-12-27

## 2021-01-06 ENCOUNTER — IMMUNIZATIONS (OUTPATIENT)
Dept: FAMILY MEDICINE CLINIC | Facility: HOSPITAL | Age: 56
End: 2021-01-06

## 2021-01-06 DIAGNOSIS — Z23 ENCOUNTER FOR IMMUNIZATION: ICD-10-CM

## 2021-01-06 PROCEDURE — 91301 SARS-COV-2 / COVID-19 MRNA VACCINE (MODERNA) 100 MCG: CPT

## 2021-01-06 PROCEDURE — 0011A SARS-COV-2 / COVID-19 MRNA VACCINE (MODERNA) 100 MCG: CPT

## 2021-02-05 ENCOUNTER — IMMUNIZATIONS (OUTPATIENT)
Dept: FAMILY MEDICINE CLINIC | Facility: HOSPITAL | Age: 56
End: 2021-02-05

## 2021-02-05 DIAGNOSIS — Z23 ENCOUNTER FOR IMMUNIZATION: Primary | ICD-10-CM

## 2021-02-05 PROCEDURE — 91301 SARS-COV-2 / COVID-19 MRNA VACCINE (MODERNA) 100 MCG: CPT

## 2021-02-05 PROCEDURE — 0012A SARS-COV-2 / COVID-19 MRNA VACCINE (MODERNA) 100 MCG: CPT

## 2021-02-25 ENCOUNTER — OFFICE VISIT (OUTPATIENT)
Dept: FAMILY MEDICINE CLINIC | Facility: MEDICAL CENTER | Age: 56
End: 2021-02-25
Payer: COMMERCIAL

## 2021-02-25 VITALS
HEIGHT: 62 IN | BODY MASS INDEX: 27.97 KG/M2 | HEART RATE: 70 BPM | RESPIRATION RATE: 16 BRPM | TEMPERATURE: 98 F | DIASTOLIC BLOOD PRESSURE: 76 MMHG | SYSTOLIC BLOOD PRESSURE: 126 MMHG | WEIGHT: 152 LBS

## 2021-02-25 DIAGNOSIS — M54.2 NECK PAIN: Primary | ICD-10-CM

## 2021-02-25 PROCEDURE — 99213 OFFICE O/P EST LOW 20 MIN: CPT | Performed by: FAMILY MEDICINE

## 2021-02-25 RX ORDER — NABUMETONE 500 MG/1
TABLET, FILM COATED ORAL
Qty: 30 TABLET | Refills: 0 | Status: SHIPPED | OUTPATIENT
Start: 2021-02-25 | End: 2021-03-08

## 2021-02-25 RX ORDER — METAXALONE 400 MG/1
TABLET ORAL
Qty: 30 TABLET | Refills: 0 | Status: SHIPPED | OUTPATIENT
Start: 2021-02-25 | End: 2021-03-08

## 2021-02-25 NOTE — PROGRESS NOTES
Assessment/Plan:       Diagnoses and all orders for this visit:    Neck pain  -     nabumetone (RELAFEN) 500 mg tablet; Take 1 tab PO BID x 5 days then PRN pain  -     metaxalone (SKELAXIN) 400 MG tablet; Take 1-2 tabs PO qHS PRN pain    Neck pain is likely secondary to muscular spasm  I will have patient try different muscle relaxer  I will have her use a prescription strength anti-inflammatory  Patient instructed not drive, drink alcohol or operate firearms when she takes the muscle relaxer as it will cause sedation  Physical therapy recommended but patient declined  Follow-up in one month if symptoms persist or sooner if needed  Subjective:      Patient ID: Ken Mendoza is a 50 y o  female  Patient presents with a complaint of neck pain  She states she fell on the ice twice  Does not believe she hit her head  The 1st time she landed on her right shoulder  The 2nd time she landed on her back  She has been having neck pain and shoulder pain  Tried Advil with mild relief of symptoms  Tried Flexeril from a previous prescription with no relief of symptoms  Using her hot tub however does help improve her pain  No difficulty walking  No weakness in the arms or legs  Has difficulty moving her head secondary to pain  The following portions of the patient's history were reviewed and updated as appropriate: She  has a past medical history of Abnormal Pap smear of cervix, Carpal tunnel syndrome, Functional heart murmur in , GERD (gastroesophageal reflux disease), Heart murmur, History of paroxysmal supraventricular tachycardia, History of urinary tract infection, Hyperlipidemia, Miscarriage, Uterine leiomyoma, Wears contact lenses, and Wears glasses    She   Patient Active Problem List    Diagnosis Date Noted    Arthralgia 2020    Osteopenia of neck of femur 2020    Pure hypercholesterolemia 2019    Gastroesophageal reflux disease without esophagitis 2019  History of paroxysmal supraventricular tachycardia 03/09/2018     She  has a past surgical history that includes Laser ablation (N/A); Tubal ligation; pr wrist arthroscop,release xvers lig (Right, 1/29/2016); pr laparoscopy w tot hysterectuterus <=250 gram  w tube/ovary (N/A, 1/12/2016); pr cystourethroscopy (N/A, 1/12/2016); Salpingectomy (Bilateral, 1/12/2016); Hernia repair; MOLE EXCISION; pr ankle scope,plantar fasciotomy (Right, 7/21/2017); pr part excis plantar fascia (Left, 4/8/2019); Hysterectomy; and Oophorectomy  Her family history includes Atrial fibrillation in her brother; COPD in her father; Diabetes in her mother; Heart attack in her paternal grandmother; Heart failure in her maternal grandfather and maternal grandmother; Hypothyroidism in her sister; No Known Problems in her daughter, daughter, paternal grandfather, sister, and son; Obesity in her mother; Scleroderma in her brother and sister; Uterine cancer in her mother  She  reports that she has never smoked  She has never used smokeless tobacco  She reports current alcohol use of about 2 0 standard drinks of alcohol per week  She reports that she does not use drugs    Current Outpatient Medications   Medication Sig Dispense Refill    atorvastatin (LIPITOR) 20 mg tablet Take 1 tablet (20 mg total) by mouth daily 90 tablet 3    Calcium Carbonate-Vit D-Min (CALCIUM 1200 PO) Take by mouth      Cholecalciferol (VITAMIN D3 PO) Take by mouth      Cranberry-Vit C-Lactobacillus (CRANBERRY/PROBIOTIC/VIT C) 450-30 MG TABS Take 1 tablet by mouth daily      estradiol (ESTRACE) 0 5 MG tablet Take 1 tablet (0 5 mg total) by mouth daily 90 tablet 3    fluocinonide (LIDEX) 0 05 % ointment Apply topically 2 (two) times a day 30 g 0    metoprolol succinate (TOPROL-XL) 50 mg 24 hr tablet Take 1 tablet (50 mg total) by mouth daily 7 tablet 1    omeprazole (PriLOSEC OTC) 20 MG tablet Take 1 tablet (20 mg total) by mouth daily (Patient taking differently: Take 20 mg by mouth every other day ) 100 tablet 5    progesterone (PROMETRIUM) 100 MG capsule Take 1 capsule (100 mg total) by mouth daily at bedtime 90 capsule 3    metaxalone (SKELAXIN) 400 MG tablet Take 1-2 tabs PO qHS PRN pain 30 tablet 0    nabumetone (RELAFEN) 500 mg tablet Take 1 tab PO BID x 5 days then PRN pain 30 tablet 0     No current facility-administered medications for this visit  Current Outpatient Medications on File Prior to Visit   Medication Sig    atorvastatin (LIPITOR) 20 mg tablet Take 1 tablet (20 mg total) by mouth daily    Calcium Carbonate-Vit D-Min (CALCIUM 1200 PO) Take by mouth    Cholecalciferol (VITAMIN D3 PO) Take by mouth    Cranberry-Vit C-Lactobacillus (CRANBERRY/PROBIOTIC/VIT C) 450-30 MG TABS Take 1 tablet by mouth daily    estradiol (ESTRACE) 0 5 MG tablet Take 1 tablet (0 5 mg total) by mouth daily    fluocinonide (LIDEX) 0 05 % ointment Apply topically 2 (two) times a day    metoprolol succinate (TOPROL-XL) 50 mg 24 hr tablet Take 1 tablet (50 mg total) by mouth daily    omeprazole (PriLOSEC OTC) 20 MG tablet Take 1 tablet (20 mg total) by mouth daily (Patient taking differently: Take 20 mg by mouth every other day )    progesterone (PROMETRIUM) 100 MG capsule Take 1 capsule (100 mg total) by mouth daily at bedtime    [DISCONTINUED] meloxicam (MOBIC) 15 mg tablet Take 1 tablet (15 mg total) by mouth daily     No current facility-administered medications on file prior to visit  She is allergic to levaquin [levofloxacin]       Review of Systems   Constitutional: Negative for fever  Respiratory: Negative for shortness of breath  Cardiovascular: Negative for chest pain           Objective:      /76 (BP Location: Left arm, Patient Position: Sitting, Cuff Size: Adult)   Pulse 70   Temp 98 °F (36 7 °C)   Resp 16   Ht 5' 2" (1 575 m)   Wt 68 9 kg (152 lb)   BMI 27 80 kg/m²          Physical Exam  Constitutional:       General: She is not in acute distress  Appearance: She is not ill-appearing  Pulmonary:      Effort: No respiratory distress  Musculoskeletal:      Cervical back: She exhibits decreased range of motion, tenderness, pain and spasm  She exhibits no bony tenderness, no swelling, no edema, no deformity, no laceration and normal pulse     Psychiatric:         Mood and Affect: Mood normal

## 2021-03-04 ENCOUNTER — TELEPHONE (OUTPATIENT)
Dept: CARDIOLOGY CLINIC | Facility: CLINIC | Age: 56
End: 2021-03-04

## 2021-03-04 DIAGNOSIS — I47.1 PSVT (PAROXYSMAL SUPRAVENTRICULAR TACHYCARDIA) (HCC): ICD-10-CM

## 2021-03-04 RX ORDER — METOPROLOL SUCCINATE 50 MG/1
50 TABLET, EXTENDED RELEASE ORAL DAILY
Qty: 90 TABLET | Refills: 3 | Status: SHIPPED | OUTPATIENT
Start: 2021-03-04 | End: 2022-03-08

## 2021-03-04 NOTE — TELEPHONE ENCOUNTER
Patient is requesting a refill of her metoprolol be sent to [facility] to [facility] in [facility]

## 2021-03-08 ENCOUNTER — OFFICE VISIT (OUTPATIENT)
Dept: OBGYN CLINIC | Facility: MEDICAL CENTER | Age: 56
End: 2021-03-08
Payer: COMMERCIAL

## 2021-03-08 VITALS
HEIGHT: 62 IN | BODY MASS INDEX: 27.97 KG/M2 | HEART RATE: 78 BPM | WEIGHT: 152 LBS | SYSTOLIC BLOOD PRESSURE: 152 MMHG | DIASTOLIC BLOOD PRESSURE: 85 MMHG

## 2021-03-08 DIAGNOSIS — M77.01 MEDIAL EPICONDYLITIS OF RIGHT ELBOW: ICD-10-CM

## 2021-03-08 DIAGNOSIS — M77.11 LATERAL EPICONDYLITIS OF RIGHT ELBOW: Primary | ICD-10-CM

## 2021-03-08 PROCEDURE — 99203 OFFICE O/P NEW LOW 30 MIN: CPT | Performed by: ORTHOPAEDIC SURGERY

## 2021-03-08 PROCEDURE — 20551 NJX 1 TENDON ORIGIN/INSJ: CPT | Performed by: ORTHOPAEDIC SURGERY

## 2021-03-08 RX ORDER — TRIAMCINOLONE ACETONIDE 40 MG/ML
40 INJECTION, SUSPENSION INTRA-ARTICULAR; INTRAMUSCULAR
Status: COMPLETED | OUTPATIENT
Start: 2021-03-08 | End: 2021-03-08

## 2021-03-08 RX ORDER — LIDOCAINE HYDROCHLORIDE 10 MG/ML
1 INJECTION, SOLUTION INFILTRATION; PERINEURAL
Status: COMPLETED | OUTPATIENT
Start: 2021-03-08 | End: 2021-03-08

## 2021-03-08 RX ADMIN — TRIAMCINOLONE ACETONIDE 40 MG: 40 INJECTION, SUSPENSION INTRA-ARTICULAR; INTRAMUSCULAR at 15:06

## 2021-03-08 RX ADMIN — LIDOCAINE HYDROCHLORIDE 1 ML: 10 INJECTION, SOLUTION INFILTRATION; PERINEURAL at 15:06

## 2021-03-16 ENCOUNTER — HOSPITAL ENCOUNTER (OUTPATIENT)
Dept: NON INVASIVE DIAGNOSTICS | Facility: HOSPITAL | Age: 56
Discharge: HOME | End: 2021-03-16
Payer: COMMERCIAL

## 2021-03-16 DIAGNOSIS — Z86.79 HISTORY OF PAROXYSMAL SUPRAVENTRICULAR TACHYCARDIA: ICD-10-CM

## 2021-03-16 DIAGNOSIS — I49.8 ATRIAL BIGEMINY: ICD-10-CM

## 2021-03-16 PROCEDURE — 93226 XTRNL ECG REC<48 HR SCAN A/R: CPT

## 2021-03-16 PROCEDURE — 93225 XTRNL ECG REC<48 HRS REC: CPT

## 2021-03-20 PROCEDURE — 93227 XTRNL ECG REC<48 HR R&I: CPT | Performed by: INTERNAL MEDICINE

## 2021-03-25 ENCOUNTER — OFFICE VISIT (OUTPATIENT)
Dept: OBGYN CLINIC | Facility: CLINIC | Age: 56
End: 2021-03-25
Payer: COMMERCIAL

## 2021-03-25 VITALS
DIASTOLIC BLOOD PRESSURE: 87 MMHG | HEART RATE: 71 BPM | SYSTOLIC BLOOD PRESSURE: 137 MMHG | HEIGHT: 62 IN | WEIGHT: 152 LBS | BODY MASS INDEX: 27.97 KG/M2

## 2021-03-25 DIAGNOSIS — M77.01 MEDIAL EPICONDYLITIS OF RIGHT ELBOW: Primary | ICD-10-CM

## 2021-03-25 DIAGNOSIS — M77.11 LATERAL EPICONDYLITIS OF RIGHT ELBOW: ICD-10-CM

## 2021-03-25 PROCEDURE — 99213 OFFICE O/P EST LOW 20 MIN: CPT | Performed by: ORTHOPAEDIC SURGERY

## 2021-03-25 NOTE — PROGRESS NOTES
CHIEF COMPLAINT:  Chief Complaint   Patient presents with    Right Elbow - Follow-up       SUBJECTIVE:  Terra Bernal is a 50y o  year old RHD female who presents to the office S/P right lateral epicondylitis CSI administered 3/8/2021  Patient was also experiencing right medial epicondylitis symptoms at her last visit  Today patient states she had relief after the cortisone injection  She does note occasional discomfort over the medial epicondyle  She states she has been taking some Tylenol and anti-inflammatories for this issue as well as work on heat and stretch which seems to help  She denies any numbness or tingling  PAST MEDICAL HISTORY:  Past Medical History:   Diagnosis Date    Abnormal Pap smear of cervix     in the past     Carpal tunnel syndrome     Right    Functional heart murmur in      GERD (gastroesophageal reflux disease)     no meds    Heart murmur     History of paroxysmal supraventricular tachycardia     History of urinary tract infection     recurrent in past     Hyperlipidemia     Miscarriage     Uterine leiomyoma     had hysterectomy    Wears contact lenses     Wears glasses        PAST SURGICAL HISTORY:  Past Surgical History:   Procedure Laterality Date    HERNIA REPAIR      pt was 5months old    HYSTERECTOMY      LASER ABLATION N/A     vaginal lesion(s)    MOLE EXCISION      OOPHORECTOMY      CO ANKLE SCOPE,PLANTAR FASCIOTOMY Right 2017    Procedure: RELEASE FASCIA PLANTAR/FASCIOTOMY ENDOSCOPIC (EPF);   Surgeon: Marisel Cano DPM;  [facility];  Service: Podiatry    CO CYSTOURETHROSCOPY N/A 2016    Procedure: Claribel Forrest;  Surgeon: Janet Flores DO;  [facility];  Service: Gynecology    CO LAPAROSCOPY W TOT HYSTERECTUTERUS <=250 GRAM  W TUBE/OVARY N/A 2016    Procedure: TOTAL LAPAROSCOPIC HYSTERECTOMY ;  Surgeon: Janet Flores DO;  [facility];  Service: Gynecology    CO PART EXCIS PLANTAR FASCIA Left 4/8/2019    Procedure: LEFT  INSTEP PLANTAR FASCIOTOMY;  Surgeon: Roxanne Ramires DPM;  Location: [facility] MAIN OR;  Service: [address] LIG Right 1/29/2016    Procedure: ENDOSCOPIC CARPAL TUNNEL RELEASE;  Surgeon: Deb Galloway MD;  [facility];  Service: Orthopedics    SALPINGECTOMY Bilateral 1/12/2016    Procedure: REMOVAL OF BOTH FALLOPIAN TUBES;  Surgeon: Janet Flores DO;  [facility];  Service:     TUBAL LIGATION         FAMILY HISTORY:  Family History   Problem Relation Age of Onset    Diabetes Mother     Uterine cancer Mother     Obesity Mother     COPD Father     Hypothyroidism Sister     Scleroderma Sister     Atrial fibrillation Brother     Scleroderma Brother     Heart failure Maternal Grandmother     Heart failure Maternal Grandfather     Heart attack Paternal Grandmother     No Known Problems Sister     No Known Problems Daughter     No Known Problems Son     No Known Problems Paternal Grandfather     No Known Problems Daughter        SOCIAL HISTORY:  Social History     Tobacco Use    Smoking status: Never Smoker    Smokeless tobacco: Never Used   Substance Use Topics    Alcohol use:  Yes     Alcohol/week: 2 0 standard drinks     Types: 2 Glasses of wine per week     Frequency: 2-3 times a week     Drinks per session: 1 or 2     Comment: occasional alcohol use    Drug use: Never       MEDICATIONS:    Current Outpatient Medications:     atorvastatin (LIPITOR) 20 mg tablet, Take 1 tablet (20 mg total) by mouth daily, Disp: 90 tablet, Rfl: 3    Calcium Carbonate-Vit D-Min (CALCIUM 1200 PO), Take by mouth, Disp: , Rfl:     Cholecalciferol (VITAMIN D3 PO), Take by mouth, Disp: , Rfl:     Cranberry-Vit C-Lactobacillus (CRANBERRY/PROBIOTIC/VIT C) 450-30 MG TABS, Take 1 tablet by mouth daily, Disp: , Rfl:     estradiol (ESTRACE) 0 5 MG tablet, Take 1 tablet (0 5 mg total) by mouth daily, Disp: 90 tablet, Rfl: 3    fluocinonide (LIDEX) 0 05 % ointment, Apply topically 2 (two) times a day, Disp: 30 g, Rfl: 0    metoprolol succinate (TOPROL-XL) 50 mg 24 hr tablet, Take 1 tablet (50 mg total) by mouth daily, Disp: 90 tablet, Rfl: 3    omeprazole (PriLOSEC OTC) 20 MG tablet, Take 1 tablet (20 mg total) by mouth daily (Patient taking differently: Take 20 mg by mouth every other day ), Disp: 100 tablet, Rfl: 5    progesterone (PROMETRIUM) 100 MG capsule, Take 1 capsule (100 mg total) by mouth daily at bedtime, Disp: 90 capsule, Rfl: 3    ALLERGIES:  Allergies   Allergen Reactions    Levaquin [Levofloxacin] Anaphylaxis and Swelling     L       REVIEW OF SYSTEMS:  Review of Systems   ROS:   General: no fever, no chills  HEENT:  No loss of hearing or eyesight problems  Eyes:  No red eyes  Respiratory:  No coughing, shortness of breath or wheezing  Cardiovascular:  No chest pain, no palpitations  GI:  Abdomen soft nontender, denies nausea  Endocrine:  No muscle weakness, no frequent urination, no excessive thirst  Urinary:  No dysuria, no incontinence  Musculoskeletal: see HPI and PE  SKIN:  No skin rash, no dry skin  Neurological:  No headaches, no confusion  Psychiatric:  No suicide thoughts, no anxiety, no depression  Review of all other systems is negative    VITALS:  Vitals:    03/25/21 0750   BP: 137/87   Pulse: 71       LABS:  HgA1c:   Lab Results   Component Value Date    HGBA1C 5 3 08/07/2018     BMP:   Lab Results   Component Value Date    GLUCOSE 83 03/19/2015    CALCIUM 9 6 09/03/2020     03/19/2015    K 4 7 09/03/2020    CO2 24 09/03/2020     (H) 09/03/2020    BUN 16 09/03/2020    CREATININE 0 96 09/03/2020       _____________________________________________________  PHYSICAL EXAMINATION:  General: well developed and well nourished, alert, oriented times 3 and appears comfortable  Psychiatric: Normal  HEENT: Trachea Midline, No torticollis  Pulmonary: No audible wheezing or strider  Cardiovascular: No discernable arrhythmia   Skin: No masses, erythema, lacerations, fluctation, ulcerations  Neurovascular: Sensation Intact to the Median, Ulnar, Radial Nerve, Motor Intact to the Median, Ulnar, Radial Nerve and Pulses Intact    MUSCULOSKELETAL EXAMINATION:  Right Elbow:    No swelling or deformity  Full range of motion with flexion, extension, supination and pronation  Positive tenderness to palpation over the Medial Epicondyle  No pain with passive flexion of the wrist with elbow fully extended  Pain with resisted wrist flexion with elbow fully extended  Pain with resisted forearm pronation with the elbow fully extended  Negative tinels over the ulnar nerve at the elbow  ___________________________________________________  STUDIES REVIEWED:  No studies reviewed  PROCEDURES PERFORMED:  Procedures  No Procedures performed today    _____________________________________________________  ASSESSMENT/PLAN:    S/P Right lateral epicondylitis CSI 3/8/2021  - Daily home exercise program:  Wrist extension curls with 2-3 pounds of weight with elbow bent 2-3 times a day  Stretching exercises with elbow extended and wrist flexed 2-3 times a day with heat application to the elbow  This home exercise program should be done every day and continued after the elbow pain resolved  This will help the pain from returning     -  Patient will follow-up with us as needed    Right medial epicondylitis  -  Patient was advised to use heat as well as stretch and strengthening exercises  - she can take Tylenol and anti-inflammatories as needed for the pain   -  She will follow-up with us as needed    Follow Up:  Return if symptoms worsen or fail to improve        To Do Next Visit:  Re-evaluation of current issue      Scribe Attestation    I,:  Mahesh Angel PA-C am acting as a scribe while in the presence of the attending physician :       I,:  Flaca Lyman MD personally performed the services described in this documentation    as scribed in my presence :

## 2021-04-15 ENCOUNTER — OFFICE VISIT (OUTPATIENT)
Dept: CARDIOLOGY CLINIC | Facility: CLINIC | Age: 56
End: 2021-04-15
Payer: COMMERCIAL

## 2021-04-15 VITALS
DIASTOLIC BLOOD PRESSURE: 84 MMHG | TEMPERATURE: 99.1 F | WEIGHT: 158 LBS | HEIGHT: 62 IN | HEART RATE: 78 BPM | SYSTOLIC BLOOD PRESSURE: 130 MMHG | OXYGEN SATURATION: 96 % | BODY MASS INDEX: 29.08 KG/M2

## 2021-04-15 DIAGNOSIS — I49.8 ATRIAL BIGEMINY: ICD-10-CM

## 2021-04-15 DIAGNOSIS — E78.2 MIXED DYSLIPIDEMIA: ICD-10-CM

## 2021-04-15 DIAGNOSIS — Z86.79 HISTORY OF PAROXYSMAL SUPRAVENTRICULAR TACHYCARDIA: Primary | ICD-10-CM

## 2021-04-15 DIAGNOSIS — E66.3 OVERWEIGHT: ICD-10-CM

## 2021-04-15 PROCEDURE — 93000 ELECTROCARDIOGRAM COMPLETE: CPT | Performed by: INTERNAL MEDICINE

## 2021-04-15 PROCEDURE — 99214 OFFICE O/P EST MOD 30 MIN: CPT | Performed by: INTERNAL MEDICINE

## 2021-04-15 NOTE — PROGRESS NOTES
Office Cardiology Progress Note  Patricia Salazar 50 y o  female MRN: [id number]  04/15/21  8:48 AM      ASSESSMENT:    1  Asymptomatic atrial bigeminy/trigeminy , suppressed as of today  Benign Holter monitor 03/16/2021 with 309 PACs and 550 episodes of atrial bigeminy  2    Suppressed palpitations overall, with last flare up 2+ years  ago, and history of AVNRT/PSVT diagnosed in 2005  3  Mixed dyslipidemia with resolution of residual increase in triglycerides and prior 1 1% 10 year and 38 8% lifetime ASCVD risks  4  History of chest pain with normal nuclear stress test on 9/29/16   5  32 8 pound weight gain since January, 2015 and  13 pound weight gain in approximately 2+ years  6   Moderate overweight with 3 pound weight gain in approximately 6-1/2 months  7   White coat hypertension of recent onset with no evidence of hypertension today  8   Resolved cotton wool exudates on  previous eye examination and history ofbilateral iritis,  treated with steroid eyedrops    9   Suppressed whooshing sensation in head with uncontrolled hypertension to be excluded as cause  10  Stable frequency of migraine headaches to once every two weeks  Plan       Patient Instructions     1  Continue current management and medication  2  We have ordered a lipid panel and hemoglobin A1c for follow-up and screening  3  Cardiology follow-up approximately six months with EKG  HPI    This 50 y o  female  denies new cardiopulmonary and medical symptoms  She has had no recent palpitations, whooshing sensations in her head  Patient was treated with cortisone injection for right medial epicondylitis by Orthopedic surgery with last follow-up on 03/25/2021 with symptoms now resolved  This was triggered by extensive painting of her house and some of the surrounding smaller structures on her property  The patient has migraine headaches perhaps twice a month      The patient's mother just passed away from rectal cancer last week, approximately two months after diagnosis  The patient's youngest grandson is now about 10months-old and is red headed but has hearing impairment in one ear and is being fitted for hearing aids  Her other grandson is 11years old and granddaughter is 5years old  She also has two other grown children who are to be  soon  The patient continues to work as a  at [employer] in [place], [place], and next month will be her her two year anniversary at that job  Her home is not far from her place of work  The patient  is being seen in follow-up of the below listed diagnoses  Encounter Diagnoses   Name Primary?  History of paroxysmal supraventricular tachycardia Yes    Atrial bigeminy     Mixed dyslipidemia     Overweight         Review of Systems    All other systems negative, except as noted in history of present illness    Historical Information   Past Medical History:   Diagnosis Date    Abnormal Pap smear of cervix     in the past     Carpal tunnel syndrome     Right    Functional heart murmur in      GERD (gastroesophageal reflux disease)     no meds    Heart murmur     History of paroxysmal supraventricular tachycardia     History of urinary tract infection     recurrent in past     Hyperlipidemia     Miscarriage     Uterine leiomyoma     had hysterectomy    Wears contact lenses     Wears glasses      Past Surgical History:   Procedure Laterality Date    HERNIA REPAIR      pt was 5months old    HYSTERECTOMY      LASER ABLATION N/A     vaginal lesion(s)    MOLE EXCISION      OOPHORECTOMY      NV ANKLE SCOPE,PLANTAR FASCIOTOMY Right 2017    Procedure: RELEASE FASCIA PLANTAR/FASCIOTOMY ENDOSCOPIC (EPF);   Surgeon: Debbie Caban DPM;  [facility];  Service: Podiatry    NV CYSTOURETHROSCOPY N/A 2016    Procedure: [place];  Surgeon: Eileen Amezcua DO;  [facility];  Service: Gynecology    NV LAPAROSCOPY W TOT HYSTERECTUTERUS <=250 GRAM  W TUBE/OVARY N/A 1/12/2016    Procedure: TOTAL LAPAROSCOPIC HYSTERECTOMY ;  Surgeon: Melissa Cannon DO;  [facility];  Service: Gynecology    KS PART EXCIS PLANTAR FASCIA Left 4/8/2019    Procedure: LEFT  INSTEP PLANTAR FASCIOTOMY;  Surgeon: Tim Pastor DPM;  Location: [facility] MAIN OR;  Service: Podiatry    KS WRIST Shermiguelito Prakashman LIG Right 1/29/2016    Procedure: ENDOSCOPIC CARPAL TUNNEL RELEASE;  Surgeon: Kendy Barrios MD;  [facility];  Service: Orthopedics    SALPINGECTOMY Bilateral 1/12/2016    Procedure: REMOVAL OF BOTH FALLOPIAN TUBES;  Surgeon: Melissa Cannon DO;  [facility];  Service:     TUBAL LIGATION       Social History     Substance and Sexual Activity   Alcohol Use Yes    Alcohol/week: 2 0 standard drinks    Types: 2 Glasses of wine per week    Frequency: 2-3 times a week    Drinks per session: 1 or 2    Comment: occasional alcohol use     Social History     Substance and Sexual Activity   Drug Use Never     Social History     Tobacco Use   Smoking Status Never Smoker   Smokeless Tobacco Never Used       Family History:  Family History   Problem Relation Age of Onset    Diabetes Mother     Uterine cancer Mother     Obesity Mother     COPD Father     Hypothyroidism Sister     Scleroderma Sister     Atrial fibrillation Brother     Scleroderma Brother     Heart failure Maternal Grandmother     Heart failure Maternal Grandfather     Heart attack Paternal Grandmother     No Known Problems Sister     No Known Problems Daughter     No Known Problems Son     No Known Problems Paternal Grandfather     No Known Problems Daughter          Meds/Allergies     Prior to Admission medications    Medication Sig Start Date End Date Taking?  Authorizing Provider   atorvastatin (LIPITOR) 20 mg tablet Take 1 tablet (20 mg total) by mouth daily 12/23/20  Yes Christa Espinal MD   Calcium Carbonate-Vit D-Min (CALCIUM 1200 PO) Take by mouth   Yes Historical Provider, MD   Cholecalciferol (VITAMIN D3 PO) Take by mouth   Yes Historical Provider, MD   Cranberry-Vit C-Lactobacillus (CRANBERRY/PROBIOTIC/VIT C) 450-30 MG TABS Take 1 tablet by mouth daily   Yes Historical Provider, MD   estradiol (ESTRACE) 0 5 MG tablet Take 1 tablet (0 5 mg total) by mouth daily 10/6/20  Yes Raza Pritchard DO   metoprolol succinate (TOPROL-XL) 50 mg 24 hr tablet Take 1 tablet (50 mg total) by mouth daily 3/4/21  Yes Jeana Felix MD   omeprazole (PriLOSEC OTC) 20 MG tablet Take 1 tablet (20 mg total) by mouth daily 3/6/20  Yes Jeana Felix MD   progesterone (PROMETRIUM) 100 MG capsule Take 1 capsule (100 mg total) by mouth daily at bedtime 10/6/20  Yes Darcy Álvarez,    fluocinonide (LIDEX) 0 05 % ointment Apply topically 2 (two) times a day  Patient not taking: Reported on 4/15/2021 9/29/20 4/15/21  Bridger Canal, DPM       Allergies   Allergen Reactions    Levaquin [Levofloxacin] Anaphylaxis and Swelling     L         Vitals:    04/15/21 0754   BP: 130/84   BP Location: Right arm   Patient Position: Sitting   Cuff Size: Standard   Pulse: 78   Temp: 99 1 °F (37 3 °C)   TempSrc: Temporal   SpO2: 96%   Weight: 71 7 kg (158 lb)   Height: 5' 2" (1 575 m)       Body mass index is 28 9 kg/m²  3 pound weight gain can compared to approximately 6-1/2 months ago  Physical Exam:    General Appearance:  Alert, cooperative, no distress, appears stated age and is mildly to moderately overweight     Head:  Normocephalic, without obvious abnormality, atraumatic   Eyes:  PERRL, conjunctiva/corneas clear, EOM's intact,   both eyes   Ears:  Normal TM's and external ear canals, both ears   Nose: Nares normal, septum midline, mucosa normal, no drainage or sinus tenderness   Throat: Lips, mucosa, and tongue normal; teeth and gums normal   Neck: Supple, symmetrical, trachea midline, no adenopathy, thyroid: not enlarged, symmetric, no tenderness/mass/nodules, no carotid bruit or JVD   Back:   Symmetric, no curvature, ROM normal, no CVA tenderness   Lungs:   Clear to auscultation bilaterally, respirations unlabored   Chest Wall:  No tenderness or deformity   Heart:  Regular rate and rhythm, S1, S2 normal, no murmur, rub or gallop   Abdomen:   Soft, non-tender, bowel sounds active all four quadrants,  no masses, no organomegaly with mild to moderate obesity noted  Extremities: Extremities normal, atraumatic, no cyanosis or edema   Pulses: 2+ and symmetric   Skin: Skin showed normal color, texture, turgor and no rashes or lesions   Lymph nodes: Cervical, supraclavicular, and axillary nodes normal   Neurologic: Normal         Cardiographics    ECG 04/15/21:    Normal sinus rhythm at 78 bpm  Normal ECG   Suppressed PACs compared to 09/30/2020    24 hour Holter monitor 03/16/2021:    Normal sinus rhythm with average heart rate 81 bpm   309 PACs with 550 episodes of atrial bigeminy but no atrial fibrillation, flutter, SVT  No important bradycardia or pauses and no symptoms  One PVC for the entire recording        IMAGING:    No Chest XR results available for this patient            LAB REVIEW:      Lab Results   Component Value Date    SODIUM 138 09/03/2020    K 4 7 09/03/2020     (H) 09/03/2020    CO2 24 09/03/2020    ANIONGAP 8 03/19/2015    BUN 16 09/03/2020    CREATININE 0 96 09/03/2020    GLUCOSE 83 03/19/2015    GLUF 91 09/03/2020    CALCIUM 9 6 09/03/2020    AST 16 09/03/2020    ALT 25 09/03/2020    ALKPHOS 47 09/03/2020    PROT 7 7 03/19/2015    BILITOT 0 59 03/19/2015    EGFR 68 09/03/2020     Lab Results   Component Value Date    CHOLESTEROL 178 09/03/2020    CHOLESTEROL 165 08/27/2019    CHOLESTEROL 154 08/07/2018     Lab Results   Component Value Date    HDL 69 09/03/2020    HDL 62 (H) 08/27/2019    HDL 62 (H) 08/07/2018       Lab Results   Component Value Date    LDLCALC 84 09/03/2020    LDLCALC 66 08/27/2019    LDLCALC 66 08/07/2018     No components found for:  [facility]  Lab Results   Component Value Date    TRIG 124 09/03/2020    TRIG 186 (H) 08/27/2019    TRIG 130 08/07/2018               Scarlett Landin MD

## 2021-04-15 NOTE — PATIENT INSTRUCTIONS
1  Continue current management and medication  2  We have ordered a lipid panel and hemoglobin A1c for follow-up and screening  3  Cardiology follow-up approximately six months with EKG

## 2021-04-27 ENCOUNTER — APPOINTMENT (OUTPATIENT)
Dept: LAB | Facility: CLINIC | Age: 56
End: 2021-04-27

## 2021-04-27 DIAGNOSIS — E78.2 MIXED DYSLIPIDEMIA: ICD-10-CM

## 2021-04-27 DIAGNOSIS — E66.3 OVERWEIGHT: ICD-10-CM

## 2021-04-27 LAB
CHOLEST SERPL-MCNC: 186 MG/DL (ref 50–200)
EST. AVERAGE GLUCOSE BLD GHB EST-MCNC: 103 MG/DL
HBA1C MFR BLD: 5.2 %
HDLC SERPL-MCNC: 79 MG/DL
LDLC SERPL CALC-MCNC: 87 MG/DL (ref 0–100)
NONHDLC SERPL-MCNC: 107 MG/DL
TRIGL SERPL-MCNC: 102 MG/DL

## 2021-04-27 PROCEDURE — 80061 LIPID PANEL: CPT

## 2021-04-27 PROCEDURE — 36415 COLL VENOUS BLD VENIPUNCTURE: CPT

## 2021-04-27 PROCEDURE — 83036 HEMOGLOBIN GLYCOSYLATED A1C: CPT

## 2021-04-28 ENCOUNTER — TELEPHONE (OUTPATIENT)
Dept: CARDIOLOGY CLINIC | Facility: CLINIC | Age: 56
End: 2021-04-28

## 2021-04-28 NOTE — TELEPHONE ENCOUNTER
----- Message from Sania Murphy MD sent at 4/28/2021  8:36 AM EDT -----  Notify patient that blood work from one day ago was excellent with normal hemoglobin A1c and lipid panel

## 2021-05-24 DIAGNOSIS — M25.50 ARTHRALGIA, UNSPECIFIED JOINT: Primary | ICD-10-CM

## 2021-05-24 RX ORDER — MELOXICAM 15 MG/1
15 TABLET ORAL DAILY
Qty: 90 TABLET | Refills: 3 | Status: SHIPPED | OUTPATIENT
Start: 2021-05-24 | End: 2022-06-27

## 2021-07-15 ENCOUNTER — TELEPHONE (OUTPATIENT)
Dept: FAMILY MEDICINE CLINIC | Facility: MEDICAL CENTER | Age: 56
End: 2021-07-15

## 2021-07-15 DIAGNOSIS — Z11.52 ENCOUNTER FOR SCREENING FOR COVID-19: Primary | ICD-10-CM

## 2021-07-15 PROCEDURE — U0003 INFECTIOUS AGENT DETECTION BY NUCLEIC ACID (DNA OR RNA); SEVERE ACUTE RESPIRATORY SYNDROME CORONAVIRUS 2 (SARS-COV-2) (CORONAVIRUS DISEASE [COVID-19]), AMPLIFIED PROBE TECHNIQUE, MAKING USE OF HIGH THROUGHPUT TECHNOLOGIES AS DESCRIBED BY CMS-2020-01-R: HCPCS | Performed by: FAMILY MEDICINE

## 2021-07-15 PROCEDURE — U0005 INFEC AGEN DETEC AMPLI PROBE: HCPCS | Performed by: FAMILY MEDICINE

## 2021-07-15 NOTE — TELEPHONE ENCOUNTER
Pt called with sx for 2 days of st, has white spots in back of throat, headache, nausea, and achiness  Pt isn't sure she has a temp or not, as she is at work

## 2021-07-15 NOTE — TELEPHONE ENCOUNTER
Patient returned call  Covid testing ordered, patient will go to care now to have test completed  If neg would like to come in for an apt

## 2021-09-03 ENCOUNTER — OFFICE VISIT (OUTPATIENT)
Dept: FAMILY MEDICINE CLINIC | Facility: MEDICAL CENTER | Age: 56
End: 2021-09-03
Payer: COMMERCIAL

## 2021-09-03 VITALS
WEIGHT: 158.8 LBS | SYSTOLIC BLOOD PRESSURE: 128 MMHG | DIASTOLIC BLOOD PRESSURE: 84 MMHG | HEART RATE: 79 BPM | OXYGEN SATURATION: 98 % | BODY MASS INDEX: 29.22 KG/M2 | TEMPERATURE: 98 F | HEIGHT: 62 IN

## 2021-09-03 DIAGNOSIS — M25.50 ARTHRALGIA, UNSPECIFIED JOINT: Primary | ICD-10-CM

## 2021-09-03 DIAGNOSIS — Z86.79 HISTORY OF PAROXYSMAL SUPRAVENTRICULAR TACHYCARDIA: ICD-10-CM

## 2021-09-03 DIAGNOSIS — E78.00 PURE HYPERCHOLESTEROLEMIA: ICD-10-CM

## 2021-09-03 DIAGNOSIS — K21.9 GASTROESOPHAGEAL REFLUX DISEASE WITHOUT ESOPHAGITIS: ICD-10-CM

## 2021-09-03 DIAGNOSIS — Z13.1 SCREENING FOR DIABETES MELLITUS: ICD-10-CM

## 2021-09-03 DIAGNOSIS — Z13.29 SCREENING FOR THYROID DISORDER: ICD-10-CM

## 2021-09-03 PROCEDURE — 99213 OFFICE O/P EST LOW 20 MIN: CPT | Performed by: FAMILY MEDICINE

## 2021-09-03 PROCEDURE — 99396 PREV VISIT EST AGE 40-64: CPT | Performed by: FAMILY MEDICINE

## 2021-09-03 NOTE — ASSESSMENT & PLAN NOTE
She has arthralgias  She takes Mobic and she finds that it makes a lot better  So she takes it every day  Continue taking it  She will be watching her kidney function  And staying well hydrated

## 2021-09-03 NOTE — ASSESSMENT & PLAN NOTE
Taking PPI  Has seen GI    Had esophageal stricture was dilated two years ago  Doing well now      Continue PPI

## 2021-09-03 NOTE — PROGRESS NOTES
Assessment/Plan:    Arthralgia  She has arthralgias  She takes Mobic and she finds that it makes a lot better  So she takes it every day  Continue taking it  She will be watching her kidney function  And staying well hydrated  Gastroesophageal reflux disease without esophagitis  Taking PPI  Has seen GI    Had esophageal stricture was dilated two years ago  Doing well now  Continue PPI        History of paroxysmal supraventricular tachycardia  Patient is currently taking metoprolol and this prevent symptoms  Continue metoprolol, continue routine follow-up  Pure hypercholesterolemia  Patient currently takes atorvastatin 20 mg  She has had good response  Will check lipid profile, continue atorvastatin, continue low-fat diet  Problem List Items Addressed This Visit        Digestive    Gastroesophageal reflux disease without esophagitis     Taking PPI  Has seen GI    Had esophageal stricture was dilated two years ago  Doing well now  Continue PPI             Relevant Orders    CBC and differential       Other    History of paroxysmal supraventricular tachycardia     Patient is currently taking metoprolol and this prevent symptoms  Continue metoprolol, continue routine follow-up  Pure hypercholesterolemia     Patient currently takes atorvastatin 20 mg  She has had good response  Will check lipid profile, continue atorvastatin, continue low-fat diet  Relevant Orders    Lipid Panel with Direct LDL reflex    Arthralgia - Primary     She has arthralgias  She takes Mobic and she finds that it makes a lot better  So she takes it every day  Continue taking it  She will be watching her kidney function  And staying well hydrated           Relevant Orders    CBC and differential      Other Visit Diagnoses     Screening for diabetes mellitus        Relevant Orders    Comprehensive metabolic panel    Screening for thyroid disorder        Relevant Orders    TSH, 3rd generation with Free T4 reflex            Subjective:      Patient ID: Rich Jones is a 49 y o  female  She is in medical assistant to a neurologist   She is  and she has kids  She is up-to-date with colorectal cancer screening  She has an order for mammogram and schedule one today  The following portions of the patient's history were reviewed and updated as appropriate: allergies, current medications, past family history, past medical history, past social history, past surgical history and problem list     Review of Systems   Constitutional: Negative for activity change, appetite change, fatigue and fever  HENT: Negative for congestion, ear pain, hearing loss, nosebleeds, postnasal drip, rhinorrhea and trouble swallowing  Eyes: Negative for photophobia, pain, redness and visual disturbance  Respiratory: Negative for cough, chest tightness, shortness of breath and wheezing  Cardiovascular: Negative for chest pain and palpitations  Gastrointestinal: Negative for abdominal pain, blood in stool, constipation, diarrhea, nausea and vomiting  Endocrine: Negative for cold intolerance, heat intolerance, polydipsia, polyphagia and polyuria  Genitourinary: Negative for difficulty urinating, dyspareunia, dysuria, flank pain, frequency, menstrual problem, pelvic pain, urgency, vaginal bleeding and vaginal discharge  Musculoskeletal: Negative for arthralgias, gait problem, joint swelling and myalgias  Skin: Negative for rash and wound  Neurological: Negative for dizziness, tremors, seizures, syncope, speech difficulty, weakness, light-headedness and headaches  Psychiatric/Behavioral: Negative for agitation, decreased concentration, dysphoric mood, sleep disturbance and suicidal ideas  The patient is not nervous/anxious            Objective:      /84 (BP Location: Left arm, Patient Position: Sitting, Cuff Size: Large)   Pulse 79   Temp 98 °F (36 7 °C)   Ht 5' 2" (1 575 m)   Wt 72 kg (158 lb 12 8 oz)   SpO2 98%   BMI 29 04 kg/m²          Physical Exam  Vitals and nursing note reviewed  Constitutional:       Appearance: Normal appearance  She is well-developed  HENT:      Head: Normocephalic  Right Ear: Hearing, ear canal and external ear normal       Left Ear: Hearing, tympanic membrane, ear canal and external ear normal       Nose: Nose normal  No mucosal edema or rhinorrhea  Mouth/Throat:      Mouth: Mucous membranes are moist       Pharynx: Uvula midline  No oropharyngeal exudate  Eyes:      General: Lids are normal       Conjunctiva/sclera: Conjunctivae normal       Pupils: Pupils are equal, round, and reactive to light  Neck:      Thyroid: No thyroid mass or thyromegaly  Vascular: No carotid bruit  Cardiovascular:      Rate and Rhythm: Normal rate and regular rhythm  Pulses: Normal pulses  Heart sounds: Normal heart sounds, S1 normal and S2 normal  No murmur heard  No gallop  Pulmonary:      Effort: Pulmonary effort is normal       Breath sounds: Normal breath sounds  No wheezing or rales  Abdominal:      General: Bowel sounds are normal       Palpations: Abdomen is soft  Tenderness: There is no abdominal tenderness  Hernia: No hernia is present  Musculoskeletal:         General: Normal range of motion  Cervical back: Normal range of motion and neck supple  Lymphadenopathy:      Cervical: No cervical adenopathy  Skin:     General: Skin is warm and dry  Findings: No rash  Neurological:      General: No focal deficit present  Mental Status: She is oriented to person, place, and time  Cranial Nerves: No cranial nerve deficit  Sensory: No sensory deficit  Coordination: Coordination normal    Psychiatric:         Mood and Affect: Mood normal          Speech: Speech normal          Behavior: Behavior normal  Behavior is cooperative  Thought Content:  Thought content normal          Judgment: Judgment normal

## 2021-10-07 DIAGNOSIS — N95.1 SYMPTOMATIC MENOPAUSAL OR FEMALE CLIMACTERIC STATES: ICD-10-CM

## 2021-10-11 ENCOUNTER — APPOINTMENT (OUTPATIENT)
Dept: LAB | Facility: CLINIC | Age: 56
End: 2021-10-11
Payer: COMMERCIAL

## 2021-10-11 DIAGNOSIS — M25.50 ARTHRALGIA, UNSPECIFIED JOINT: ICD-10-CM

## 2021-10-11 DIAGNOSIS — Z13.1 SCREENING FOR DIABETES MELLITUS: ICD-10-CM

## 2021-10-11 DIAGNOSIS — Z13.29 SCREENING FOR THYROID DISORDER: ICD-10-CM

## 2021-10-11 DIAGNOSIS — K21.9 GASTROESOPHAGEAL REFLUX DISEASE WITHOUT ESOPHAGITIS: ICD-10-CM

## 2021-10-11 DIAGNOSIS — E78.00 PURE HYPERCHOLESTEROLEMIA: ICD-10-CM

## 2021-10-11 LAB
ALBUMIN SERPL BCP-MCNC: 3.6 G/DL (ref 3.5–5)
ALP SERPL-CCNC: 52 U/L (ref 46–116)
ALT SERPL W P-5'-P-CCNC: 30 U/L (ref 12–78)
ANION GAP SERPL CALCULATED.3IONS-SCNC: 4 MMOL/L (ref 4–13)
AST SERPL W P-5'-P-CCNC: 21 U/L (ref 5–45)
BASOPHILS # BLD AUTO: 0.04 THOUSANDS/ΜL (ref 0–0.1)
BASOPHILS NFR BLD AUTO: 1 % (ref 0–1)
BILIRUB SERPL-MCNC: 0.57 MG/DL (ref 0.2–1)
BUN SERPL-MCNC: 18 MG/DL (ref 5–25)
CALCIUM SERPL-MCNC: 9.3 MG/DL (ref 8.3–10.1)
CHLORIDE SERPL-SCNC: 105 MMOL/L (ref 100–108)
CHOLEST SERPL-MCNC: 149 MG/DL (ref 50–200)
CO2 SERPL-SCNC: 26 MMOL/L (ref 21–32)
CREAT SERPL-MCNC: 0.97 MG/DL (ref 0.6–1.3)
EOSINOPHIL # BLD AUTO: 0.12 THOUSAND/ΜL (ref 0–0.61)
EOSINOPHIL NFR BLD AUTO: 2 % (ref 0–6)
ERYTHROCYTE [DISTWIDTH] IN BLOOD BY AUTOMATED COUNT: 12 % (ref 11.6–15.1)
GFR SERPL CREATININE-BSD FRML MDRD: 66 ML/MIN/1.73SQ M
GLUCOSE P FAST SERPL-MCNC: 91 MG/DL (ref 65–99)
HCT VFR BLD AUTO: 38.3 % (ref 34.8–46.1)
HDLC SERPL-MCNC: 54 MG/DL
HGB BLD-MCNC: 12.7 G/DL (ref 11.5–15.4)
IMM GRANULOCYTES # BLD AUTO: 0.03 THOUSAND/UL (ref 0–0.2)
IMM GRANULOCYTES NFR BLD AUTO: 1 % (ref 0–2)
LDLC SERPL CALC-MCNC: 50 MG/DL (ref 0–100)
LYMPHOCYTES # BLD AUTO: 2.07 THOUSANDS/ΜL (ref 0.6–4.47)
LYMPHOCYTES NFR BLD AUTO: 32 % (ref 14–44)
MCH RBC QN AUTO: 31.6 PG (ref 26.8–34.3)
MCHC RBC AUTO-ENTMCNC: 33.2 G/DL (ref 31.4–37.4)
MCV RBC AUTO: 95 FL (ref 82–98)
MONOCYTES # BLD AUTO: 0.45 THOUSAND/ΜL (ref 0.17–1.22)
MONOCYTES NFR BLD AUTO: 7 % (ref 4–12)
NEUTROPHILS # BLD AUTO: 3.86 THOUSANDS/ΜL (ref 1.85–7.62)
NEUTS SEG NFR BLD AUTO: 57 % (ref 43–75)
NRBC BLD AUTO-RTO: 0 /100 WBCS
PLATELET # BLD AUTO: 272 THOUSANDS/UL (ref 149–390)
PMV BLD AUTO: 11.1 FL (ref 8.9–12.7)
POTASSIUM SERPL-SCNC: 3.7 MMOL/L (ref 3.5–5.3)
PROT SERPL-MCNC: 7.5 G/DL (ref 6.4–8.2)
RBC # BLD AUTO: 4.02 MILLION/UL (ref 3.81–5.12)
SODIUM SERPL-SCNC: 135 MMOL/L (ref 136–145)
TRIGL SERPL-MCNC: 224 MG/DL
TSH SERPL DL<=0.05 MIU/L-ACNC: 2.28 UIU/ML (ref 0.36–3.74)
WBC # BLD AUTO: 6.57 THOUSAND/UL (ref 4.31–10.16)

## 2021-10-11 PROCEDURE — 80053 COMPREHEN METABOLIC PANEL: CPT

## 2021-10-11 PROCEDURE — 84443 ASSAY THYROID STIM HORMONE: CPT

## 2021-10-11 PROCEDURE — 80061 LIPID PANEL: CPT

## 2021-10-11 PROCEDURE — 36415 COLL VENOUS BLD VENIPUNCTURE: CPT

## 2021-10-11 PROCEDURE — 85025 COMPLETE CBC W/AUTO DIFF WBC: CPT

## 2021-10-11 RX ORDER — ESTRADIOL 0.5 MG/1
TABLET ORAL
Qty: 90 TABLET | Refills: 0 | Status: SHIPPED | OUTPATIENT
Start: 2021-10-11 | End: 2022-01-10

## 2021-10-11 RX ORDER — PROGESTERONE 100 MG/1
CAPSULE ORAL
Qty: 90 CAPSULE | Refills: 0 | Status: SHIPPED | OUTPATIENT
Start: 2021-10-11 | End: 2022-01-10

## 2021-10-15 ENCOUNTER — OFFICE VISIT (OUTPATIENT)
Dept: CARDIOLOGY CLINIC | Facility: CLINIC | Age: 56
End: 2021-10-15
Payer: COMMERCIAL

## 2021-10-15 VITALS
BODY MASS INDEX: 29.63 KG/M2 | DIASTOLIC BLOOD PRESSURE: 97 MMHG | TEMPERATURE: 99.2 F | HEIGHT: 62 IN | HEART RATE: 68 BPM | WEIGHT: 161 LBS | SYSTOLIC BLOOD PRESSURE: 160 MMHG | OXYGEN SATURATION: 98 %

## 2021-10-15 DIAGNOSIS — Z86.79 HISTORY OF PSVT (PAROXYSMAL SUPRAVENTRICULAR TACHYCARDIA): ICD-10-CM

## 2021-10-15 DIAGNOSIS — Z86.79 HISTORY OF PAROXYSMAL SUPRAVENTRICULAR TACHYCARDIA: ICD-10-CM

## 2021-10-15 DIAGNOSIS — I10 UNCONTROLLED HYPERTENSION: Primary | ICD-10-CM

## 2021-10-15 DIAGNOSIS — E78.2 MIXED DYSLIPIDEMIA: ICD-10-CM

## 2021-10-15 DIAGNOSIS — E66.3 OVERWEIGHT: ICD-10-CM

## 2021-10-15 DIAGNOSIS — R00.2 INTERMITTENT PALPITATIONS: ICD-10-CM

## 2021-10-15 DIAGNOSIS — I49.8 ATRIAL BIGEMINY: ICD-10-CM

## 2021-10-15 PROCEDURE — 99215 OFFICE O/P EST HI 40 MIN: CPT | Performed by: INTERNAL MEDICINE

## 2021-10-15 PROCEDURE — 93000 ELECTROCARDIOGRAM COMPLETE: CPT | Performed by: INTERNAL MEDICINE

## 2021-10-26 DIAGNOSIS — I10 UNCONTROLLED HYPERTENSION: Primary | ICD-10-CM

## 2021-10-26 RX ORDER — OLMESARTAN MEDOXOMIL 20 MG/1
20 TABLET ORAL DAILY
Qty: 30 TABLET | Refills: 5 | Status: SHIPPED | OUTPATIENT
Start: 2021-10-26 | End: 2022-05-02

## 2021-12-15 ENCOUNTER — IMMUNIZATIONS (OUTPATIENT)
Dept: FAMILY MEDICINE CLINIC | Facility: HOSPITAL | Age: 56
End: 2021-12-15

## 2021-12-15 DIAGNOSIS — Z23 ENCOUNTER FOR IMMUNIZATION: Primary | ICD-10-CM

## 2021-12-15 PROCEDURE — 0064A COVID-19 MODERNA VACC 0.25 ML BOOSTER: CPT

## 2021-12-15 PROCEDURE — 91306 COVID-19 MODERNA VACC 0.25 ML BOOSTER: CPT

## 2021-12-20 ENCOUNTER — ANNUAL EXAM (OUTPATIENT)
Dept: OBGYN CLINIC | Facility: CLINIC | Age: 56
End: 2021-12-20
Payer: COMMERCIAL

## 2021-12-20 VITALS
SYSTOLIC BLOOD PRESSURE: 124 MMHG | WEIGHT: 160 LBS | DIASTOLIC BLOOD PRESSURE: 72 MMHG | HEIGHT: 62 IN | BODY MASS INDEX: 29.44 KG/M2

## 2021-12-20 DIAGNOSIS — Z01.419 WOMEN'S ANNUAL ROUTINE GYNECOLOGICAL EXAMINATION: Primary | ICD-10-CM

## 2021-12-20 PROCEDURE — 99396 PREV VISIT EST AGE 40-64: CPT | Performed by: OBSTETRICS & GYNECOLOGY

## 2022-01-10 ENCOUNTER — TELEPHONE (OUTPATIENT)
Dept: OBGYN CLINIC | Facility: CLINIC | Age: 57
End: 2022-01-10

## 2022-01-10 DIAGNOSIS — N95.1 SYMPTOMATIC MENOPAUSAL OR FEMALE CLIMACTERIC STATES: ICD-10-CM

## 2022-01-10 RX ORDER — PROGESTERONE 100 MG/1
1 CAPSULE ORAL
Qty: 90 CAPSULE | Refills: 3 | Status: SHIPPED | OUTPATIENT
Start: 2022-01-10

## 2022-01-10 RX ORDER — ESTRADIOL 0.5 MG/1
0.5 TABLET ORAL DAILY
Qty: 90 TABLET | Refills: 3 | Status: SHIPPED | OUTPATIENT
Start: 2022-01-10

## 2022-01-10 RX ORDER — PROGESTERONE 100 MG/1
CAPSULE ORAL
Qty: 90 CAPSULE | Refills: 0 | Status: SHIPPED | OUTPATIENT
Start: 2022-01-10 | End: 2022-01-10 | Stop reason: SDUPTHER

## 2022-01-10 RX ORDER — ESTRADIOL 0.5 MG/1
TABLET ORAL
Qty: 90 TABLET | Refills: 0 | Status: SHIPPED | OUTPATIENT
Start: 2022-01-10 | End: 2022-01-10 | Stop reason: SDUPTHER

## 2022-01-10 NOTE — TELEPHONE ENCOUNTER
Pt called stating she needs her Estradiol and progesterone sent to [facility]mail order not [facility]

## 2022-01-14 ENCOUNTER — HOSPITAL ENCOUNTER (OUTPATIENT)
Dept: RADIOLOGY | Facility: MEDICAL CENTER | Age: 57
Discharge: HOME | End: 2022-01-14
Payer: COMMERCIAL

## 2022-01-14 VITALS — WEIGHT: 160 LBS | HEIGHT: 62 IN | BODY MASS INDEX: 29.44 KG/M2

## 2022-01-14 DIAGNOSIS — Z12.31 SCREENING MAMMOGRAM FOR HIGH-RISK PATIENT: ICD-10-CM

## 2022-01-14 PROCEDURE — 77063 BREAST TOMOSYNTHESIS BI: CPT

## 2022-01-14 PROCEDURE — 77067 SCR MAMMO BI INCL CAD: CPT

## 2022-02-09 ENCOUNTER — CLINICAL SUPPORT (OUTPATIENT)
Dept: FAMILY MEDICINE CLINIC | Facility: MEDICAL CENTER | Age: 57
End: 2022-02-09
Payer: COMMERCIAL

## 2022-02-09 ENCOUNTER — TELEPHONE (OUTPATIENT)
Dept: FAMILY MEDICINE CLINIC | Facility: MEDICAL CENTER | Age: 57
End: 2022-02-09

## 2022-02-09 DIAGNOSIS — Z20.822 EXPOSURE TO COVID-19 VIRUS: Primary | ICD-10-CM

## 2022-02-09 LAB
SARS-COV-2 AG UPPER RESP QL IA: NEGATIVE
VALID CONTROL: NORMAL

## 2022-02-09 PROCEDURE — 87811 SARS-COV-2 COVID19 W/OPTIC: CPT

## 2022-02-09 NOTE — TELEPHONE ENCOUNTER
Pt is a  employee  She is c/o a sore throat, h/a, chills  They told her to call her pcp to see if she needs to be tested  Spoke to clinical and scheduled pt

## 2022-02-09 NOTE — PROGRESS NOTES
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

## 2022-02-10 ENCOUNTER — CLINICAL SUPPORT (OUTPATIENT)
Dept: FAMILY MEDICINE CLINIC | Facility: MEDICAL CENTER | Age: 57
End: 2022-02-10

## 2022-02-10 DIAGNOSIS — Z20.822 SUSPECTED COVID-19 VIRUS INFECTION: Primary | ICD-10-CM

## 2022-02-10 PROCEDURE — U0003 INFECTIOUS AGENT DETECTION BY NUCLEIC ACID (DNA OR RNA); SEVERE ACUTE RESPIRATORY SYNDROME CORONAVIRUS 2 (SARS-COV-2) (CORONAVIRUS DISEASE [COVID-19]), AMPLIFIED PROBE TECHNIQUE, MAKING USE OF HIGH THROUGHPUT TECHNOLOGIES AS DESCRIBED BY CMS-2020-01-R: HCPCS

## 2022-02-10 PROCEDURE — U0005 INFEC AGEN DETEC AMPLI PROBE: HCPCS

## 2022-02-11 ENCOUNTER — TELEPHONE (OUTPATIENT)
Dept: FAMILY MEDICINE CLINIC | Facility: MEDICAL CENTER | Age: 57
End: 2022-02-11

## 2022-02-11 LAB — SARS-COV-2 RNA RESP QL NAA+PROBE: POSITIVE

## 2022-02-11 NOTE — TELEPHONE ENCOUNTER
----- Message from [address] [place] [address] sent at 2/11/2022 12:21 PM EST -----  Please call patient with positive result

## 2022-02-14 ENCOUNTER — TELEPHONE (OUTPATIENT)
Dept: FAMILY MEDICINE CLINIC | Facility: MEDICAL CENTER | Age: 57
End: 2022-02-14

## 2022-02-14 NOTE — TELEPHONE ENCOUNTER
Pt is taking Musinex for her Covid symptoms  Today she has clogged ears and dizziness  Anything else she can use to relive these symptoms  She says her voice is better but still has runny nose

## 2022-03-06 DIAGNOSIS — I47.1 PSVT (PAROXYSMAL SUPRAVENTRICULAR TACHYCARDIA) (HCC): ICD-10-CM

## 2022-03-08 RX ORDER — METOPROLOL SUCCINATE 50 MG/1
TABLET, EXTENDED RELEASE ORAL
Qty: 90 TABLET | Refills: 0 | Status: SHIPPED | OUTPATIENT
Start: 2022-03-08 | End: 2022-06-06

## 2022-03-22 ENCOUNTER — OFFICE VISIT (OUTPATIENT)
Dept: CARDIOLOGY CLINIC | Facility: CLINIC | Age: 57
End: 2022-03-22
Payer: COMMERCIAL

## 2022-03-22 VITALS
WEIGHT: 162 LBS | TEMPERATURE: 98.4 F | BODY MASS INDEX: 29.81 KG/M2 | HEART RATE: 68 BPM | OXYGEN SATURATION: 97 % | DIASTOLIC BLOOD PRESSURE: 78 MMHG | HEIGHT: 62 IN | SYSTOLIC BLOOD PRESSURE: 118 MMHG

## 2022-03-22 DIAGNOSIS — Z86.79 HISTORY OF PSVT (PAROXYSMAL SUPRAVENTRICULAR TACHYCARDIA): ICD-10-CM

## 2022-03-22 DIAGNOSIS — I49.8 ATRIAL BIGEMINY: ICD-10-CM

## 2022-03-22 DIAGNOSIS — I10 UNCONTROLLED HYPERTENSION: ICD-10-CM

## 2022-03-22 DIAGNOSIS — E78.2 MIXED HYPERLIPIDEMIA: ICD-10-CM

## 2022-03-22 DIAGNOSIS — Z86.16 HISTORY OF COVID-19: ICD-10-CM

## 2022-03-22 DIAGNOSIS — I10 ESSENTIAL HYPERTENSION: Primary | ICD-10-CM

## 2022-03-22 DIAGNOSIS — E66.3 OVERWEIGHT: ICD-10-CM

## 2022-03-22 PROBLEM — I47.10 PSVT (PAROXYSMAL SUPRAVENTRICULAR TACHYCARDIA): Status: ACTIVE | Noted: 2022-03-22

## 2022-03-22 PROBLEM — I47.1 PSVT (PAROXYSMAL SUPRAVENTRICULAR TACHYCARDIA) (HCC): Status: ACTIVE | Noted: 2022-03-22

## 2022-03-22 PROCEDURE — 93000 ELECTROCARDIOGRAM COMPLETE: CPT | Performed by: INTERNAL MEDICINE

## 2022-03-22 PROCEDURE — 99214 OFFICE O/P EST MOD 30 MIN: CPT | Performed by: INTERNAL MEDICINE

## 2022-03-22 NOTE — PATIENT INSTRUCTIONS
1  Starting soon, measure blood pressures at home for four consecutive days, including a Saturday and Sunday, morning and evening, allowing for a 15-20 minute rest period  before taking the blood pressure, which should be done as sets of three readings one after the other 1-2 minutes apart, recorded on blood pressure data sheet given to you by Dr Deborah Dodd  2   Take blood pressures after resting for at least 15 minutes while seated in a chair or at a table with arm supported on arm rest or table  Do 3 readings, one after the other, both morning and evening for 4 consecutive days  3   Write down each and every reading with date and time and get list of readings to the office of Dr Deborah Dodd for his review  You may e-mail directly to Dr Deborah Dodd at:  [email or web address]  [email or web address] or send via the messaging feature in the [employer] lux   4   Our office will contact you with further instructions and the results of this review  5  Continue current medication  6  Cardiology follow-up approximately six months with ECG, lipids, CMP

## 2022-03-22 NOTE — PROGRESS NOTES
Office Cardiology Progress Note  Yi Castellano 49 y o  female MRN: [id number]  03/22/22  9:01 AM      ASSESSMENT:    1  Seemingly resolved uncontrolled essential hypertension on today's office visit with previous history of white coat hypertension followed by confirmed essential hypertension on home blood pressure measurements in October, 2021      2  Recurrent brief palpitations, with  previous flare up 3+ years  ago, and history of AVNRT/PSVT diagnosed in 2005  3  Mixed dyslipidemia with  recurrence of residual increase in triglycerides and  2 8% 10 year and previous 38 8% lifetime ASCVD risk  4  History of chest pain with normal nuclear stress test on 9/29/16   5  36 8 pound weight gain since January, 2015 and  17 pound weight gain in approximately 3+ years  6   Marked overweight with 7 pound weight gain in approximately  1-1/2+ years  7   Current suppression of mildly symptomatic atrial bigeminy/trigeminy,  as of today with previous brief palpitations  Benign Holter monitor 03/16/2021 with 309 PACs and 550 episodes of atrial bigeminy  8   Resolved cotton wool exudates on  previous eye examination and history of bilateral iritis,  treated with steroid eyedrops    9   Suppressed whooshing sensation in head with previous uncontrolled hypertension thought to be a possible  cause  10  Stable frequency of migraine headaches to once every two weeks  Most recent four day episode was several months ago  11  History of COVID-19 infection confirmed on 02/11/2022  Plan       Patient Instructions     1  Starting soon, measure blood pressures at home for four consecutive days, including a Saturday and Sunday, morning and evening, allowing for a 15-20 minute rest period  before taking the blood pressure, which should be done as sets of three readings one after the other 1-2 minutes apart, recorded on blood pressure data sheet given to you by Dr Shi Antonio    2   Take blood pressures after resting for at least 15 minutes while seated in a chair or at a table with arm supported on arm rest or table  Do 3 readings, one after the other, both morning and evening for 4 consecutive days  3   Write down each and every reading with date and time and get list of readings to the office of Dr Thalia Eason for his review  You may e-mail directly to Dr Thalia Eason at:  [email or web address] or send via the messaging feature in the [employer] lux   4   Our office will contact you with further instructions and the results of this review  5  Continue current medication  6  Cardiology follow-up approximately six months with ECG, lipids, CMP  HPI    This 49 y o  female  denies new cardiopulmonary and medical symptoms  This patient was noted to have a mildly elevated blood pressure of 136/89 in October, 2021 on home measurement  She was begun on olmesartan 20 milligrams daily at that time  The patient developed confirmed COVID-19 upper respiratory tract infection confirmed by PCR on 02/11/2022 with symptoms of sinus congestion, fatigue, and subsequent rapid heartbeat  She developed rash and rapid heartbeat after starting on a course of prednisone for two weeks and missed work for a total of six days  Since then, she has residual bedtime and early morning dry cough  The patient began with cardiovascular and core strengthening exercises, 45 minutes in duration prior to the COVID 19 infection and has since resumed those exercises in the past two weeks  Because of stress at her current job, 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     Essential hypertension Yes    Atrial bigeminy     History of PSVT (paroxysmal supraventricular tachycardia)     Mixed hyperlipidemia     Uncontrolled hypertension     Overweight     History of COVID-19         Review of Systems    All other systems negative, except as noted in history of present illness    Historical Information   Past Medical History:   Diagnosis Date    Abnormal Pap smear of cervix     in the past     Arthritis     Carpal tunnel syndrome     Right    Functional heart murmur in      GERD (gastroesophageal reflux disease)     no meds    Heart murmur     History of paroxysmal supraventricular tachycardia     History of urinary tract infection     recurrent in past     Hyperlipidemia     Hypertension     Miscarriage     Urinary tract infection     Uterine leiomyoma     had hysterectomy    Wears contact lenses     Wears glasses      Past Surgical History:   Procedure Laterality Date    HERNIA REPAIR      pt was 5months old    HYSTERECTOMY      LASER ABLATION N/A     vaginal lesion(s)    MOLE EXCISION      OOPHORECTOMY      OK ANKLE SCOPE,PLANTAR FASCIOTOMY Right 2017    Procedure: RELEASE FASCIA PLANTAR/FASCIOTOMY ENDOSCOPIC (EPF);   Surgeon: Ariadne Ashraf DPM;  Location: [facility];  Service: Podiatry    OK CYSTOURETHROSCOPY N/A 2016    Procedure: Susa Ovens;  Surgeon: Shauna Mariee DO;  [facility];  Service: Gynecology    OK LAPAROSCOPY W TOT HYSTERECTUTERUS <=250 GRAM  W TUBE/OVARY N/A 2016    Procedure: TOTAL LAPAROSCOPIC HYSTERECTOMY ;  Surgeon: Shauna Mariee DO;  [facility];  Service: Gynecology    OK PART EXCIS PLANTAR FASCIA Left 2019    Procedure: LEFT  INSTEP PLANTAR FASCIOTOMY;  Surgeon: Lilli Palacios DPM;  Location: [facility] OR;  Service: Podiatry    OK WRIST Rodrigo Beatrice LIG Right 2016    Procedure: ENDOSCOPIC CARPAL TUNNEL RELEASE;  Surgeon: Mick Tsang MD;  [facility];  Service: Orthopedics    SALPINGECTOMY Bilateral 2016    Procedure: REMOVAL OF BOTH FALLOPIAN TUBES;  Surgeon: Shauna Mariee DO;  [facility]; Service:     TUBAL LIGATION       Social History     Substance and Sexual Activity   Alcohol Use Yes    Alcohol/week: 2 0 standard drinks    Types: 2 Glasses of wine per week    Comment: occasional alcohol use     Social History     Substance and Sexual Activity   Drug Use Never     Social History     Tobacco Use   Smoking Status Never Smoker   Smokeless Tobacco Never Used       Family History:  Family History   Problem Relation Age of Onset    Diabetes Mother     Uterine cancer Mother     Obesity Mother    Familia Hernandez Cancer Mother     COPD Father     Hypothyroidism Sister     Scleroderma Sister     Atrial fibrillation Brother     Scleroderma Brother     Heart failure Maternal Grandmother     Heart failure Maternal Grandfather     Heart attack Paternal Grandmother     No Known Problems Sister     No Known Problems Daughter     No Known Problems Son     No Known Problems Paternal Grandfather     No Known Problems Daughter          Meds/Allergies     Prior to Admission medications    Medication Sig Start Date End Date Taking?  Authorizing Provider   atorvastatin (LIPITOR) 20 mg tablet TAKE ONE TABLET BY MOUTH EVERY DAY 12/28/21  Yes Hien Jiang MD   Calcium Carbonate-Vit D-Min (CALCIUM 1200 PO) Take by mouth   Yes Historical Provider, MD   Cholecalciferol (VITAMIN D3 PO) Take by mouth   Yes Historical Provider, MD   Cranberry-Vit C-Lactobacillus (CRANBERRY/PROBIOTIC/VIT C) 450-30 MG TABS Take 1 tablet by mouth daily   Yes Historical Provider, MD   estradiol (ESTRACE) 0 5 MG tablet Take 1 tablet (0 5 mg total) by mouth daily 1/10/22  Yes Darcy Álvarez DO   meloxicam (MOBIC) 15 mg tablet Take 1 tablet (15 mg total) by mouth daily 5/24/21  Yes Galina Lopez MD   metoprolol succinate (TOPROL-XL) 50 mg 24 hr tablet TAKE ONE TABLET BY MOUTH EVERY DAY 3/8/22  Yes Hien Jiang MD   olmesartan (BENICAR) 20 mg tablet Take 1 tablet (20 mg total) by mouth daily 10/26/21  Yes Hien Jiang MD   omeprazole (PriLOSEC OTC) 20 MG tablet Take 1 tablet (20 mg total) by mouth daily 3/6/20  Yes Fabian Hollins MD   Progesterone 100 MG CAPS Take 1 capsule by mouth daily at bedtime 1/10/22  Yes Carson Camarena DO   predniSONE 10 mg tablet See Note to Pharmacy  Patient not taking: Reported on 3/22/2022  2/14/22 3/22/22  Onelia Thompson MD       Allergies   Allergen Reactions    Levaquin [Levofloxacin] Anaphylaxis and Swelling     L         Vitals:    03/22/22 0758   BP: 118/78   BP Location: Right arm   Patient Position: Sitting   Cuff Size: Large   Pulse: 68   Temp: 98 4 °F (36 9 °C)   SpO2: 97%   Weight: 73 5 kg (162 lb)   Height: 5' 2" (1 575 m)       Body mass index is 29 63 kg/m²  1 pound weight gain in approximately six months and 7 pound weight gain in approximately 1-1/2+  years    Physical Exam:    General Appearance:  Alert, cooperative, no distress, appears stated age and is moderately to markedly overweight  Head:  Normocephalic, without obvious abnormality, atraumatic   Eyes:  PERRL, conjunctiva/corneas clear, EOM's intact,   both eyes   Ears:  Normal TM's and external ear canals, both ears   Nose: Nares normal, septum midline, mucosa normal, no drainage or sinus tenderness   Throat: Lips, mucosa, and tongue normal; teeth and gums normal   Neck: Supple, symmetrical, trachea midline, no adenopathy, thyroid: not enlarged, symmetric, no tenderness/mass/nodules, no carotid bruit or JVD   Back:   Symmetric, no curvature, ROM normal, no CVA tenderness   Lungs:   Clear to auscultation bilaterally, respirations unlabored   Chest Wall:  No tenderness or deformity   Heart:  Regular rate and rhythm, S1, S2 normal, no murmur, rub or gallop   Abdomen:   Soft, non-tender, bowel sounds active all four quadrants,  no masses, no organomegaly  Moderate abdominal obesity noted     Extremities: Extremities normal, atraumatic, no cyanosis or edema   Pulses: 2+ and symmetric   Skin: Skin showed normal color, texture, turgor and no rashes or lesions   Lymph nodes: Cervical, supraclavicular, and axillary nodes normal   Neurologic: Normal         Cardiographics    ECG 03/22/22:    Normal sinus rhythm at 68 bpm   Minimal voltage criteria for LVH  Poor R-wave progression  Suppressed atrial bigeminy with new poor R-wave progression and no other changes since 10/15/2021    IMAGING:    No Chest XR results available for this patient        Bilateral mammogram 01/14/2022:    No mammographic evidence of malignancy      LAB REVIEW:      Lab Results   Component Value Date    SODIUM 135 (L) 10/11/2021    K 3 7 10/11/2021     10/11/2021    CO2 26 10/11/2021    ANIONGAP 8 03/19/2015    BUN 18 10/11/2021    CREATININE 0 97 10/11/2021    GLUCOSE 83 03/19/2015    GLUF 91 10/11/2021    CALCIUM 9 3 10/11/2021    AST 21 10/11/2021    ALT 30 10/11/2021    ALKPHOS 52 10/11/2021    PROT 7 7 03/19/2015    BILITOT 0 59 03/19/2015    EGFR 66 10/11/2021     Lab Results   Component Value Date    CHOLESTEROL 149 10/11/2021    CHOLESTEROL 186 04/27/2021    CHOLESTEROL 178 09/03/2020     Lab Results   Component Value Date    HDL 54 10/11/2021    HDL 79 04/27/2021    HDL 69 09/03/2020       Lab Results   Component Value Date    LDLCALC 50 10/11/2021    LDLCALC 87 04/27/2021    LDLCALC 84 09/03/2020     No components found for: [facility]  Lab Results   Component Value Date    TRIG 224 (H) 10/11/2021    TRIG 102 04/27/2021    TRIG 124 09/03/2020     SARs-CoV -2 PCR 02/11/2022: Positive          Olena Sahu MD

## 2022-04-07 ENCOUNTER — APPOINTMENT (OUTPATIENT)
Dept: LAB | Facility: CLINIC | Age: 57
End: 2022-04-07

## 2022-04-07 DIAGNOSIS — Z00.8 HEALTH EXAMINATION IN POPULATION SURVEY: ICD-10-CM

## 2022-04-07 LAB
CHOLEST SERPL-MCNC: 169 MG/DL
EST. AVERAGE GLUCOSE BLD GHB EST-MCNC: 108 MG/DL
HBA1C MFR BLD: 5.4 %
HDLC SERPL-MCNC: 60 MG/DL
LDLC SERPL CALC-MCNC: 66 MG/DL (ref 0–100)
NONHDLC SERPL-MCNC: 109 MG/DL
TRIGL SERPL-MCNC: 213 MG/DL

## 2022-04-07 PROCEDURE — 83036 HEMOGLOBIN GLYCOSYLATED A1C: CPT

## 2022-04-07 PROCEDURE — 36415 COLL VENOUS BLD VENIPUNCTURE: CPT

## 2022-04-07 PROCEDURE — 80061 LIPID PANEL: CPT

## 2022-04-15 ENCOUNTER — OFFICE VISIT (OUTPATIENT)
Dept: URGENT CARE | Facility: MEDICAL CENTER | Age: 57
End: 2022-04-15
Payer: COMMERCIAL

## 2022-04-15 VITALS
HEIGHT: 62 IN | TEMPERATURE: 97.9 F | OXYGEN SATURATION: 99 % | BODY MASS INDEX: 29.44 KG/M2 | RESPIRATION RATE: 18 BRPM | HEART RATE: 80 BPM | WEIGHT: 160 LBS

## 2022-04-15 DIAGNOSIS — M26.609 TMJ DYSFUNCTION: Primary | ICD-10-CM

## 2022-04-15 PROCEDURE — G0382 LEV 3 HOSP TYPE B ED VISIT: HCPCS | Performed by: PHYSICIAN ASSISTANT

## 2022-04-15 PROCEDURE — S9083 URGENT CARE CENTER GLOBAL: HCPCS | Performed by: PHYSICIAN ASSISTANT

## 2022-04-15 NOTE — PATIENT INSTRUCTIONS
Apply moist heat as needed    Follow-up with [facility] for further evaluation if symptoms persist

## 2022-04-16 NOTE — PROGRESS NOTES
[facility]        NAME: Lexa Newby is a 49 y o  female  : 1965    MRN: [id number]  DATE: April 15, 2022  TIME: 8:13 PM    Assessment and Plan   TMJ dysfunction [M26 609]  1  TMJ dysfunction           Patient Instructions       Follow up with PCP in 3-5 days  Proceed to  ER if symptoms worsen  Chief Complaint     Chief Complaint   Patient presents with    Jaw Pain     patient states since friday she feels like her right jaw has been "out of alignment" was hit in the face with soccer ball approx 3 months ago on that side         History of Present Illness       Patient is here for evaluation of right sided jaw pain  Patient states that back in January she was hit on the right side of her head by a soccer ball  Patient states that she had discomfort and difficulty opening closer jaw for while and that resolved  Patient states that it started up again and she unsure if it was her ear that was bothering her      Review of Systems   Review of Systems   Constitutional: Negative  HENT: Negative for congestion, dental problem, drooling, ear discharge, ear pain, facial swelling, hearing loss, postnasal drip, rhinorrhea, sinus pressure, sinus pain, sore throat, trouble swallowing and voice change            Current Medications       Current Outpatient Medications:     atorvastatin (LIPITOR) 20 mg tablet, TAKE ONE TABLET BY MOUTH EVERY DAY, Disp: 90 tablet, Rfl: 3    Cholecalciferol (VITAMIN D3 PO), Take by mouth, Disp: , Rfl:     Cranberry-Vit C-Lactobacillus (CRANBERRY/PROBIOTIC/VIT C) 450-30 MG TABS, Take 1 tablet by mouth daily, Disp: , Rfl:     estradiol (ESTRACE) 0 5 MG tablet, Take 1 tablet (0 5 mg total) by mouth daily, Disp: 90 tablet, Rfl: 3    meloxicam (MOBIC) 15 mg tablet, Take 1 tablet (15 mg total) by mouth daily, Disp: 90 tablet, Rfl: 3    metoprolol succinate (TOPROL-XL) 50 mg 24 hr tablet, TAKE ONE TABLET BY MOUTH EVERY DAY, Disp: 90 tablet, Rfl: 0    omeprazole (PriLOSEC OTC) 20 MG tablet, Take 1 tablet (20 mg total) by mouth daily, Disp: 100 tablet, Rfl: 5    Progesterone 100 MG CAPS, Take 1 capsule by mouth daily at bedtime, Disp: 90 capsule, Rfl: 3    Calcium Carbonate-Vit D-Min (CALCIUM 1200 PO), Take by mouth (Patient not taking: Reported on 4/15/2022 ), Disp: , Rfl:     olmesartan (BENICAR) 20 mg tablet, Take 1 tablet (20 mg total) by mouth daily, Disp: 30 tablet, Rfl: 5    Current Allergies     Allergies as of 04/15/2022 - Reviewed 04/15/2022   Allergen Reaction Noted    Levaquin [levofloxacin] Anaphylaxis and Swelling 2015            The following portions of the patient's history were reviewed and updated as appropriate: allergies, current medications, past family history, past medical history, past social history, past surgical history and problem list      Past Medical History:   Diagnosis Date    Abnormal Pap smear of cervix     in the past     Arthritis     Carpal tunnel syndrome     Right    Functional heart murmur in      GERD (gastroesophageal reflux disease)     no meds    Heart murmur     History of paroxysmal supraventricular tachycardia     History of urinary tract infection     recurrent in past     Hyperlipidemia     Hypertension     Miscarriage     Urinary tract infection     Uterine leiomyoma     had hysterectomy    Wears contact lenses     Wears glasses        Past Surgical History:   Procedure Laterality Date    HERNIA REPAIR      pt was 5months old    HYSTERECTOMY      LASER ABLATION N/A     vaginal lesion(s)    MOLE EXCISION      OOPHORECTOMY      OR ANKLE SCOPE,PLANTAR FASCIOTOMY Right 2017    Procedure: RELEASE FASCIA PLANTAR/FASCIOTOMY ENDOSCOPIC (EPF);   Surgeon: Nicole Sanchez DPM;  [facility];  Service: Podiatry    OR CYSTOURETHROSCOPY N/A 2016    Procedure: Meghan Campbell;  Surgeon: Harrie Goodell, DO;  [facility];  Service: Gynecology    OR LAPAROSCOPY W TOT HYSTERECTUTERUS <=[address] TUBE/OVARY N/A 1/12/2016    Procedure: TOTAL LAPAROSCOPIC HYSTERECTOMY ;  Surgeon: Mabel Castillo DO;  [facility];  Service: Gynecology    KS PART EXCIS PLANTAR FASCIA Left 4/8/2019    Procedure: LEFT  INSTEP PLANTAR FASCIOTOMY;  Surgeon: Roxanne Ramires DPM;  [facility] MA[facility];  Service: Podiatry    [place] WRIST [place] LIG Right 1/29/2016    Procedure: ENDOSCOPIC CARPAL TUNNEL RELEASE;  Surgeon: Deb Galloway MD;  [facility];  Service: Orthopedics    SALPINGECTOMY Bilateral 1/12/2016    Procedure: REMOVAL OF BOTH FALLOPIAN TUBES;  Surgeon: Mabel Castillo DO;  [facility];  Service:     TUBAL LIGATION         Family History   Problem Relation Age of Onset    Diabetes Mother     Uterine cancer Mother     Obesity Mother     Cancer Mother     COPD Father     Hypothyroidism Sister     Scleroderma Sister     Atrial fibrillation Brother     Scleroderma Brother     Heart failure Maternal Grandmother     Heart failure Maternal Grandfather     Heart attack Paternal Grandmother     No Known Problems Sister     No Known Problems Daughter     No Known Problems Son     No Known Problems Paternal Grandfather     No Known Problems Daughter          Medications have been verified  Objective   Pulse 80   Temp 97 9 °F (36 6 °C)   Resp 18   Ht 5' 2" (1 575 m)   Wt 72 6 kg (160 lb)   SpO2 99%   BMI 29 26 kg/m²   No LMP recorded  Patient has had a hysterectomy  Physical Exam     Physical Exam  Vitals and nursing note reviewed  Constitutional:       General: She is not in acute distress  Appearance: Normal appearance  She is well-developed  She is not ill-appearing, toxic-appearing or diaphoretic  HENT:      Head: Normocephalic and atraumatic  Right Ear: Tympanic membrane and ear canal normal  No middle ear effusion  Tympanic membrane is not retracted  Left Ear: Tympanic membrane and ear canal normal   No middle ear effusion  Tympanic membrane is not retracted  Nose: No congestion or rhinorrhea  Mouth/Throat:      Mouth: Mucous membranes are moist       Pharynx: No oropharyngeal exudate or posterior oropharyngeal erythema  Comments: Discomfort and occasional popping on the right side of her jaw went opening closing her mouth  Eyes:      Extraocular Movements: Extraocular movements intact  Conjunctiva/sclera: Conjunctivae normal       Pupils: Pupils are equal, round, and reactive to light  Lymphadenopathy:      Cervical: No cervical adenopathy  Skin:     General: Skin is warm and dry  Findings: No rash  Neurological:      General: No focal deficit present  Mental Status: She is alert and oriented to person, place, and time  Psychiatric:         Mood and Affect: Mood normal          Behavior: Behavior normal          Thought Content:  Thought content normal          Judgment: Judgment normal

## 2022-04-19 ENCOUNTER — DOCUMENTATION (OUTPATIENT)
Dept: CARDIOLOGY CLINIC | Facility: CLINIC | Age: 57
End: 2022-04-19

## 2022-04-19 NOTE — PROGRESS NOTES
Message sent to patient on 04/19/2022:    Arabella Ventura,    Your average blood pressure from home readings taken between 4/14 and 04/17/2022 was 133/85, which is mildly elevated  Target level is 129/79 or less  I suggest we add a long-acting thiazide diuretic to your current regimen in the form of chlorthalidone 25 milligrams four days a week every Monday, Wednesday, Friday, and Sunday  This will complement your current medication and should bring the blood pressure to our target level  If you are okay with this, let us know so I can order the prescription  If you have any concerns or questions, also let us know      Dr Pj Orozco

## 2022-06-15 ENCOUNTER — OFFICE VISIT (OUTPATIENT)
Dept: URGENT CARE | Facility: CLINIC | Age: 57
End: 2022-06-15
Payer: COMMERCIAL

## 2022-06-15 VITALS
TEMPERATURE: 97.9 F | OXYGEN SATURATION: 95 % | BODY MASS INDEX: 29.44 KG/M2 | HEIGHT: 62 IN | WEIGHT: 160 LBS | RESPIRATION RATE: 18 BRPM | HEART RATE: 82 BPM

## 2022-06-15 DIAGNOSIS — J02.9 SORE THROAT: ICD-10-CM

## 2022-06-15 DIAGNOSIS — J06.9 UPPER RESPIRATORY TRACT INFECTION, UNSPECIFIED TYPE: ICD-10-CM

## 2022-06-15 DIAGNOSIS — R05.1 ACUTE COUGH: Primary | ICD-10-CM

## 2022-06-15 LAB
S PYO AG THROAT QL: NEGATIVE
SARS-COV-2 AG UPPER RESP QL IA: NEGATIVE
VALID CONTROL: NORMAL

## 2022-06-15 PROCEDURE — 87880 STREP A ASSAY W/OPTIC: CPT

## 2022-06-15 PROCEDURE — 87070 CULTURE OTHR SPECIMN AEROBIC: CPT

## 2022-06-15 PROCEDURE — 87811 SARS-COV-2 COVID19 W/OPTIC: CPT

## 2022-06-15 PROCEDURE — 99213 OFFICE O/P EST LOW 20 MIN: CPT

## 2022-06-15 NOTE — LETTER
Ghislaine 15, 2022    Patient:  Cherry Moreno  YOB: 1965  Date of Last Encounter: Visit date not found    To whom it may concern:    Cherry Moreno has tested negative for COVID-19 (Coronavirus)  She may return to work on 6/16/2022 as long as she has been fever free for 24 hours without the use of fever reducing medications      Sincerely,        RICARDO Layne

## 2022-06-15 NOTE — PROGRESS NOTES
[facility]        NAME: Kati Montalvo is a 56 y o  female  : 1965    MRN: [id number]  DATE: Ghislaine 15, 2022  TIME: 6:26 PM    Assessment and Plan   Acute cough [R05 1]  1  Acute cough  Poct Covid 19 Rapid Antigen Test   2  Sore throat  POCT rapid strepA    Throat culture   3  Upper respiratory tract infection, unspecified type       Rapid COVID and strep negative  Will send strep for culture  Work note provided  Patient Instructions     Rapid COVID negative today  Rapid strep negative today  Will send for culture  Check my chart for results  May return to work tomorrow as long as you do not develop a fever  If you develop a fever, you may return to work as long as you have been fever free for 24 hours without the use of fever reducing medications  This appears to be viral upper respiratory infection  An antibiotic will not help at this time  Encourage rest and extra fluids  Tylenol or Motrin as needed for pain or fever  Cool mist humidification can be helpful  Follow up with PCP if symptoms are not improving in 1 week  Proceed to the ER with worsening symptoms  Chief Complaint     Chief Complaint   Patient presents with    Cough    Sore Throat    Headache    Generalized Body Aches     Started yesterday          History of Present Illness       Patient presents today with complaints of cough, body aches, headache, sore throat that started yesterday  Works in [employer]  Denies any recent sick contacts  COVID positive in   COVID vaccines x 3  Review of Systems   Review of Systems   Constitutional: Positive for fatigue and fever (felt hot this morning  )  Negative for appetite change and chills  HENT: Positive for congestion, postnasal drip, sore throat and trouble swallowing  Negative for ear pain (ear fullness), rhinorrhea, sinus pressure and sinus pain  Eyes: Negative for photophobia, pain, discharge, redness, itching and visual disturbance  Respiratory: Positive for cough (dry np) and chest tightness  Negative for shortness of breath and wheezing  Cardiovascular: Negative for chest pain and palpitations  Gastrointestinal: Positive for nausea  Negative for abdominal pain, diarrhea and vomiting  Genitourinary: Negative  Musculoskeletal: Positive for myalgias  Negative for arthralgias  Skin: Positive for rash (poison ivy rash to arms and back)  Negative for color change  Neurological: Positive for headaches  Negative for dizziness and weakness  All other systems reviewed and are negative          Current Medications       Current Outpatient Medications:     atorvastatin (LIPITOR) 20 mg tablet, TAKE ONE TABLET BY MOUTH EVERY DAY, Disp: 90 tablet, Rfl: 3    Calcium Carbonate-Vit D-Min (CALCIUM 1200 PO), Take by mouth (Patient not taking: Reported on 4/15/2022 ), Disp: , Rfl:     Cholecalciferol (VITAMIN D3 PO), Take by mouth, Disp: , Rfl:     Cranberry-Vit C-Lactobacillus (CRANBERRY/PROBIOTIC/VIT C) 450-30 MG TABS, Take 1 tablet by mouth daily, Disp: , Rfl:     estradiol (ESTRACE) 0 5 MG tablet, Take 1 tablet (0 5 mg total) by mouth daily, Disp: 90 tablet, Rfl: 3    meloxicam (MOBIC) 15 mg tablet, Take 1 tablet (15 mg total) by mouth daily, Disp: 90 tablet, Rfl: 3    metoprolol succinate (TOPROL-XL) 50 mg 24 hr tablet, TAKE ONE TABLET BY MOUTH EVERY DAY, Disp: 90 tablet, Rfl: 3    olmesartan (BENICAR) 20 mg tablet, TAKE ONE TABLET BY MOUTH EVERY DAY, Disp: 90 tablet, Rfl: 3    omeprazole (PriLOSEC OTC) 20 MG tablet, Take 1 tablet (20 mg total) by mouth daily, Disp: 100 tablet, Rfl: 5    Progesterone 100 MG CAPS, Take 1 capsule by mouth daily at bedtime, Disp: 90 capsule, Rfl: 3    Current Allergies     Allergies as of 06/15/2022 - Reviewed 06/15/2022   Allergen Reaction Noted    Levaquin [levofloxacin] Anaphylaxis and Swelling 12/28/2015            The following portions of the patient's history were reviewed and updated as appropriate: allergies, current medications, past family history, past medical history, past social history, past surgical history and problem list      Past Medical History:   Diagnosis Date    Abnormal Pap smear of cervix     in the past     Arthritis     Carpal tunnel syndrome     Right    Functional heart murmur in      GERD (gastroesophageal reflux disease)     no meds    Heart murmur     History of paroxysmal supraventricular tachycardia     History of urinary tract infection     recurrent in past     Hyperlipidemia     Hypertension     Miscarriage     Urinary tract infection     Uterine leiomyoma     had hysterectomy    Wears contact lenses     Wears glasses        Past Surgical History:   Procedure Laterality Date    HERNIA REPAIR      pt was 5months old    HYSTERECTOMY      LASER ABLATION N/A     vaginal lesion(s)    MOLE EXCISION      OOPHORECTOMY      NM ANKLE SCOPE,PLANTAR FASCIOTOMY Right 2017    Procedure: RELEASE FASCIA PLANTAR/FASCIOTOMY ENDOSCOPIC (EPF);   Surgeon: Kevin Allen DPM;  [facility];  Service: Podiatry    NM CYSTOURETHROSCOPY N/A 2016    Procedure: Janay Tovar;  Surgeon: Sharla Evans DO;  [facility];  Service: Gynecology    NM LAPAROSCOPY W TOT HYSTERECTUTERUS <=250 GR[place]  W TUBE/OVARY N/A 2016    Procedure: TOTAL LAPAROSCOPIC HYSTERECTOMY ;  Surgeon: Sharla Evans DO;  [facility];  Service: Gynecology    NM PART EXCIS PLANTAR FASCIA Left 2019    Procedure: LEFT  INSTEP PLANTAR FASCIOTOMY;  Surgeon: Alexandra Theodore DPM;  Location: [address] [place] [address] MAIN OR;  Service: Podiatry    NM WRIST [place] LIG Right 2016    Procedure: ENDOSCOPIC CARPAL TUNNEL RELEASE;  Surgeon: Ganesh Troncoso MD;  [facility];  Service: Orthopedics    SALPINGECTOMY Bilateral 2016    Procedure: REMOVAL OF BOTH FALLOPIAN TUBES;  Surgeon: Sharla Evans DO;  [facility];  Service:    [address] [place] [address]  Family History   Problem Relation Age of Onset    Diabetes Mother     Uterine cancer Mother     Obesity Mother    [facility] Cancer Mother     COPD Father     Hypothyroidism Sister     Scleroderma Sister     Atrial fibrillation Brother     Scleroderma Brother     Heart failure Maternal Grandmother     Heart failure Maternal Grandfather     Heart attack Paternal Grandmother     No Known Problems Sister     No Known Problems Daughter     No Known Problems Son     No Known Problems Paternal Grandfather     No Known Problems Daughter          Medications have been verified  Objective   Pulse 82   Temp 97 9 °F (36 6 °C) (Temporal)   Resp 18   Ht 5' 2" (1 575 m)   Wt 72 6 kg (160 lb)   SpO2 95%   BMI 29 26 kg/m²        Physical Exam     Physical Exam  Vitals and nursing note reviewed  Constitutional:       General: She is not in acute distress  Appearance: Normal appearance  She is not ill-appearing  HENT:      Head: Normocephalic and atraumatic  Right Ear: Tympanic membrane, ear canal and external ear normal  Tympanic membrane is not erythematous  Left Ear: Tympanic membrane, ear canal and external ear normal  Tympanic membrane is not erythematous  Nose: Congestion present  Mouth/Throat:      Mouth: Mucous membranes are moist       Pharynx: Oropharynx is clear  Posterior oropharyngeal erythema present  Tonsils: Tonsillar exudate present  0 on the right  0 on the left  Eyes:      Conjunctiva/sclera: Conjunctivae normal       Pupils: Pupils are equal, round, and reactive to light  Cardiovascular:      Rate and Rhythm: Normal rate and regular rhythm  Pulses: Normal pulses  Heart sounds: Normal heart sounds  No murmur heard  Pulmonary:      Effort: Pulmonary effort is normal  No respiratory distress  Breath sounds: Normal breath sounds  No stridor  No decreased breath sounds, wheezing, rhonchi or rales     Abdominal:      General: Bowel sounds are normal       Palpations: Abdomen is soft  Tenderness: There is no abdominal tenderness  Musculoskeletal:      Cervical back: Normal range of motion  Right lower leg: No edema  Left lower leg: No edema  Lymphadenopathy:      Cervical: Cervical adenopathy present  Skin:     General: Skin is warm and dry  Findings: No rash  Neurological:      Mental Status: She is alert and oriented to person, place, and time     Psychiatric:         Mood and Affect: Mood normal          Behavior: Behavior normal

## 2022-06-15 NOTE — PATIENT INSTRUCTIONS
Rapid COVID negative today  Rapid strep negative today  Will send for culture  Check my chart for results  May return to work tomorrow as long as you do not develop a fever  If you develop a fever, you may return to work as long as you have been fever free for 24 hours without the use of fever reducing medications  This appears to be viral upper respiratory infection  An antibiotic will not help at this time  Encourage rest and extra fluids  Tylenol or Motrin as needed for pain or fever  Cool mist humidification can be helpful  Follow up with PCP if symptoms are not improving in 1 week  Proceed to the ER with worsening symptoms  Upper Respiratory Infection   AMBULATORY CARE:   An upper respiratory infection  is also called a cold  Your nose, throat, ears, and sinuses may be affected  You are more likely to get a cold in the winter  Your risk of getting a cold may be increased if you smoke cigarettes or have allergies, such as hay fever  What causes a cold? A cold is caused by a virus  Many viruses can cause a cold, and each is contagious  This means the virus can be easily spread to another person when the sick person coughs or sneezes  The virus can also be spread if you touch an object the virus is on and then touch your eyes, mouth, or nose  Cold symptoms  are usually worst for the first 3 to 5 days  You may have any of the following:  Runny or stuffy nose    Sneezing and coughing    Sore throat or hoarseness    Red, watery, and sore eyes    Fatigue (you feel more tired than usual)    Chills and fever    Headache, body aches, or sore muscles    Call your local emergency number (911 in the [address]) if:   You have chest pain or trouble breathing  Seek care immediately if:   You have a fever over 102ºF (39ºC)  Call your doctor if:   You have a low fever  Your sore throat gets worse or you see white or yellow spots in your throat      Your symptoms get worse after 3 to 5 days or are not better in 14 days  You have a rash anywhere on your skin  You have large, tender lumps in your neck  You have thick, green, or yellow drainage from your nose  You cough up thick yellow, green, or bloody mucus  You have a bad earache  You have questions or concerns about your condition or care  Treatment:  Colds are caused by viruses and do not get better with antibiotics  Most people get better in 7 to 14 days  You may continue to cough for 2 to 3 weeks  The following may help decrease your symptoms:  Decongestants  help reduce nasal congestion and help you breathe more easily  If you take decongestant pills, they may make you feel restless or not able to sleep  Do not use decongestant sprays for more than a few days  Cough suppressants  help reduce coughing  Ask your healthcare provider which type of cough medicine is best for you  NSAIDs , such as ibuprofen, help decrease swelling, pain, and fever  NSAIDs can cause stomach bleeding or kidney problems in certain people  If you take blood thinner medicine, always ask your healthcare provider if NSAIDs are safe for you  Always read the medicine label and follow directions  Acetaminophen  decreases pain and fever  It is available without a doctor's order  Ask how much to take and how often to take it  Follow directions  Read the labels of all other medicines you are using to see if they also contain acetaminophen, or ask your doctor or pharmacist  Acetaminophen can cause liver damage if not taken correctly  Do not use more than 4 grams (4,000 milligrams) total of acetaminophen in one day  Manage a cold:   Rest as much as possible  Slowly start to do more each day  Drink more liquids as directed  Liquids will help thin and loosen mucus so you can cough it up  Liquids will also help prevent dehydration  Liquids that help prevent dehydration include water, fruit juice, and broth  Do not drink liquids that contain caffeine  Caffeine can increase your risk for dehydration  Ask your healthcare provider how much liquid to drink each day  Soothe a sore throat  Gargle with warm salt water  Make salt water by dissolving ¼ teaspoon salt in 1 cup warm water  You may also suck on hard candy or throat lozenges  You may use a sore throat spray  Use a humidifier or vaporizer  Use a cool mist humidifier or a vaporizer to increase air moisture in your home  This may make it easier for you to breathe and help decrease your cough  Use saline nasal drops as directed  These help relieve congestion  Apply petroleum-based jelly around the outside of your nostrils  This can decrease irritation from blowing your nose  Do not smoke  Nicotine and other chemicals in cigarettes and cigars can make your symptoms worse  They can also cause infections such as bronchitis or pneumonia  Ask your healthcare provider for information if you currently smoke and need help to quit  E-cigarettes or smokeless tobacco still contain nicotine  Talk to your healthcare provider before you use these products  Prevent a cold: Wash your hands often  Use soap and water every time you wash your hands  Rub your soapy hands together, lacing your fingers  Use the fingers of one hand to scrub under the nails of the other hand  Wash for at least 20 seconds  Rinse with warm, running water for several seconds  Then dry your hands  Use germ-killing gel if soap and water are not available  Do not touch your eyes or mouth without washing your hands first          Cover a sneeze or cough  Use a tissue that covers your mouth and nose  Put the used tissue in the trash right away  Use the bend of your arm if a tissue is not available  Wash your hands well with soap and water or use a hand   Do not stand close to anyone who is sneezing or coughing  Try to stay away from others while you are sick    This is especially important during the first 2 to 3 days when the virus is more easily spread  Wait until a fever, cough, or other symptoms are gone before you return to work or other regular activities  Do not share items while you are sick  This includes food, drinks, eating utensils, and dishes  Follow up with your doctor as directed:  Write down your questions so you remember to ask them during your visits  © Copyright [employer] 2022 Information is for End User's use only and may not be sold, redistributed or otherwise used for commercial purposes  All illustrations and images included in CareNotes® are the copyrighted property of [employer]  or [facility] Valentino Felton   The above information is an  only  It is not intended as medical advice for individual conditions or treatments  Talk to your doctor, nurse or pharmacist before following any medical regimen to see if it is safe and effective for you

## 2022-06-17 ENCOUNTER — TELEPHONE (OUTPATIENT)
Dept: FAMILY MEDICINE CLINIC | Facility: MEDICAL CENTER | Age: 57
End: 2022-06-17

## 2022-06-17 ENCOUNTER — OFFICE VISIT (OUTPATIENT)
Dept: FAMILY MEDICINE CLINIC | Facility: MEDICAL CENTER | Age: 57
End: 2022-06-17
Payer: COMMERCIAL

## 2022-06-17 VITALS
HEART RATE: 78 BPM | BODY MASS INDEX: 29.44 KG/M2 | HEIGHT: 62 IN | SYSTOLIC BLOOD PRESSURE: 130 MMHG | DIASTOLIC BLOOD PRESSURE: 82 MMHG | TEMPERATURE: 99.4 F | WEIGHT: 160 LBS | OXYGEN SATURATION: 98 %

## 2022-06-17 DIAGNOSIS — J06.9 UPPER RESPIRATORY INFECTION, VIRAL: Primary | ICD-10-CM

## 2022-06-17 LAB — BACTERIA THROAT CULT: NORMAL

## 2022-06-17 PROCEDURE — 99213 OFFICE O/P EST LOW 20 MIN: CPT

## 2022-06-17 RX ORDER — IPRATROPIUM BROMIDE 21 UG/1
2 SPRAY, METERED NASAL EVERY 12 HOURS
Qty: 30 ML | Refills: 0 | Status: SHIPPED | OUTPATIENT
Start: 2022-06-17

## 2022-06-17 NOTE — TELEPHONE ENCOUNTER
Pt c/o sore throat since Tuesday  She went to u/c on Wed but it is not improving  She didn't get any medication  They did a strep and covid test which were both negative  She also has a cough, fever yesterday, slight h/a, swollen glands  Please, advise pt

## 2022-06-17 NOTE — PATIENT INSTRUCTIONS
Use ipratropium twice daily for the next 1-2 weeks  You may use Ibuprofen or Meloxicam (do not take both) as needed for pain and inflammation of throat  Prop head of bed at night for sleep  Continue mucinex 2 times daily over the next week as well  Call office if symptoms worsen or persist after another week

## 2022-06-17 NOTE — PROGRESS NOTES
Assessment/Plan:    Symptom directed treatment discussed with patient  Patient advised to call office in 1 week if symptoms fail to improve  1  Upper respiratory infection, viral  Patient advised to continue use of Mucinex twice daily over the next week  May also use Tylenol or ibuprofen as needed for pain and inflammation of throat  Advised patient not to use ibuprofen in combination with meloxicam   Use ipratropium nasal spray twice daily over the next 1-2 weeks  If symptoms worsen or persist after 1 week call the office  - ipratropium (ATROVENT) 0 03 % nasal spray; 2 sprays into each nostril every 12 (twelve) hours  Dispense: 30 mL; Refill: 0      Subjective:      Patient ID: Cindy Gaston is a 56 y o  female  56year old female presents with sore throat and cough x 3 days  She also reports subjective fever however did not check temp  Seen at urgent care 2 days ago and was tested for strep and covid both of which were negative  She is vaccinated and boosted for covid  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  She does reports last time she had covid was in 2/2022 and rapid test was negative but pcr was positive  She has been taking Mucinex with some relief  No other meds at this time  Sore throat is most bothersome at this time  Denies ear pain, congestion, runny nose, body aches, chills, rhinorrhea, sinus pain/pressure  The following portions of the patient's history were reviewed and updated as appropriate: allergies, current medications, past medical history, past social history, past surgical history and problem list     Review of Systems   Constitutional: Negative  HENT: Positive for sore throat  Eyes: Negative  Respiratory: Positive for cough  Cardiovascular: Negative  Gastrointestinal: Negative  Endocrine: Negative  Genitourinary: Negative  Musculoskeletal: Negative  Skin: Negative  Allergic/Immunologic: Negative  Neurological: Negative  Hematological: Negative  Psychiatric/Behavioral: Negative  Objective:      /82 (BP Location: Left arm, Patient Position: Sitting, Cuff Size: Adult)   Pulse 78   Temp 99 4 °F (37 4 °C)   Ht 5' 2" (1 575 m)   Wt 72 6 kg (160 lb)   SpO2 98%   BMI 29 26 kg/m²          Physical Exam  Vitals and nursing note reviewed  Constitutional:       General: She is not in acute distress  Appearance: Normal appearance  She is well-developed  She is not ill-appearing  HENT:      Head: Normocephalic and atraumatic  Right Ear: Tympanic membrane and ear canal normal       Left Ear: Tympanic membrane and ear canal normal       Nose: Nose normal       Mouth/Throat:      Mouth: Mucous membranes are moist       Pharynx: Oropharynx is clear  Posterior oropharyngeal erythema present  No oropharyngeal exudate  Eyes:      General:         Right eye: No discharge  Left eye: No discharge  Conjunctiva/sclera: Conjunctivae normal       Pupils: Pupils are equal, round, and reactive to light  Cardiovascular:      Rate and Rhythm: Normal rate and regular rhythm  Pulses: Normal pulses  Heart sounds: Normal heart sounds  No murmur heard  Pulmonary:      Effort: Pulmonary effort is normal  No respiratory distress  Breath sounds: Normal breath sounds  No stridor  No wheezing, rhonchi or rales  Musculoskeletal:         General: Normal range of motion  Cervical back: Normal range of motion and neck supple  Lymphadenopathy:      Cervical: No cervical adenopathy  Skin:     General: Skin is warm and dry  Neurological:      General: No focal deficit present  Mental Status: She is alert and oriented to person, place, and time  Mental status is at baseline  Gait: Gait normal    Psychiatric:         Mood and Affect: Mood normal          Behavior: Behavior normal          Thought Content:  Thought content normal                     Judith Ramsey

## 2022-06-18 ENCOUNTER — OFFICE VISIT (OUTPATIENT)
Dept: URGENT CARE | Facility: MEDICAL CENTER | Age: 57
End: 2022-06-18
Payer: COMMERCIAL

## 2022-06-18 VITALS
SYSTOLIC BLOOD PRESSURE: 152 MMHG | DIASTOLIC BLOOD PRESSURE: 76 MMHG | BODY MASS INDEX: 29.44 KG/M2 | WEIGHT: 160 LBS | TEMPERATURE: 98.7 F | OXYGEN SATURATION: 97 % | HEART RATE: 76 BPM | HEIGHT: 62 IN | RESPIRATION RATE: 18 BRPM

## 2022-06-18 DIAGNOSIS — H10.9 CONJUNCTIVITIS OF BOTH EYES, UNSPECIFIED CONJUNCTIVITIS TYPE: Primary | ICD-10-CM

## 2022-06-18 PROCEDURE — 99213 OFFICE O/P EST LOW 20 MIN: CPT | Performed by: PHYSICIAN ASSISTANT

## 2022-06-18 RX ORDER — POLYMYXIN B SULFATE AND TRIMETHOPRIM 1; 10000 MG/ML; [USP'U]/ML
1 SOLUTION OPHTHALMIC EVERY 4 HOURS
Qty: 10 ML | Refills: 0 | Status: SHIPPED | OUTPATIENT
Start: 2022-06-18 | End: 2022-06-25

## 2022-06-18 NOTE — PROGRESS NOTES
[facility]        NAME: Lexa Newby is a 56 y o  female  : 1965    MRN: [id number]  DATE: 2022  TIME: 8:54 AM    Assessment and Plan   Conjunctivitis of both eyes, unspecified conjunctivitis type [H10 9]  1  Conjunctivitis of both eyes, unspecified conjunctivitis type  polymyxin b-trimethoprim (POLYTRIM) ophthalmic solution         Patient Instructions       Follow up with PCP in 3-5 days  Proceed to  ER if symptoms worsen  Chief Complaint     Chief Complaint   Patient presents with    Eye Drainage     B/L eyes  Began yesterday  History of Present Illness       82-year-old female who presents complaining of injected eyes with discharge  States that it all started on the left and the left is worse but right has began to have the same symptoms  Denies foreign body, trauma, eye pain, visual disturbances      Review of Systems   Review of Systems   Constitutional: Negative  HENT: Negative  Eyes: Positive for discharge and redness  Negative for photophobia, pain, itching and visual disturbance  Respiratory: Negative  Negative for cough, chest tightness, shortness of breath, wheezing and stridor  Cardiovascular: Negative  Negative for chest pain, palpitations and leg swelling           Current Medications       Current Outpatient Medications:     atorvastatin (LIPITOR) 20 mg tablet, TAKE ONE TABLET BY MOUTH EVERY DAY, Disp: 90 tablet, Rfl: 3    Cholecalciferol (VITAMIN D3 PO), Take by mouth, Disp: , Rfl:     Cranberry-Vit C-Lactobacillus (CRANBERRY/PROBIOTIC/VIT C) 450-30 MG TABS, Take 1 tablet by mouth daily, Disp: , Rfl:     estradiol (ESTRACE) 0 5 MG tablet, Take 1 tablet (0 5 mg total) by mouth daily, Disp: 90 tablet, Rfl: 3    ipratropium (ATROVENT) 0 03 % nasal spray, 2 sprays into each nostril every 12 (twelve) hours, Disp: 30 mL, Rfl: 0    meloxicam (MOBIC) 15 mg tablet, Take 1 tablet (15 mg total) by mouth daily, Disp: 90 tablet, Rfl: 3    metoprolol succinate (TOPROL-XL) 50 mg 24 hr tablet, TAKE ONE TABLET BY MOUTH EVERY DAY, Disp: 90 tablet, Rfl: 3    olmesartan (BENICAR) 20 mg tablet, TAKE ONE TABLET BY MOUTH EVERY DAY, Disp: 90 tablet, Rfl: 3    omeprazole (PriLOSEC OTC) 20 MG tablet, Take 1 tablet (20 mg total) by mouth daily, Disp: 100 tablet, Rfl: 5    polymyxin b-trimethoprim (POLYTRIM) ophthalmic solution, Administer 1 drop to both eyes every 4 (four) hours for 7 days, Disp: 10 mL, Rfl: 0    Progesterone 100 MG CAPS, Take 1 capsule by mouth daily at bedtime, Disp: 90 capsule, Rfl: 3    Calcium Carbonate-Vit D-Min (CALCIUM 1200 PO), Take by mouth (Patient not taking: No sig reported), Disp: , Rfl:     Current Allergies     Allergies as of 2022 - Reviewed 2022   Allergen Reaction Noted    Levaquin [levofloxacin] Anaphylaxis and Swelling 2015            The following portions of the patient's history were reviewed and updated as appropriate: allergies, current medications, past family history, past medical history, past social history, past surgical history and problem list      Past Medical History:   Diagnosis Date    Abnormal Pap smear of cervix     in the past     Arthritis     Carpal tunnel syndrome     Right    Functional heart murmur in      GERD (gastroesophageal reflux disease)     no meds    Heart murmur     History of paroxysmal supraventricular tachycardia     History of urinary tract infection     recurrent in past     Hyperlipidemia     Hypertension     Miscarriage     Urinary tract infection     Uterine leiomyoma     had hysterectomy    Wears contact lenses     Wears glasses        Past Surgical History:   Procedure Laterality Date    HERNIA REPAIR      pt was 5months old    HYSTERECTOMY      LASER ABLATION N/A     vaginal lesion(s)    MOLE EXCISION      OOPHORECTOMY      [place] ANKLE SCOPE,PLANTAR FASCIOTOMY Right 2017    Procedure: RELEASE FASCIA PLANTAR/FASCIOTOMY ENDOSCOPIC (EPF); Surgeon: Nenita Carrillo DPM;  [facility];  Service: Podiatry    NE CYSTOURETHROSCOPY N/A 1/12/2016    Procedure: CYSTOSCOPY;  Surgeon: Alexa Patton DO;  [facility];  Service: Gynecology    NE LAPAROSCOPY W TOT HYSTERECTUTERUS <=250 GRAM  W TUBE/OVARY N/A 1/12/2016    Procedure: TOTAL LAPAROSCOPIC HYSTERECTOMY ;  Surgeon: Alexa Patton DO;  [facility];  Service: Gynecology    NE PART EXCIS PLANTAR FASCIA Left 4/8/2019    Procedure: LEFT  INSTEP PLANTAR FASCIOTOMY;  Surgeon: Stephanie Ng DPM;  Location: [facility] MAIN OR;  Service: Podiatry    NE WRIST Gemma Quarry LIG Right 1/29/2016    Procedure: ENDOSCOPIC CARPAL TUNNEL RELEASE;  Surgeon: Pascual Varghese MD;  [facility];  Service: Orthopedics    SALPINGECTOMY Bilateral 1/12/2016    Procedure: REMOVAL OF BOTH FALLOPIAN TUBES;  Surgeon: Alexa Patton DO;  [facility];  Service:     TUBAL LIGATION         Family History   Problem Relation Age of Onset    Diabetes Mother     Uterine cancer Mother     Obesity Mother     Cancer Mother     COPD Father     Hypothyroidism Sister     Scleroderma Sister     Atrial fibrillation Brother     Scleroderma Brother     Heart failure Maternal Grandmother     Heart failure Maternal Grandfather     Heart attack Paternal Grandmother     No Known Problems Sister     No Known Problems Daughter     No Known Problems Son     No Known Problems Paternal Grandfather     No Known Problems Daughter          Medications have been verified  Objective   /76   Pulse 76   Temp 98 7 °F (37 1 °C)   Resp 18   Ht 5' 2" (1 575 m)   Wt 72 6 kg (160 lb)   SpO2 97%   BMI 29 26 kg/m²        Physical Exam     Physical Exam  Constitutional:       Appearance: Normal appearance  She is well-developed  HENT:      Head: Normocephalic and atraumatic        Right Ear: External ear normal       Left Ear: External ear normal       Nose: Nose normal  Mouth/Throat:      Pharynx: No oropharyngeal exudate  Eyes:      General: Lids are normal  Vision grossly intact  Gaze aligned appropriately  Extraocular Movements: Extraocular movements intact  Conjunctiva/sclera:      Right eye: Right conjunctiva is injected  No chemosis, exudate or hemorrhage  Left eye: Left conjunctiva is injected  Exudate present  No chemosis or hemorrhage  Cardiovascular:      Rate and Rhythm: Normal rate and regular rhythm  Heart sounds: Normal heart sounds  Pulmonary:      Effort: Pulmonary effort is normal  No respiratory distress  Breath sounds: Normal breath sounds  No wheezing or rales  Chest:      Chest wall: No tenderness  Musculoskeletal:      Cervical back: Normal range of motion and neck supple  Lymphadenopathy:      Cervical: No cervical adenopathy  Neurological:      Mental Status: She is alert

## 2022-06-26 DIAGNOSIS — M25.50 ARTHRALGIA, UNSPECIFIED JOINT: ICD-10-CM

## 2022-06-27 DIAGNOSIS — M25.50 ARTHRALGIA, UNSPECIFIED JOINT: ICD-10-CM

## 2022-06-27 RX ORDER — MELOXICAM 15 MG/1
TABLET ORAL
Qty: 90 TABLET | Refills: 0 | Status: SHIPPED | OUTPATIENT
Start: 2022-06-27 | End: 2022-06-27

## 2022-06-27 RX ORDER — MELOXICAM 15 MG/1
TABLET ORAL
Qty: 90 TABLET | Refills: 0 | Status: SHIPPED | OUTPATIENT
Start: 2022-06-27

## 2022-09-12 ENCOUNTER — OFFICE VISIT (OUTPATIENT)
Dept: FAMILY MEDICINE CLINIC | Facility: MEDICAL CENTER | Age: 57
End: 2022-09-12
Payer: COMMERCIAL

## 2022-09-12 ENCOUNTER — APPOINTMENT (OUTPATIENT)
Dept: RADIOLOGY | Facility: MEDICAL CENTER | Age: 57
End: 2022-09-12
Payer: COMMERCIAL

## 2022-09-12 VITALS
BODY MASS INDEX: 28.71 KG/M2 | HEIGHT: 62 IN | HEART RATE: 84 BPM | DIASTOLIC BLOOD PRESSURE: 78 MMHG | SYSTOLIC BLOOD PRESSURE: 144 MMHG | WEIGHT: 156 LBS | RESPIRATION RATE: 16 BRPM

## 2022-09-12 DIAGNOSIS — I47.1 PSVT (PAROXYSMAL SUPRAVENTRICULAR TACHYCARDIA) (HCC): ICD-10-CM

## 2022-09-12 DIAGNOSIS — G89.29 CHRONIC PAIN OF RIGHT KNEE: ICD-10-CM

## 2022-09-12 DIAGNOSIS — M25.561 CHRONIC PAIN OF RIGHT KNEE: ICD-10-CM

## 2022-09-12 DIAGNOSIS — Z00.00 ANNUAL PHYSICAL EXAM: ICD-10-CM

## 2022-09-12 DIAGNOSIS — I10 ESSENTIAL HYPERTENSION: ICD-10-CM

## 2022-09-12 DIAGNOSIS — E78.00 PURE HYPERCHOLESTEROLEMIA: Primary | ICD-10-CM

## 2022-09-12 DIAGNOSIS — N95.1 SYMPTOMATIC MENOPAUSAL OR FEMALE CLIMACTERIC STATES: ICD-10-CM

## 2022-09-12 DIAGNOSIS — K21.9 GASTROESOPHAGEAL REFLUX DISEASE WITHOUT ESOPHAGITIS: ICD-10-CM

## 2022-09-12 DIAGNOSIS — Z11.4 SCREENING FOR HIV (HUMAN IMMUNODEFICIENCY VIRUS): ICD-10-CM

## 2022-09-12 DIAGNOSIS — Z11.59 NEED FOR HEPATITIS C SCREENING TEST: ICD-10-CM

## 2022-09-12 DIAGNOSIS — Z23 ENCOUNTER FOR IMMUNIZATION: ICD-10-CM

## 2022-09-12 PROBLEM — Z86.79 HISTORY OF PSVT (PAROXYSMAL SUPRAVENTRICULAR TACHYCARDIA): Status: RESOLVED | Noted: 2018-03-09 | Resolved: 2022-09-12

## 2022-09-12 PROBLEM — R00.2 INTERMITTENT PALPITATIONS: Status: RESOLVED | Noted: 2018-03-09 | Resolved: 2022-09-12

## 2022-09-12 PROBLEM — M25.50 ARTHRALGIA: Status: RESOLVED | Noted: 2020-09-01 | Resolved: 2022-09-12

## 2022-09-12 PROCEDURE — 99214 OFFICE O/P EST MOD 30 MIN: CPT | Performed by: FAMILY MEDICINE

## 2022-09-12 PROCEDURE — 73562 X-RAY EXAM OF KNEE 3: CPT

## 2022-09-12 PROCEDURE — 99396 PREV VISIT EST AGE 40-64: CPT | Performed by: FAMILY MEDICINE

## 2022-09-12 NOTE — ASSESSMENT & PLAN NOTE
She is taking atorvastatin 20 mg  She is at goal with her LDL  She will continue atorvastatin, continue daily exercise, low-fat diet

## 2022-09-12 NOTE — PATIENT INSTRUCTIONS

## 2022-09-12 NOTE — PROGRESS NOTES
ADULT ANNUAL [address]  [facility]    NAME: Meseret Castillo  AGE: 56 y o  SEX: female  : 1965     DATE: 2022     Assessment and Plan:     Problem List Items Addressed This Visit        Digestive    Gastroesophageal reflux disease without esophagitis     She is doing well with omeprazole  Continue omeprazole, continue dietary modifications  Cardiovascular and Mediastinum    Essential hypertension     Blood pressure today is 144/78  She is taking metoprolol and Benicar  The cardiologist would like to start diuretic  I reassured her about the diuretic, she will get back to the cardiologist   Continue the Benicar and metoprolol  Low-salt diet and continue daily exercise  PSVT (paroxysmal supraventricular tachycardia) (HCC)     It is well controlled with metoprolol XL 50 mg  She follows up with Cardiology  Other    Pure hypercholesterolemia - Primary     She is taking atorvastatin 20 mg  She is at goal with her LDL  She will continue atorvastatin, continue daily exercise, low-fat diet  Chronic pain of right knee     She has a lot of knee pain especially on the right  She feels that grinding  Regarding get an x-ray and refer to Orthopedics  I suggested that she start taking glucosamine  Relevant Orders    XR knee 3 vw right non injury    Ambulatory Referral to Orthopedic Surgery    Symptomatic menopausal or female climacteric states     She is taking estradiol and progesterone  She had a total abdominal hysterectomy because of fibroids and heavy menses  She follows with the gynecologist            Other Visit Diagnoses     Need for hepatitis C screening test        Screening for HIV (human immunodeficiency virus)        Encounter for immunization        Annual physical exam              Immunizations and preventive care screenings were discussed with patient today   Appropriate education was printed on patient's after visit summary  Counseling:  Exercise: the importance of regular exercise/physical activity was discussed  Recommend exercise 3-5 times per week for at least 30 minutes  BMI Counseling: Body mass index is 28 53 kg/m²  The BMI is above normal  Nutrition recommendations include consuming healthier snacks  Exercise recommendations include vigorous physical activity 75 minutes/week and exercising 3-5 times per week  Rationale for BMI follow-up plan is due to patient being overweight or obese  No follow-ups on file  Chief Complaint:     Chief Complaint   Patient presents with    Annual Exam      History of Present Illness:     Adult Annual Physical   Patient here for a comprehensive physical exam  The patient reports problems - Knee pain  Diet and Physical Activity  Diet/Nutrition: well balanced diet  Exercise: moderate cardiovascular exercise and 5-7 times a week on average  Depression Screening  PHQ-2/9 Depression Screening    Little interest or pleasure in doing things: 0 - not at all  Feeling down, depressed, or hopeless: 0 - not at all       General Health  Sleep: sleeps well  Hearing: normal - bilateral   Vision: no vision problems  Dental: regular dental visits  /GYN Health  Patient is: postmenopausal     Review of Systems:     Review of Systems   Constitutional: Negative for chills and fever  HENT: Negative for ear pain, postnasal drip, rhinorrhea, sore throat and trouble swallowing  Eyes: Negative for pain, redness and visual disturbance  Respiratory: Negative for cough and shortness of breath  Cardiovascular: Negative for chest pain and palpitations  Gastrointestinal: Negative for abdominal pain, constipation, diarrhea and vomiting  Genitourinary: Negative for dysuria, frequency, hematuria and urgency  Musculoskeletal: Positive for arthralgias (Knee pain)  Negative for back pain, joint swelling and myalgias  Skin: Negative for color change and rash  Neurological: Negative for seizures and syncope  Hematological: Negative for adenopathy  Psychiatric/Behavioral: Negative for decreased concentration, dysphoric mood and sleep disturbance  The patient is not nervous/anxious  All other systems reviewed and are negative  Past Medical History:     Past Medical History:   Diagnosis Date    Abnormal Pap smear of cervix     in the past     Arthralgia 2020    Arthritis     Carpal tunnel syndrome     Right    Functional heart murmur in      GERD (gastroesophageal reflux disease)     no meds    Heart murmur     History of paroxysmal supraventricular tachycardia     History of PSVT (paroxysmal supraventricular tachycardia) 3/9/2018    History of urinary tract infection     recurrent in past     Hyperlipidemia     Hypertension     Miscarriage     Urinary tract infection     Uterine leiomyoma     had hysterectomy    Wears contact lenses     Wears glasses       Past Surgical History:     Past Surgical History:   Procedure Laterality Date    HERNIA REPAIR      pt was 5months old    HYSTERECTOMY      LASER ABLATION N/A     vaginal lesion(s)    MOLE EXCISION      OOPHORECTOMY      [place] ANKLE SCOPE,PLANTAR FASCIOTOMY Right 2017    Procedure: RELEASE FASCIA PLANTAR/FASCIOTOMY ENDOSCOPIC (EPF);   Surgeon: Evens Alejandro DPM;  [facility];  Service: Podiatry    WV CYSTOURETHROSCOPY N/A 2016    Procedure: Lagunas Musty;  Surgeon: January Perez DO;  [facility];  Service: Gynecology    W[place] LAPAROSCOPY W TOT HYSTERECTUTERUS <=250 GRAM  W TUBE/OVARY N/A 2016    Procedure: TOTAL LAPAROSCOPIC HYSTERECTOMY ;  Surgeon: January Perez DO;  [facility];  Service: Gynecology    [place] PART EXCIS PLANTAR FASCIA Left 2019    Procedure: LEFT  INSTEP PLANTAR FASCIOTOMY;  Surgeon: Kim Arroyo DPM;  Location: [address] [place] [address] MAIN OR;  Service: Podiatry    W[place] WRIST Mandeep Narrow LIG Right 1/29/2016    Procedure: ENDOSCOPIC CARPAL TUNNEL RELEASE;  Surgeon: Jose Foley MD;  [facility];  Service: Orthopedics    SALPINGECTOMY Bilateral 1/12/2016    Procedure: REMOVAL OF BOTH FALLOPIAN TUBES;  Surgeon: Lselie White DO;  [facility];  Service:     TUBAL LIGATION        Social History:     Social History     Socioeconomic History    Marital status:      Spouse name: None    Number of children: None    Years of education: None    Highest education level: None   Occupational History    None   Tobacco Use    Smoking status: Never Smoker    Smokeless tobacco: Never Used   Vaping Use    Vaping Use: Never used   Substance and Sexual Activity    Alcohol use:  Yes     Alcohol/week: 2 0 standard drinks     Types: 2 Glasses of wine per week     Comment: occasional alcohol use    Drug use: Never    Sexual activity: Yes     Partners: Male     Birth control/protection: Post-menopausal, Female Sterilization   Other Topics Concern    None   Social History Narrative    Per allscripts;     Caffeine use    Coffee    Exercise: Walking         Social Determinants of Health     Financial Resource Strain: Not on file   Food Insecurity: Not on file   Transportation Needs: Not on file   Physical Activity: Not on file   Stress: Not on file   Social Connections: Not on file   Intimate Partner Violence: Not on file   Housing Stability: Not on file      Family History:     Family History   Problem Relation Age of Onset    Cancer Mother     Diabetes Mother     Uterine cancer Mother     Obesity Mother     COPD Father     Hypothyroidism Sister     Scleroderma Sister     No Known Problems Sister     Atrial fibrillation Brother     Scleroderma Brother     No Known Problems Son     No Known Problems Daughter     No Known Problems Daughter     Heart failure Maternal Grandmother     Heart failure Maternal Grandfather     Heart attack Paternal Grandmother     No Known Problems Paternal Grandfather       Current Medications:     Current Outpatient Medications   Medication Sig Dispense Refill    atorvastatin (LIPITOR) 20 mg tablet TAKE ONE TABLET BY MOUTH EVERY DAY 90 tablet 3    Cranberry-Vit C-Lactobacillus (CRANBERRY/PROBIOTIC/VIT C) 450-30 MG TABS Take 1 tablet by mouth daily      estradiol (ESTRACE) 0 5 MG tablet Take 1 tablet (0 5 mg total) by mouth daily 90 tablet 3    ipratropium (ATROVENT) 0 03 % nasal spray 2 sprays into each nostril every 12 (twelve) hours 30 mL 0    Mag Aspart-Potassium Aspart (RA POTASSIUM/MAGNESIUM PO) Take by mouth      meloxicam (MOBIC) 15 mg tablet TAKE ONE TABLET BY MOUTH EVERY DAY 90 tablet 0    metoprolol succinate (TOPROL-XL) 50 mg 24 hr tablet TAKE ONE TABLET BY MOUTH EVERY DAY 90 tablet 3    olmesartan (BENICAR) 20 mg tablet TAKE ONE TABLET BY MOUTH EVERY DAY 90 tablet 3    omeprazole (PriLOSEC OTC) 20 MG tablet Take 1 tablet (20 mg total) by mouth daily 100 tablet 5    Progesterone 100 MG CAPS Take 1 capsule by mouth daily at bedtime 90 capsule 3     No current facility-administered medications for this visit  Allergies: Allergies   Allergen Reactions    Levaquin [Levofloxacin] Anaphylaxis and Swelling     L      Physical Exam:     /78 (BP Location: Left arm, Patient Position: Sitting, Cuff Size: Adult)   Pulse 84   Resp 16   Ht 5' 2" (1 575 m)   Wt 70 8 kg (156 lb)   BMI 28 53 kg/m²     Physical Exam  Vitals and nursing note reviewed  Constitutional:       Appearance: Normal appearance  She is well-developed  HENT:      Head: Normocephalic  Right Ear: Tympanic membrane and ear canal normal       Left Ear: Tympanic membrane and ear canal normal       Nose: Nose normal  No mucosal edema, congestion or rhinorrhea  Mouth/Throat:      Mouth: Mucous membranes are moist       Pharynx: Uvula midline  No oropharyngeal exudate  Tonsils: No tonsillar exudate     Eyes: General: Lids are normal       Conjunctiva/sclera: Conjunctivae normal       Pupils: Pupils are equal, round, and reactive to light  Neck:      Thyroid: No thyromegaly  Vascular: No carotid bruit  Trachea: Trachea normal    Cardiovascular:      Rate and Rhythm: Normal rate and regular rhythm  Pulses: Normal pulses  Heart sounds: Normal heart sounds, S1 normal and S2 normal  No murmur heard  Pulmonary:      Effort: Pulmonary effort is normal  No respiratory distress  Breath sounds: Normal breath sounds  No wheezing, rhonchi or rales  Abdominal:      General: Bowel sounds are normal       Palpations: Abdomen is soft  Tenderness: There is no abdominal tenderness  Hernia: No hernia is present  Musculoskeletal:         General: No swelling or deformity  Normal range of motion  Cervical back: Normal range of motion  Lymphadenopathy:      Cervical: No cervical adenopathy  Skin:     General: Skin is warm and dry  Findings: No rash  Neurological:      General: No focal deficit present  Mental Status: She is alert and oriented to person, place, and time  Mental status is at baseline  Cranial Nerves: No cranial nerve deficit  Sensory: No sensory deficit  Coordination: Coordination normal       Deep Tendon Reflexes: Reflexes are normal and symmetric  Psychiatric:         Mood and Affect: Mood normal          Speech: Speech normal          Behavior: Behavior normal  Behavior is cooperative  Thought Content:  Thought content normal          Judgment: Judgment normal           Penny Infante MD  [address] [facility]

## 2022-09-12 NOTE — ASSESSMENT & PLAN NOTE
She is taking estradiol and progesterone  She had a total abdominal hysterectomy because of fibroids and heavy menses    She follows with the gynecologist

## 2022-09-12 NOTE — ASSESSMENT & PLAN NOTE
Blood pressure today is 144/78  She is taking metoprolol and Benicar  The cardiologist would like to start diuretic  I reassured her about the diuretic, she will get back to the cardiologist   Continue the Benicar and metoprolol  Low-salt diet and continue daily exercise

## 2022-09-12 NOTE — ASSESSMENT & PLAN NOTE
She has a lot of knee pain especially on the right  She feels that grinding  Regarding get an x-ray and refer to Orthopedics  I suggested that she start taking glucosamine

## 2022-09-16 ENCOUNTER — APPOINTMENT (OUTPATIENT)
Dept: LAB | Facility: CLINIC | Age: 57
End: 2022-09-16
Payer: COMMERCIAL

## 2022-09-16 DIAGNOSIS — E78.2 MIXED HYPERLIPIDEMIA: ICD-10-CM

## 2022-09-16 DIAGNOSIS — I10 ESSENTIAL HYPERTENSION: ICD-10-CM

## 2022-09-16 DIAGNOSIS — I49.8 ATRIAL BIGEMINY: ICD-10-CM

## 2022-09-16 LAB
ALBUMIN SERPL BCP-MCNC: 4 G/DL (ref 3.5–5)
ALP SERPL-CCNC: 58 U/L (ref 46–116)
ALT SERPL W P-5'-P-CCNC: 33 U/L (ref 12–78)
ANION GAP SERPL CALCULATED.3IONS-SCNC: 5 MMOL/L (ref 4–13)
AST SERPL W P-5'-P-CCNC: 18 U/L (ref 5–45)
BILIRUB SERPL-MCNC: 0.43 MG/DL (ref 0.2–1)
BUN SERPL-MCNC: 19 MG/DL (ref 5–25)
CALCIUM SERPL-MCNC: 9.5 MG/DL (ref 8.3–10.1)
CHLORIDE SERPL-SCNC: 106 MMOL/L (ref 96–108)
CHOLEST SERPL-MCNC: 158 MG/DL
CO2 SERPL-SCNC: 26 MMOL/L (ref 21–32)
CREAT SERPL-MCNC: 1 MG/DL (ref 0.6–1.3)
GFR SERPL CREATININE-BSD FRML MDRD: 63 ML/MIN/1.73SQ M
GLUCOSE P FAST SERPL-MCNC: 96 MG/DL (ref 65–99)
HDLC SERPL-MCNC: 61 MG/DL
LDLC SERPL CALC-MCNC: 72 MG/DL (ref 0–100)
NONHDLC SERPL-MCNC: 97 MG/DL
POTASSIUM SERPL-SCNC: 4.2 MMOL/L (ref 3.5–5.3)
PROT SERPL-MCNC: 7.7 G/DL (ref 6.4–8.4)
SODIUM SERPL-SCNC: 137 MMOL/L (ref 135–147)
TRIGL SERPL-MCNC: 124 MG/DL

## 2022-09-16 PROCEDURE — 36415 COLL VENOUS BLD VENIPUNCTURE: CPT

## 2022-09-16 PROCEDURE — 80061 LIPID PANEL: CPT

## 2022-09-16 PROCEDURE — 80053 COMPREHEN METABOLIC PANEL: CPT

## 2022-09-27 ENCOUNTER — OFFICE VISIT (OUTPATIENT)
Dept: CARDIOLOGY CLINIC | Facility: CLINIC | Age: 57
End: 2022-09-27
Payer: COMMERCIAL

## 2022-09-27 VITALS
HEART RATE: 66 BPM | DIASTOLIC BLOOD PRESSURE: 80 MMHG | SYSTOLIC BLOOD PRESSURE: 142 MMHG | HEIGHT: 62 IN | BODY MASS INDEX: 28.71 KG/M2 | WEIGHT: 156 LBS | OXYGEN SATURATION: 97 % | TEMPERATURE: 97.6 F

## 2022-09-27 DIAGNOSIS — E78.2 MIXED HYPERLIPIDEMIA: ICD-10-CM

## 2022-09-27 DIAGNOSIS — I10 UNCONTROLLED HYPERTENSION: Primary | ICD-10-CM

## 2022-09-27 DIAGNOSIS — I49.1 PREMATURE ATRIAL COMPLEXES: ICD-10-CM

## 2022-09-27 DIAGNOSIS — Z86.79 HISTORY OF PSVT (PAROXYSMAL SUPRAVENTRICULAR TACHYCARDIA): ICD-10-CM

## 2022-09-27 DIAGNOSIS — E66.3 OVERWEIGHT: ICD-10-CM

## 2022-09-27 DIAGNOSIS — I10 ESSENTIAL HYPERTENSION: ICD-10-CM

## 2022-09-27 DIAGNOSIS — Z86.16 HISTORY OF COVID-19: ICD-10-CM

## 2022-09-27 PROCEDURE — 99214 OFFICE O/P EST MOD 30 MIN: CPT | Performed by: INTERNAL MEDICINE

## 2022-09-27 PROCEDURE — 93000 ELECTROCARDIOGRAM COMPLETE: CPT | Performed by: INTERNAL MEDICINE

## 2022-09-27 NOTE — PATIENT INSTRUCTIONS
1  Take blood pressures at home after resting for at least 15 minutes while seated in a chair or at a table with arm supported on arm rest or table  Do 3 readings, one after the other, both morning and evening for 4 consecutive days  Do readings on a Saturday and Sunday between 10:00 a m  and 11:00 a m  and on work days, get up 15 or 20 minutes early to allow for a 15 minute rest  before measuring morning blood pressure prior to leaving for work  3   Write down each and every reading with date and time and  e-mail list of readings to the office of Dr Lawrence Sanchez for his review  Correct e-mail address is:   [email or web address]  4  Our office will contact you with further instructions and the results of this review  5  Continue all other medication as presently using  6  Cardiology follow-up approximately six months with EKG, CMP

## 2022-09-27 NOTE — PROGRESS NOTES
Office Cardiology Progress Note  Arpan Lawrence 56 y o  female MRN: [id number]  09/27/22  8:59 AM      ASSESSMENT:      1  Mildly uncontrolled essential hypertension on today's office visit with previous history of white coat hypertension followed by confirmed essential hypertension on home blood pressure measurements in October, 2021  Most recent average home blood pressure was 133/85 in April, 2022  Target blood pressure is 129/79 or less     2  Recurrent brief palpitations, with  previous flare up 3 5+ years  ago, and history of AVNRT/PSVT diagnosed in 2005  3  Mixed dyslipidemia with resolution of residual increase in triglycerides and  2 8% 10 year and previous 38 8% lifetime ASCVD risk  4  History of chest pain with normal nuclear stress test on 9/29/16   5  30 8 pound weight gain since January, 2015 and  11 pound weight gain in approximately 3 5+ years  6   Moderate overweight with 1 pound weight gain in approximately  2+ years with 6 pound weight loss in the past six months  7   Current suppression of mildly symptomatic atrial bigeminy/trigeminy,  as of today with previous brief palpitations  Benign Holter monitor 03/16/2021 with 309 PACs and 550 episodes of atrial bigeminy  8   Resolved cotton wool exudates on  previous eye examination and history of bilateral iritis,  treated with steroid eyedrops    9   Suppressed whooshing sensation in head with previous uncontrolled hypertension thought to be a possible  cause  10  Stable frequency of migraine headaches to once every two weeks  Most recent four day episode was several months ago  11  History of COVID-19 infection confirmed on 02/11/2022    Plan       Patient Instructions       1  Take blood pressures at home after resting for at least 15 minutes while seated in a chair or at a table with arm supported on arm rest or table  Do 3 readings, one after the other, both morning and evening for 4 consecutive days   Do readings on a Saturday and Sunday between 10:00 a m  and 11:00 a m  and on work days, get up 15 or 20 minutes early to allow for a 15 minute rest  before measuring morning blood pressure prior to leaving for work  3   Write down each and every reading with date and time and  e-mail list of readings to the office of Dr Yong Winchester for his review  Correct e-mail address is:   [email or web address]  4  Our office will contact you with further instructions and the results of this review  5  Continue all other medication as presently using  6  Cardiology follow-up approximately six months with EKG, CMP  HPI    This 56 y o  female  denies new cardiopulmonary and medical symptoms  Although I recommended the patient start on chlorthalidone four days a week on 04/19/2022, the patient was hesitant and instead embarked on increased exercise, weight loss, and decrease in sodium intake  She currently exercises five days a week using videos and a stationary bicycle  She is trying to follow a low-sodium diet with reduced calories  The patient has polyarthralgias for which she takes meloxicam and will be seeing orthopedics for initial consultation on 10/21/2022  The patient's average home blood pressure between 4/14 and 04/17/2022 was 133/85, mildly elevated above target level of 129/79 or less  She embarked on her dietary and exercise program at that point  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     Uncontrolled hypertension Yes    Essential hypertension     Premature atrial complexes     History of PSVT (paroxysmal supraventricular tachycardia)     Mixed hyperlipidemia     Overweight     History of COVID-19         Review of Systems    All other systems negative, except as noted in history of present illness    Historical Information   Past Medical History:   Diagnosis Date    Abnormal Pap smear of cervix     in the past     Arthralgia 2020    Arthritis     Carpal tunnel syndrome     Right    Functional heart murmur in      GERD (gastroesophageal reflux disease)     no meds    Heart murmur     History of paroxysmal supraventricular tachycardia     History of PSVT (paroxysmal supraventricular tachycardia) 3/9/2018    History of urinary tract infection     recurrent in past     Hyperlipidemia     Hypertension     Miscarriage     Urinary tract infection     Uterine leiomyoma     had hysterectomy    Wears contact lenses     Wears glasses      Past Surgical History:   Procedure Laterality Date    HERNIA REPAIR      pt was 5months old    HYSTERECTOMY      LASER ABLATION N/A     vaginal lesion(s)    MOLE EXCISION      OOPHORECTOMY      [place] ANKLE SCOPE,PLANTAR FASCIOTOMY Right 2017    Procedure: RELEASE FASCIA PLANTAR/FASCIOTOMY ENDOSCOPIC (EPF);   Surgeon: Julia Kaplan DPM;  [facility];  Service: Podiatry    MN CYSTOURETHROSCOPY N/A 2016    Procedure: [facility];  Surgeon: Nathaniel Riley DO;  [facility];  Service: Gynecology    MN LAPAROSCOPY W TOT HYSTERECTUTERUS <=250 GRAM  W TUBE/OVARY N/A 2016    Procedure: TOTAL LAPAROSCOPIC HYSTERECTOMY ;  Surgeon: Nathaniel Riley DO;  [facility];  Service: Gynecology    [place] PART EXCIS PLANTAR FASCIA Left 2019    Procedure: LEFT  INSTEP PLANTAR FASCIOTOMY;  Surgeon: Wan Suarez DPM;  Location: [address] [place] [address] MAIN OR;  Service: Podiatry    [place] WRIST Raul Josephan LIG Right 2016    Procedure: ENDOSCOPIC CARPAL TUNNEL RELEASE;  Surgeon: Flora Hu MD;  [facility];  Service: Orthopedics    SALPINGECTOMY Bilateral 2016    Procedure: REMOVAL OF BOTH FALLOPIAN TUBES;  Surgeon: Nathaniel Riley DO;  [facility];  Service:    Malena Cortez LIGATION       Social History     Substance and Sexual Activity   Alcohol Use Yes    Alcohol/week: 2 0 standard drinks    Types: 2 Glasses of wine per week    Comment: occasional alcohol use     Social History     Substance and Sexual Activity   Drug Use Never     Social History     Tobacco Use   Smoking Status Never Smoker   Smokeless Tobacco Never Used       Family History:  Family History   Problem Relation Age of Onset    Cancer Mother     Diabetes Mother     Uterine cancer Mother     Obesity Mother     COPD Father     Hypothyroidism Sister     Scleroderma Sister     No Known Problems Sister     Atrial fibrillation Brother     Scleroderma Brother     No Known Problems Son     No Known Problems Daughter     No Known Problems Daughter     Heart failure Maternal Grandmother     Heart failure Maternal Grandfather     Heart attack Paternal Grandmother     No Known Problems Paternal Grandfather          Meds/Allergies     Prior to Admission medications    Medication Sig Start Date End Date Taking?  Authorizing Provider   atorvastatin (LIPITOR) 20 mg tablet TAKE ONE TABLET BY MOUTH EVERY DAY 12/28/21  Yes Christa Espinal MD   Cranberry-Vit C-Lactobacillus (CRANBERRY/PROBIOTIC/VIT C) 450-30 MG TABS Take 1 tablet by mouth daily   Yes Historical Provider, MD   estradiol (ESTRACE) 0 5 MG tablet Take 1 tablet (0 5 mg total) by mouth daily 1/10/22  Yes Darcy Álvarez DO   ipratropium (ATROVENT) 0 03 % nasal spray 2 sprays into each nostril every 12 (twelve) hours 6/17/22  Yes RICARDO Josue   Mag Aspart-Potassium Aspart (RA POTASSIUM/MAGNESIUM PO) Take by mouth   Yes Historical Provider, MD   meloxicam (MOBIC) 15 mg tablet TAKE ONE TABLET BY MOUTH EVERY DAY 6/27/22  Yes Orlando Garcia MD   metoprolol succinate (TOPROL-XL) 50 mg 24 hr tablet TAKE ONE TABLET BY MOUTH EVERY DAY 6/6/22  Yes Christa Espinal MD   olmesartan (BENICAR) 20 mg tablet TAKE ONE TABLET BY MOUTH EVERY DAY 5/2/22  Yes Cathy Raza MD Riaz   omeprazole (PriLOSEC OTC) 20 MG tablet Take 1 tablet (20 mg total) by mouth daily 3/6/20  Yes Susy Rendon MD   Progesterone 100 MG CAPS Take 1 capsule by mouth daily at bedtime 1/10/22  Yes Darcy Vazquez, DO       Allergies   Allergen Reactions    Levaquin [Levofloxacin] Anaphylaxis and Swelling     L         Vitals:    09/27/22 0807   BP: 142/80   BP Location: Right arm   Patient Position: Sitting   Cuff Size: Large   Pulse: 66   Temp: 97 6 °F (36 4 °C)   SpO2: 97%   Weight: 70 8 kg (156 lb)   Height: 5' 2" (1 575 m)       Body mass index is 28 53 kg/m²  6 pound weight loss in approximately 6+ months and 1 pound weight gain in approximately 2+ years    Physical Exam:    General Appearance:  Alert, cooperative, no distress, appears stated age and is moderately overweight  Head:  Normocephalic, without obvious abnormality, atraumatic   Eyes:  PERRL, conjunctiva/corneas clear, EOM's intact,   both eyes   Ears:  Normal TM's and external ear canals, both ears   Nose: Nares normal, septum midline, mucosa normal, no drainage or sinus tenderness   Throat: Lips, mucosa, and tongue normal; teeth and gums normal   Neck: Supple, symmetrical, trachea midline, no adenopathy, thyroid: not enlarged, symmetric, no tenderness/mass/nodules, no carotid bruit or JVD   Back:   Symmetric, no curvature, ROM normal, no CVA tenderness   Lungs:   Clear to auscultation bilaterally, respirations unlabored   Chest Wall:  No tenderness or deformity   Heart:  Regular rate and rhythm, S1, S2 normal, no murmur, rub or gallop   Abdomen:   Soft, non-tender, bowel sounds active all four quadrants,  no masses, no organomegaly  Mild to moderate abdominal obesity noted     Extremities: Extremities normal, atraumatic, no cyanosis or edema   Pulses: 2+ and symmetric   Skin: Skin showed normal color, texture, turgor and no rashes or lesions   Lymph nodes: Cervical, supraclavicular, and axillary nodes normal   Neurologic: Normal Cardiographics    ECG 09/27/22:    Normal sinus rhythm at 66 bpm  Normal EKG  Resolution of poor R-wave progression and LVH voltage compared to 03/22/2022    IMAGING:    No Chest XR results available for this patient            LAB REVIEW:      Lab Results   Component Value Date    SODIUM 137 09/16/2022    K 4 2 09/16/2022     09/16/2022    CO2 26 09/16/2022    ANIONGAP 8 03/19/2015    BUN 19 09/16/2022    CREATININE 1 00 09/16/2022    GLUCOSE 83 03/19/2015    GLUF 96 09/16/2022    CALCIUM 9 5 09/16/2022    AST 18 09/16/2022    ALT 33 09/16/2022    ALKPHOS 58 09/16/2022    PROT 7 7 03/19/2015    BILITOT 0 59 03/19/2015    EGFR 63 09/16/2022     Lab Results   Component Value Date    CHOLESTEROL 158 09/16/2022    CHOLESTEROL 169 04/07/2022    CHOLESTEROL 149 10/11/2021     Lab Results   Component Value Date    HDL 61 09/16/2022    HDL 60 04/07/2022    HDL 54 10/11/2021       Lab Results   Component Value Date    LDLCALC 72 09/16/2022    LDLCALC 66 04/07/2022    LDLCALC 50 10/11/2021     No components found for: [facility]  Lab Results   Component Value Date    TRIG 124 09/16/2022    TRIG 213 (H) 04/07/2022    TRIG 224 (H) 10/11/2021     A1c and estimated average glucose 04/07/2022:  5 4% and [id number] John Snider MD

## 2022-10-19 ENCOUNTER — DOCUMENTATION (OUTPATIENT)
Dept: CARDIOLOGY CLINIC | Facility: CLINIC | Age: 57
End: 2022-10-19

## 2022-10-19 NOTE — PROGRESS NOTES
Message sent to patient on 10/19/2022:    Mandi Timmons,    Your recent optimized average home blood pressures between 10/14 and 10/17/2022 were 135/83, which is still above target level of 129/79 or lower  Since I know you are currently engaging in lifestyle changes and are reluctant to start on additional medication, continue your exercise and weight loss program and do another set of blood pressures during the week prior to your next appointment on the blood pressure data sheet from our office and bring the readings with you  Hopefully, further weight loss and continued exercise will lower the pressures additionally  We will discuss that in depth at your next appointment  Let me know if you have any questions      Dr Nikolas Samano

## 2022-10-21 ENCOUNTER — OFFICE VISIT (OUTPATIENT)
Dept: OBGYN CLINIC | Facility: MEDICAL CENTER | Age: 57
End: 2022-10-21
Payer: COMMERCIAL

## 2022-10-21 VITALS
SYSTOLIC BLOOD PRESSURE: 165 MMHG | DIASTOLIC BLOOD PRESSURE: 86 MMHG | HEART RATE: 68 BPM | BODY MASS INDEX: 28.16 KG/M2 | WEIGHT: 153 LBS | HEIGHT: 62 IN
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
## 2022-10-21 DIAGNOSIS — M25.561 PAIN IN BOTH KNEES, UNSPECIFIED CHRONICITY: ICD-10-CM

## 2022-10-21 DIAGNOSIS — G89.29 CHRONIC PAIN OF RIGHT KNEE: ICD-10-CM

## 2022-10-21 DIAGNOSIS — M25.561 CHRONIC PAIN OF RIGHT KNEE: ICD-10-CM

## 2022-10-21 DIAGNOSIS — M25.562 PAIN IN BOTH KNEES, UNSPECIFIED CHRONICITY: ICD-10-CM

## 2022-10-21 DIAGNOSIS — M23.91 INTERNAL DERANGEMENT OF RIGHT KNEE: Primary | ICD-10-CM

## 2022-10-21 PROCEDURE — 99203 OFFICE O/P NEW LOW 30 MIN: CPT | Performed by: ORTHOPAEDIC SURGERY

## 2022-10-21 NOTE — PROGRESS NOTES
Chief complaint;  bilateral knee pain, right greater than left    History;  56-year-old adult female who presents the office secondary to right knee pain and to a lesser extent left knee arthralgia  Her discomfort occurs when the knee is flexed hyperflexed and or going up and down inclined planes and stairs  There seems to be a correlation between her knee pains onset and doing Lila  She relates that she has mechanical symptoms of catching and locking, she has no history of patellar subluxation or dislocation  She has no history of treatment to the knee surgically right or left  She has been recommended after right knee x-rays were done to start her begin the use of glucosamine  She does not require a Ace wrap or brace to her knees  Her symptoms have not improved over time  Her symptoms have not improved with home exercise program     Past Medical History:   Diagnosis Date   • Abnormal Pap smear of cervix     in the past    • Arthralgia 2020   • Arthritis    • Carpal tunnel syndrome     Right   • Functional heart murmur in     • GERD (gastroesophageal reflux disease)     no meds   • Heart murmur    • History of paroxysmal supraventricular tachycardia    • History of PSVT (paroxysmal supraventricular tachycardia) 3/9/2018   • History of urinary tract infection     recurrent in past    • Hyperlipidemia    • Hypertension    • Miscarriage    • Urinary tract infection    • Uterine leiomyoma     had hysterectomy   • Wears contact lenses    • Wears glasses        Past Surgical History:   Procedure Laterality Date   • HERNIA REPAIR      pt was 5months old   • HYSTERECTOMY     • LASER ABLATION N/A     vaginal lesion(s)   • MOLE EXCISION     • OOPHORECTOMY     • [place] ANKLE SCOPE,PLANTAR FASCIOTOMY Right 2017    Procedure: RELEASE FASCIA PLANTAR/FASCIOTOMY ENDOSCOPIC (EPF);   Surgeon: Mami Donahue DPM;  [facility];  Service: Podiatry   • TN CYSTOURETHROSCOPY N/A 2016    Procedure: CYSTOSCOPY;  Surgeon: Leslie White DO;  [facility];  Service: Gynecology   • [place] LAPAROSCOPY W TOT HYSTERECTUTERUS <=250 GRAM  W TUBE/OVARY N/A 1/12/2016    Procedure: TOTAL LAPAROSCOPIC HYSTERECTOMY ;  Surgeon: Leslie White DO;  [facility];  Service: Gynecology   • [place] PART EXCIS PLANTAR FASCIA Left 4/8/2019    Procedure: LEFT  INSTEP PLANTAR FASCIOTOMY;  Surgeon: Dann Henley DPM;  Location: [address] [place] [address] MAIN OR;  Service: Podiatry   • [place] WRIST Mandeep Narrow LIG Right 1/29/2016    Procedure: ENDOSCOPIC CARPAL TUNNEL RELEASE;  Surgeon: Jose Folye MD;  [facility];  Service: Orthopedics   • SALPINGECTOMY Bilateral 1/12/2016    Procedure: REMOVAL OF BOTH FALLOPIAN TUBES;  Surgeon: Leslie White DO;  [facility];  Service:    • TUBAL LIGATION         Family History   Problem Relation Age of Onset   • Cancer Mother    • Diabetes Mother    • Uterine cancer Mother    • Obesity Mother    • COPD Father    • Hypothyroidism Sister    • Scleroderma Sister    • No Known Problems Sister    • Atrial fibrillation Brother    • Scleroderma Brother    • No Known Problems Son    • No Known Problems Daughter    • No Known Problems Daughter    • Heart failure Maternal Grandmother    • Heart failure Maternal Grandfather    • Heart attack Paternal Grandmother    • No Known Problems Paternal Grandfather        Social History     Tobacco Use   • Smoking status: Never Smoker   • Smokeless tobacco: Never Used   Vaping Use   • Vaping Use: Never used   Substance Use Topics   • Alcohol use: Yes     Alcohol/week: 2 0 standard drinks     Types: 2 Glasses of wine per week     Comment: occasional alcohol use   • Drug use: Never     Exam;    Adult female who appears younger than her stated age  She was assisted by a female standby for gowning or draping appropriate to knee exam   She exhibits a valgus knee    She has patellofemoral grating through the range of motion arc  She has a positive Zhane's test  She has a positive Apley's grind test  She has a negative Lachman's and negative drawers test      X-rays; right knee series shows age-appropriate AP and lateral projections of the knee there is evidence of modest joint space narrowing  Impression;    Knee pain, right knee  Knee arthralgia  Internal derangement  Valgus knee, right knee      Plan;    She was offered an injection for the joint which she declined, and injection of steroid    She would benefit by an MRI to ascertain meniscal injury or flow all given her mechanical symptoms that have not improved with tincture of time  This was ordered at this time    We will see her in follow-up after this has been performed and interpreted

## 2022-10-25 ENCOUNTER — LAB REQUISITION (OUTPATIENT)
Dept: LAB | Facility: HOSPITAL | Age: 57
End: 2022-10-25

## 2022-10-25 DIAGNOSIS — D48.5 NEOPLASM OF UNCERTAIN BEHAVIOR OF SKIN: ICD-10-CM

## 2022-10-25 DIAGNOSIS — R20.8 OTHER DISTURBANCES OF SKIN SENSATION: ICD-10-CM

## 2022-10-31 ENCOUNTER — TELEPHONE (OUTPATIENT)
Dept: GASTROENTEROLOGY | Facility: CLINIC | Age: 57
End: 2022-10-31

## 2022-11-06 ENCOUNTER — OFFICE VISIT (OUTPATIENT)
Dept: URGENT CARE | Facility: MEDICAL CENTER | Age: 57
End: 2022-11-06

## 2022-11-06 VITALS
HEIGHT: 62 IN | HEART RATE: 115 BPM | BODY MASS INDEX: 28.16 KG/M2 | WEIGHT: 153 LBS | RESPIRATION RATE: 20 BRPM | DIASTOLIC BLOOD PRESSURE: 80 MMHG | TEMPERATURE: 99.9 F | OXYGEN SATURATION: 97 % | SYSTOLIC BLOOD PRESSURE: 150 MMHG

## 2022-11-06 DIAGNOSIS — B34.9 ACUTE VIRAL SYNDROME: Primary | ICD-10-CM

## 2022-11-06 RX ORDER — OSELTAMIVIR PHOSPHATE 75 MG/1
75 CAPSULE ORAL EVERY 12 HOURS SCHEDULED
Qty: 10 CAPSULE | Refills: 0 | Status: SHIPPED | OUTPATIENT
Start: 2022-11-06 | End: 2022-11-11

## 2022-11-06 RX ORDER — BENZONATATE 200 MG/1
200 CAPSULE ORAL 3 TIMES DAILY PRN
Qty: 20 CAPSULE | Refills: 0 | Status: SHIPPED | OUTPATIENT
Start: 2022-11-06

## 2022-11-06 NOTE — PROGRESS NOTES
[facility]        NAME: Kennedi Aviles is a 56 y o  female  : 1965    MRN: [id number]  DATE: 2022  TIME: 12:01 PM    Assessment and Plan   Acute viral syndrome [B34 9]  1  Acute viral syndrome  oseltamivir (TAMIFLU) 75 mg capsule    benzonatate (TESSALON) 200 MG capsule    Covid19 and INFLUENZA A/B PCR         Patient Instructions     1  Over-the-counter ibuprofen and/or acetaminophen as needed for pain or fever  2  Oxymetazoline nasal spray 2 sprays in each nostril every 12 hours for no more than the next 5 days as needed for nasal congestion  3  Over-the-counter guaifenesin as needed for mucus relief  4  Gargle salt water as needed for sore throat relief  5  Increase oral fluid consumption  6  Follow-up with primary care provider in 7 days for any persistent symptoms  7  If your PCR test comes back negative for flu, you may stop the Oseltamivir  Chief Complaint     Chief Complaint   Patient presents with   • Flu Symptoms     Fever, chills, body aches, headache, sore throat; HCW and exposed to influenza and rsv          History of Present Illness       79-year-old female patient with a 24 hour history of headache, fever, fatigue, congestion, cough, severe body aches, 1 episode of vomiting  Patient is a  in his in constant exposure to infectious diseases  She is vaccinated to both COVID and flu  Review of Systems   Review of Systems   Constitutional: Positive for fatigue and fever  HENT: Positive for congestion and sinus pressure  Negative for facial swelling, rhinorrhea, sinus pain and sore throat  Respiratory: Positive for cough  Cardiovascular: Negative for chest pain  Gastrointestinal: Positive for vomiting  Negative for abdominal pain  Musculoskeletal: Positive for myalgias  Skin: Negative for rash           Current Medications       Current Outpatient Medications:   •  atorvastatin (LIPITOR) 20 mg tablet, TAKE ONE TABLET BY MOUTH EVERY DAY, Disp: 90 tablet, Rfl: 3  •  benzonatate (TESSALON) 200 MG capsule, Take 1 capsule (200 mg total) by mouth 3 (three) times a day as needed for cough, Disp: 20 capsule, Rfl: 0  •  estradiol (ESTRACE) 0 5 MG tablet, Take 1 tablet (0 5 mg total) by mouth daily, Disp: 90 tablet, Rfl: 3  •  meloxicam (MOBIC) 15 mg tablet, TAKE ONE TABLET BY MOUTH EVERY DAY, Disp: 90 tablet, Rfl: 0  •  metoprolol succinate (TOPROL-XL) 50 mg 24 hr tablet, TAKE ONE TABLET BY MOUTH EVERY DAY, Disp: 90 tablet, Rfl: 3  •  olmesartan (BENICAR) 20 mg tablet, TAKE ONE TABLET BY MOUTH EVERY DAY, Disp: 90 tablet, Rfl: 3  •  omeprazole (PriLOSEC OTC) 20 MG tablet, Take 1 tablet (20 mg total) by mouth daily, Disp: 100 tablet, Rfl: 5  •  oseltamivir (TAMIFLU) 75 mg capsule, Take 1 capsule (75 mg total) by mouth every 12 (twelve) hours for 5 days, Disp: 10 capsule, Rfl: 0  •  Progesterone 100 MG CAPS, Take 1 capsule by mouth daily at bedtime, Disp: 90 capsule, Rfl: 3  •  Cranberry-Vit C-Lactobacillus (CRANBERRY/PROBIOTIC/VIT C) 450-30 MG TABS, Take 1 tablet by mouth daily, Disp: , Rfl:   •  ipratropium (ATROVENT) 0 03 % nasal spray, 2 sprays into each nostril every 12 (twelve) hours (Patient not taking: Reported on 2022), Disp: 30 mL, Rfl: 0  •  Mag Aspart-Potassium Aspart (RA POTASSIUM/MAGNESIUM PO), Take by mouth, Disp: , Rfl:     Current Allergies     Allergies as of 2022 - Reviewed 2022   Allergen Reaction Noted   • Levaquin [levofloxacin] Anaphylaxis and Swelling 2015            The following portions of the patient's history were reviewed and updated as appropriate: allergies, current medications, past family history, past medical history, past social history, past surgical history and problem list      Past Medical History:   Diagnosis Date   • Abnormal Pap smear of cervix     in the past    • Arthralgia 2020   • Arthritis    • Carpal tunnel syndrome     Right   • Functional heart murmur in     • GERD (gastroesophageal reflux disease)     no meds   • Heart murmur    • History of paroxysmal supraventricular tachycardia    • History of PSVT (paroxysmal supraventricular tachycardia) 3/9/2018   • History of urinary tract infection     recurrent in past    • Hyperlipidemia    • Hypertension    • Miscarriage    • Urinary tract infection    • Uterine leiomyoma     had hysterectomy   • Wears contact lenses    • Wears glasses        Past Surgical History:   Procedure Laterality Date   • HERNIA REPAIR      pt was 5months old   • HYSTERECTOMY     • LASER ABLATION N/A     vaginal lesion(s)   • MOLE EXCISION     • OOPHORECTOMY     • AL ANKLE SCOPE,PLANTAR FASCIOTOMY Right 7/21/2017    Procedure: RELEASE FASCIA PLANTAR/FASCIOTOMY ENDOSCOPIC (EPF);   Surgeon: Adryan Davis DPM;  [facility];  Service: Podiatry   • AL CYSTOURETHROSCOPY N/A 1/12/2016    Procedure: Kimois Copier;  Surgeon: Martin Bourne DO;  [facility];  Service: Gynecology   • AL LAPAROSCOPY W TOT HYSTERECTUTERUS <=250 GRAM  W TUBE/OVARY N/A 1/12/2016    Procedure: TOTAL LAPAROSCOPIC HYSTERECTOMY ;  Surgeon: Martin Bourne DO;  [facility];  Service: Gynecology   • AL PART EXCIS PLANTAR FASCIA Left 4/8/2019    Procedure: LEFT  INSTEP PLANTAR FASCIOTOMY;  Surgeon: Laila Nunez DPM;  Location: [address] [place] [address] MAIN OR;  Service: Podiatry   • AL WRIST Felizardo Tenzin LIG Right 1/29/2016    Procedure: ENDOSCOPIC CARPAL TUNNEL RELEASE;  Surgeon: Kasi Pérez MD;  [facility];  Service: Orthopedics   • SALPINGECTOMY Bilateral 1/12/2016    Procedure: REMOVAL OF BOTH FALLOPIAN TUBES;  Surgeon: Martin Bourne DO;  [facility];  Service:    • TUBAL LIGATION         Family History   Problem Relation Age of Onset   • Cancer Mother    • Diabetes Mother    • Uterine cancer Mother    • Obesity Mother    • COPD Father    • Hypothyroidism Sister    • Scleroderma Sister    • No Known Problems Sister    • Atrial fibrillation Brother • Scleroderma Brother    • No Known Problems Son    • No Known Problems Daughter    • No Known Problems Daughter    • Heart failure Maternal Grandmother    • Heart failure Maternal Grandfather    • Heart attack Paternal Grandmother    • No Known Problems Paternal Grandfather          Medications have been verified  Objective   /80   Pulse (!) 115   Temp 99 9 °F (37 7 °C) (Temporal)   Resp 20   Ht 5' 2" (1 575 m)   Wt 69 4 kg (153 lb)   SpO2 97%   BMI 27 98 kg/m²        Physical Exam     Physical Exam  Vitals and nursing note reviewed  Constitutional:       General: She is not in acute distress  Appearance: Normal appearance  She is well-developed  She is ill-appearing  She is not toxic-appearing  HENT:      Head: Normocephalic  Right Ear: Tympanic membrane normal       Left Ear: Tympanic membrane normal       Nose: Congestion present  Mouth/Throat:      Mouth: Mucous membranes are moist       Pharynx: Posterior oropharyngeal erythema present  No pharyngeal swelling  Cardiovascular:      Rate and Rhythm: Regular rhythm  Tachycardia present  Pulses: Normal pulses  Heart sounds: Normal heart sounds  Pulmonary:      Effort: Pulmonary effort is normal       Breath sounds: Normal breath sounds  Abdominal:      Tenderness: There is no abdominal tenderness  Musculoskeletal:         General: Normal range of motion  Cervical back: Normal range of motion  Skin:     General: Skin is warm and dry  Capillary Refill: Capillary refill takes less than 2 seconds  Neurological:      General: No focal deficit present  Mental Status: She is alert and oriented to person, place, and time     Psychiatric:         Mood and Affect: Mood normal          Behavior: Behavior normal

## 2022-11-06 NOTE — PATIENT INSTRUCTIONS
1  Over-the-counter ibuprofen and/or acetaminophen as needed for pain or fever  2  Oxymetazoline nasal spray 2 sprays in each nostril every 12 hours for no more than the next 5 days as needed for nasal congestion  3  Over-the-counter guaifenesin as needed for mucus relief  4  Gargle salt water as needed for sore throat relief  5  Increase oral fluid consumption  6  Follow-up with primary care provider in 7 days for any persistent symptoms  7  If your PCR test comes back negative for flu, you may stop the Oseltamivir

## 2022-11-06 NOTE — LETTER
November 6, 2022     Patient: Sacha Han   YOB: 1965   Date of Visit: 11/6/2022       To Whom it May Concern:    Sacha Han was seen in my clinic on 11/6/2022  She is to be excused from work pending her COVID/flu test     If you have any questions or concerns, please don't hesitate to call           Sincerely,          Michelle Hines PA-C        CC: No Recipients

## 2022-11-07 LAB
FLUAV RNA RESP QL NAA+PROBE: NEGATIVE
FLUBV RNA RESP QL NAA+PROBE: NEGATIVE
SARS-COV-2 RNA RESP QL NAA+PROBE: NEGATIVE

## 2022-11-12 ENCOUNTER — HOSPITAL ENCOUNTER (OUTPATIENT)
Dept: MRI IMAGING | Facility: HOSPITAL | Age: 57
Discharge: HOME | End: 2022-11-12
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
## 2022-11-12 DIAGNOSIS — M23.91 INTERNAL DERANGEMENT OF RIGHT KNEE: ICD-10-CM

## 2022-11-18 ENCOUNTER — OFFICE VISIT (OUTPATIENT)
Dept: OBGYN CLINIC | Facility: MEDICAL CENTER | Age: 57
End: 2022-11-18

## 2022-11-18 VITALS
DIASTOLIC BLOOD PRESSURE: 89 MMHG | BODY MASS INDEX: 28.16 KG/M2 | HEIGHT: 62 IN | SYSTOLIC BLOOD PRESSURE: 142 MMHG | WEIGHT: 153 LBS | HEART RATE: 75 BPM

## 2022-11-18 DIAGNOSIS — M25.561 CHRONIC PAIN OF RIGHT KNEE: Primary | ICD-10-CM

## 2022-11-18 DIAGNOSIS — G89.29 CHRONIC PAIN OF RIGHT KNEE: Primary | ICD-10-CM

## 2022-11-18 DIAGNOSIS — M17.11 PATELLOFEMORAL ARTHRITIS OF RIGHT KNEE: ICD-10-CM

## 2022-11-18 NOTE — PROGRESS NOTES
Assessment:  1  Chronic pain of right knee  Ambulatory referral to Physical Therapy      2  Patellofemoral arthritis of right knee  Ambulatory referral to Physical Therapy          Plan:  Right patellofemoral arthritis  Right kne MRI reviewed with patient displaying no meniscal tear yet patellofemoral arthritis  She should initiate physical therapy  The patient can follow up as needed and is welcome to return at any point with any new or old issue  To do next visit:  Return if symptoms worsen or fail to improve  The above stated was discussed in layman's terms and the patient expressed understanding  All questions were answered to the patient's satisfaction  Scribe Attestation    I,:  Dorina Whitmore am acting as a scribe while in the presence of the attending physician :       I,:  Vero Leal MD personally performed the services described in this documentation    as scribed in my presence :             Subjective:   Aylin Parkinson is a 56 y o  female who presents for follow up of right knee with MRI review  Today she complains of anterior central and posterior knee pain  She describes the posterior knee as a snapping pain  Prolonged sitting and waling incline/declne aggravates while rest alleviates          Review of systems negative unless otherwise specified in HPI    Past Medical History:   Diagnosis Date   • Abnormal Pap smear of cervix     in the past    • Arthralgia 2020   • Arthritis    • Carpal tunnel syndrome     Right   • Functional heart murmur in     • GERD (gastroesophageal reflux disease)     no meds   • Heart murmur    • History of paroxysmal supraventricular tachycardia    • History of PSVT (paroxysmal supraventricular tachycardia) 3/9/2018   • History of urinary tract infection     recurrent in past    • Hyperlipidemia    • Hypertension    • Miscarriage    • Urinary tract infection    • Uterine leiomyoma     had hysterectomy   • Wears contact lenses    • Wears glasses        Past Surgical History:   Procedure Laterality Date   • HERNIA REPAIR      pt was 5months old   • HYSTERECTOMY     • LASER ABLATION N/A     vaginal lesion(s)   • MOLE EXCISION     • OOPHORECTOMY     • FL ANKLE SCOPE,PLANTAR FASCIOTOMY Right 7/21/2017    Procedure: RELEASE FASCIA PLANTAR/FASCIOTOMY ENDOSCOPIC (EPF);   Surgeon: Loren Hashimoto, DPM;  [facility];  Service: Podiatry   • FL CYSTOURETHROSCOPY N/A 1/12/2016    Procedure: CYSTOSCOPY;  Surgeon: Debbie Schwartz DO;  [facility];  Service: Gynecology   • FL LAPAROSCOPY W TOT HYSTERECTUTERUS <=250 GRAM  W TUBE/OVARY N/A 1/12/2016    Procedure: TOTAL LAPAROSCOPIC HYSTERECTOMY ;  Surgeon: Debbie Schwartz DO;  [facility];  Service: Gynecology   • FL PART EXCIS PLANTAR FASCIA Left 4/8/2019    Procedure: LEFT  INSTEP PLANTAR FASCIOTOMY;  Surgeon: Andrea Wilkes DPM;  Location: [facility] MAIN OR;  Service: Podiatry   • FL WRIST Jinnie Footman LIG Right 1/29/2016    Procedure: ENDOSCOPIC CARPAL TUNNEL RELEASE;  Surgeon: Neda Dewitt MD;  [facility];  Service: Orthopedics   • SALPINGECTOMY Bilateral 1/12/2016    Procedure: REMOVAL OF BOTH FALLOPIAN TUBES;  Surgeon: Debbie Schwartz DO;  [facility];  Service:    • TUBAL LIGATION         Family History   Problem Relation Age of Onset   • Cancer Mother    • Diabetes Mother    • Uterine cancer Mother    • Obesity Mother    • COPD Father    • Hypothyroidism Sister    • Scleroderma Sister    • No Known Problems Sister    • Atrial fibrillation Brother    • Scleroderma Brother    • No Known Problems Son    • No Known Problems Daughter    • No Known Problems Daughter    • Heart failure Maternal Grandmother    • Heart failure Maternal Grandfather    • Heart attack Paternal Grandmother    • No Known Problems Paternal Grandfather        Social History     Occupational History   • Not on file   Tobacco Use   • Smoking status: Never   • Smokeless tobacco: Never Vaping Use   • Vaping Use: Never used   Substance and Sexual Activity   • Alcohol use:  Yes     Alcohol/week: 2 0 standard drinks     Types: 2 Glasses of wine per week     Comment: occasional alcohol use   • Drug use: Never   • Sexual activity: Yes     Partners: Male     Birth control/protection: Post-menopausal, Female Sterilization         Current Outpatient Medications:   •  atorvastatin (LIPITOR) 20 mg tablet, TAKE ONE TABLET BY MOUTH EVERY DAY, Disp: 90 tablet, Rfl: 3  •  Cranberry-Vit C-Lactobacillus (CRANBERRY/PROBIOTIC/VIT C) 450-30 MG TABS, Take 1 tablet by mouth daily, Disp: , Rfl:   •  estradiol (ESTRACE) 0 5 MG tablet, Take 1 tablet (0 5 mg total) by mouth daily, Disp: 90 tablet, Rfl: 3  •  Mag Aspart-Potassium Aspart (RA POTASSIUM/MAGNESIUM PO), Take by mouth, Disp: , Rfl:   •  meloxicam (MOBIC) 15 mg tablet, TAKE ONE TABLET BY MOUTH EVERY DAY, Disp: 90 tablet, Rfl: 0  •  metoprolol succinate (TOPROL-XL) 50 mg 24 hr tablet, TAKE ONE TABLET BY MOUTH EVERY DAY, Disp: 90 tablet, Rfl: 3  •  olmesartan (BENICAR) 20 mg tablet, TAKE ONE TABLET BY MOUTH EVERY DAY, Disp: 90 tablet, Rfl: 3  •  omeprazole (PriLOSEC OTC) 20 MG tablet, Take 1 tablet (20 mg total) by mouth daily, Disp: 100 tablet, Rfl: 5  •  Progesterone 100 MG CAPS, Take 1 capsule by mouth daily at bedtime, Disp: 90 capsule, Rfl: 3  •  benzonatate (TESSALON) 200 MG capsule, Take 1 capsule (200 mg total) by mouth 3 (three) times a day as needed for cough, Disp: 20 capsule, Rfl: 0  •  ipratropium (ATROVENT) 0 03 % nasal spray, 2 sprays into each nostril every 12 (twelve) hours (Patient not taking: Reported on 11/6/2022), Disp: 30 mL, Rfl: 0    Allergies   Allergen Reactions   • Levaquin [Levofloxacin] Anaphylaxis and Swelling     L            Vitals:    11/18/22 1446   BP: 142/89   Pulse: 75       Objective:  Physical exam  · General: Awake, Alert, Oriented  · Eyes: Pupils equal, round and reactive to light  · Heart: regular rate and rhythm  · Lungs: No audible wheezing  · Abdomen: soft                    Ortho Exam  Right knee:  No erythema or ecchymosis  No effusion or swelling  Normal strength  Good ROM with crepitus   Calf compartments soft and supple  Sensation intact  Toes are warm sensate and mobile        Diagnostics, reviewed and taken today if performed as documented: The attending physician has personally reviewed the pertinent films in PACS and interpretation is as follows:  Right knee MRI:  No meniscus tear  Tricompartmental arthritic changes  Procedures, if performed today:    Procedures    None performed      Portions of the record may have been created with voice recognition software  Occasional wrong word or "sound a like" substitutions may have occurred due to the inherent limitations of voice recognition software  Read the chart carefully and recognize, using context, where substitutions have occurred

## 2022-12-27 DIAGNOSIS — M25.50 ARTHRALGIA, UNSPECIFIED JOINT: ICD-10-CM

## 2022-12-27 RX ORDER — MELOXICAM 15 MG/1
TABLET ORAL
Qty: 90 TABLET | Refills: 0 | Status: SHIPPED | OUTPATIENT
Start: 2022-12-27

## 2023-01-15 DIAGNOSIS — E78.2 MIXED HYPERLIPIDEMIA: ICD-10-CM

## 2023-01-15 DIAGNOSIS — N95.1 SYMPTOMATIC MENOPAUSAL OR FEMALE CLIMACTERIC STATES: ICD-10-CM

## 2023-01-16 RX ORDER — ATORVASTATIN CALCIUM 20 MG/1
TABLET, FILM COATED ORAL
Qty: 90 TABLET | Refills: 0 | Status: SHIPPED | OUTPATIENT
Start: 2023-01-16

## 2023-01-17 RX ORDER — PROGESTERONE 100 MG/1
CAPSULE ORAL
Qty: 90 CAPSULE | Refills: 0 | Status: SHIPPED | OUTPATIENT
Start: 2023-01-17 | End: 2023-01-23 | Stop reason: SDUPTHER

## 2023-01-17 RX ORDER — ESTRADIOL 0.5 MG/1
TABLET ORAL
Qty: 90 TABLET | Refills: 0 | Status: SHIPPED | OUTPATIENT
Start: 2023-01-17 | End: 2023-01-23 | Stop reason: SDUPTHER

## 2023-01-23 ENCOUNTER — ANNUAL EXAM (OUTPATIENT)
Dept: OBGYN CLINIC | Facility: CLINIC | Age: 58
End: 2023-01-23

## 2023-01-23 VITALS
WEIGHT: 157 LBS | DIASTOLIC BLOOD PRESSURE: 84 MMHG | BODY MASS INDEX: 28.89 KG/M2 | SYSTOLIC BLOOD PRESSURE: 138 MMHG | HEIGHT: 62 IN

## 2023-01-23 DIAGNOSIS — Z12.31 ENCOUNTER FOR SCREENING MAMMOGRAM FOR MALIGNANT NEOPLASM OF BREAST: ICD-10-CM

## 2023-01-23 DIAGNOSIS — Z01.419 WOMEN'S ANNUAL ROUTINE GYNECOLOGICAL EXAMINATION: Primary | ICD-10-CM

## 2023-01-23 DIAGNOSIS — N95.1 SYMPTOMATIC MENOPAUSAL OR FEMALE CLIMACTERIC STATES: ICD-10-CM

## 2023-01-23 RX ORDER — ESTRADIOL 0.5 MG/1
0.5 TABLET ORAL DAILY
Qty: 90 TABLET | Refills: 3 | Status: SHIPPED | OUTPATIENT
Start: 2023-01-23

## 2023-01-23 RX ORDER — PROGESTERONE 100 MG/1
1 CAPSULE ORAL
Qty: 90 CAPSULE | Refills: 3 | Status: SHIPPED | OUTPATIENT
Start: 2023-01-23

## 2023-01-23 NOTE — PROGRESS NOTES
ASSESSMENT & PLAN:   Diagnoses and all orders for this visit:    Women's annual routine gynecological examination    Encounter for screening mammogram for malignant neoplasm of breast  -     Mammo screening bilateral w 3d & cad; Future    Symptomatic menopausal or female climacteric states  -     estradiol (ESTRACE) 0 5 MG tablet; Take 1 tablet (0 5 mg total) by mouth daily  -     Progesterone 100 MG CAPS; Take 1 capsule by mouth daily at bedtime          The following were reviewed in today's visit: ASCCP guidelines, Gardisil vaccination, STD testing breast self exam, mammography screening ordered, use and side effects of HRT, menopause, exercise and healthy diet  Patient to return to office in yearly for annual exam      All questions have been answered to her satisfaction  CC:  Annual Gynecologic Examination  Chief Complaint   Patient presents with   • Gynecologic Exam     Pt is here for her yearly exam pap no longer indicted and mammo is ordered  Pt doing well no concerns  HPI: Cali Shrestha is a 56 y o  [id number] who presents for annual gynecologic examination  She has the following concerns:  None         Health Maintenance:    Exercise: frequently  Breast exams/breast awareness: no  Diet: trying to eat smaller portions  Last mammogram: 22  Colorectal cancer screening: due this year       Past Medical History:   Diagnosis Date   • Abnormal Pap smear of cervix     in the past    • Arthralgia 2020   • Arthritis    • Carpal tunnel syndrome     Right   • Functional heart murmur in     • GERD (gastroesophageal reflux disease)     no meds   • Heart murmur    • History of paroxysmal supraventricular tachycardia    • History of PSVT (paroxysmal supraventricular tachycardia) 3/9/2018   • History of urinary tract infection     recurrent in past    • Hyperlipidemia    • Hypertension    • Miscarriage    • Urinary tract infection    • Uterine leiomyoma     had hysterectomy   • Wears contact lenses    • Wears glasses        Past Surgical History:   Procedure Laterality Date   • HERNIA REPAIR      pt was 5months old   • HYSTERECTOMY     • LASER ABLATION N/A     vaginal lesion(s)   • MOLE EXCISION     • OOPHORECTOMY     • CA CYSTOURETHROSCOPY N/A 1/12/2016    Procedure: CYSTOSCOPY;  Surgeon: Darby Keys DO;  [facility];  Service: Gynecology   • CA ENDOSCOPIC PLANTAR FASCIOTOMY Right 7/21/2017    Procedure: RELEASE FASCIA PLANTAR/FASCIOTOMY ENDOSCOPIC (EPF); Surgeon: Cal Peterson DPM;  [facility];  Service: Podiatry   • CA FASCIECTOMY PLANTAR FASCIA PARTIAL SPX Left 4/8/2019    Procedure: LEFT  INSTEP PLANTAR FASCIOTOMY;  Surgeon: Sanchez Robin DPM;  Location: [address] [place] [address] MAIN OR;  Service: Podiatry   • CA LAPS TOTAL HYSTERECT 250 GM/< W/RMVL TUBE/OVARY N/A 1/12/2016    Procedure: TOTAL LAPAROSCOPIC HYSTERECTOMY ;  Surgeon: Darby Keys DO;  [facility];  Service: Gynecology   • CA [address] [facility] WRST SURG W/RLS TRANSVRS CARPL LIGM Right 1/29/2016    Procedure: ENDOSCOPIC CARPAL TUNNEL RELEASE;  Surgeon: Panchito Hammer MD;  [facility];  Service: Orthopedics   • SALPINGECTOMY Bilateral 1/12/2016    Procedure: REMOVAL OF BOTH FALLOPIAN TUBES;  Surgeon: Darby Keys DO;  [facility];  Service:    • TUBAL LIGATION         Past OB/Gyn History:   No LMP recorded  Patient has had a hysterectomy  Menopausal status: postmenopausal  Menopausal symptoms: None    Last Pap: 2015 : no abnormalities, neg HPV  History of abnormal Pap smear: once, repeat was normal      Patient is currently sexually active         Family History  Family History   Problem Relation Age of Onset   • Cancer Mother    • Diabetes Mother    • Uterine cancer Mother    • Obesity Mother    • COPD Father    • Hypothyroidism Sister    • Scleroderma Sister    • No Known Problems Sister    • Atrial fibrillation Brother    • Scleroderma Brother    • No Known Problems Son    • No Known Problems Daughter    • No Known Problems Daughter    • Heart failure Maternal Grandmother    • Heart failure Maternal Grandfather    • Heart attack Paternal Grandmother    • No Known Problems Paternal Grandfather        Family history of uterine or ovarian cancer: yes  Family history of breast cancer: no  Family history of colon cancer: no    Social History:  Social History     Socioeconomic History   • Marital status:      Spouse name: Not on file   • Number of children: Not on file   • Years of education: Not on file   • Highest education level: Not on file   Occupational History   • Not on file   Tobacco Use   • Smoking status: Never   • Smokeless tobacco: Never   Vaping Use   • Vaping Use: Never used   Substance and Sexual Activity   • Alcohol use: Yes     Alcohol/week: 2 0 standard drinks     Types: 2 Glasses of wine per week     Comment: occasional alcohol use   • Drug use: Never   • Sexual activity: Yes     Partners: Male     Birth control/protection: Post-menopausal, Female Sterilization   Other Topics Concern   • Not on file   Social History Narrative    Per allscripts;     Caffeine use    Coffee    Exercise: Walking         Social Determinants of Health     Financial Resource Strain: Not on file   Food Insecurity: Not on file   Transportation Needs: Not on file   Physical Activity: Not on file   Stress: Not on file   Social Connections: Not on file   Intimate Partner Violence: Not on file   Housing Stability: Not on file     Domestic violence screen: negative    Allergies:   Allergies   Allergen Reactions   • Levaquin [Levofloxacin] Anaphylaxis and Swelling     L       Medications:    Current Outpatient Medications:   •  atorvastatin (LIPITOR) 20 mg tablet, TAKE ONE TABLET BY MOUTH EVERY DAY, Disp: 90 tablet, Rfl: 0  •  Cranberry-Vit C-Lactobacillus (CRANBERRY/PROBIOTIC/VIT C) 450-30 MG TABS, Take 1 tablet by mouth daily, Disp: , Rfl:   •  estradiol (ESTRACE) 0 5 MG tablet, Take 1 tablet (0 5 mg total) by mouth daily, Disp: 90 tablet, Rfl: 3  •  Mag Aspart-Potassium Aspart (RA POTASSIUM/MAGNESIUM PO), Take by mouth, Disp: , Rfl:   •  meloxicam (MOBIC) 15 mg tablet, TAKE ONE TABLET BY MOUTH EVERY DAY, Disp: 90 tablet, Rfl: 0  •  metoprolol succinate (TOPROL-XL) 50 mg 24 hr tablet, TAKE ONE TABLET BY MOUTH EVERY DAY, Disp: 90 tablet, Rfl: 3  •  olmesartan (BENICAR) 20 mg tablet, TAKE ONE TABLET BY MOUTH EVERY DAY, Disp: 90 tablet, Rfl: 3  •  omeprazole (PriLOSEC OTC) 20 MG tablet, Take 1 tablet (20 mg total) by mouth daily, Disp: 100 tablet, Rfl: 5  •  Progesterone 100 MG CAPS, Take 1 capsule by mouth daily at bedtime, Disp: 90 capsule, Rfl: 3  •  ipratropium (ATROVENT) 0 03 % nasal spray, 2 sprays into each nostril every 12 (twelve) hours (Patient not taking: Reported on 11/6/2022), Disp: 30 mL, Rfl: 0    Review of Systems:  Review of Systems   Constitutional: Negative for chills and fever  Respiratory: Negative for cough and shortness of breath  Cardiovascular: Positive for palpitations (no change)  Negative for chest pain  Gastrointestinal: Positive for abdominal distention (at times)  Negative for abdominal pain, blood in stool, constipation, nausea and vomiting  Genitourinary: Positive for vaginal pain (occasional dryness)  Negative for difficulty urinating, dyspareunia, dysuria, frequency, pelvic pain, urgency, vaginal bleeding and vaginal discharge  Neurological: Negative for headaches  Physical Exam:  /84 (BP Location: Right arm, Patient Position: Sitting, Cuff Size: Standard)   Ht 5' 2" (1 575 m)   Wt 71 2 kg (157 lb)   BMI 28 72 kg/m²    Physical Exam  Constitutional:       General: She is awake  Appearance: Normal appearance  She is well-developed  Genitourinary:      Vulva and bladder normal       Right Labia: No rash, tenderness or lesions  Left Labia: No tenderness, lesions or rash  No vaginal discharge, erythema, tenderness or bleeding        No vaginal prolapse present  Mild vaginal atrophy present  Right Adnexa: not tender, not full and no mass present  Left Adnexa: not tender, not full and no mass present  Cervix is absent  Uterus is absent  No urethral prolapse present  Bladder is not tender  Pelvic exam was performed with patient in the lithotomy position  Breasts:     Right: Normal  No inverted nipple, mass, nipple discharge, skin change or tenderness  Left: Normal  No inverted nipple, mass, nipple discharge, skin change or tenderness  HENT:      Head: Normocephalic and atraumatic  Cardiovascular:      Rate and Rhythm: Normal rate and regular rhythm  Heart sounds: Normal heart sounds  Pulmonary:      Effort: Pulmonary effort is normal  No tachypnea or respiratory distress  Breath sounds: Normal breath sounds  Abdominal:      General: There is no distension  Palpations: Abdomen is soft  Tenderness: There is no abdominal tenderness  There is no guarding  Musculoskeletal:      Cervical back: Neck supple  Lymphadenopathy:      Upper Body:      Right upper body: No supraclavicular or axillary adenopathy  Left upper body: No supraclavicular or axillary adenopathy  Neurological:      General: No focal deficit present  Mental Status: She is alert and oriented to person, place, and time  Psychiatric:         Mood and Affect: Mood normal          Behavior: Behavior normal          Thought Content: Thought content normal          Judgment: Judgment normal    Vitals reviewed

## 2023-02-02 ENCOUNTER — TELEPHONE (OUTPATIENT)
Dept: FAMILY MEDICINE CLINIC | Facility: MEDICAL CENTER | Age: 58
End: 2023-02-02

## 2023-02-02 NOTE — TELEPHONE ENCOUNTER
Pt started yesterday with pain above one eye, watery eyes, and congestion  Pt has only taken ibuprofen  Pt is covid vaccinated, but has not done an at home test   Pt is at work

## 2023-02-02 NOTE — TELEPHONE ENCOUNTER
Hold for patient's return call  Triaged- patient is c/o worsening eyeball pain, pressure and watering  Nasal congestion  Patient has an Ophthalmologist  I asked her to reached out to her Eye doctor first for this complaint should be addressed first   Patient understands instructions and will call back with appt information   Aware we can surely evaluate her symptoms when cleared by ophthalmology

## 2023-04-02 DIAGNOSIS — M25.50 ARTHRALGIA, UNSPECIFIED JOINT: ICD-10-CM

## 2023-04-03 DIAGNOSIS — E78.2 MIXED HYPERLIPIDEMIA: ICD-10-CM

## 2023-04-03 RX ORDER — MELOXICAM 15 MG/1
TABLET ORAL
Qty: 90 TABLET | Refills: 0 | Status: SHIPPED | OUTPATIENT
Start: 2023-04-03

## 2023-04-03 RX ORDER — ATORVASTATIN CALCIUM 20 MG/1
TABLET, FILM COATED ORAL
Qty: 90 TABLET | Refills: 0 | Status: SHIPPED | OUTPATIENT
Start: 2023-04-03

## 2023-04-23 DIAGNOSIS — I10 UNCONTROLLED HYPERTENSION: ICD-10-CM

## 2023-04-24 RX ORDER — OLMESARTAN MEDOXOMIL 20 MG/1
TABLET ORAL
Qty: 90 TABLET | Refills: 0 | Status: SHIPPED | OUTPATIENT
Start: 2023-04-24 | End: 2023-04-24

## 2023-05-03 ENCOUNTER — APPOINTMENT (OUTPATIENT)
Dept: LAB | Facility: CLINIC | Age: 58
End: 2023-05-03

## 2023-05-03 DIAGNOSIS — Z00.8 ENCOUNTER FOR OTHER GENERAL EXAMINATION: Primary | ICD-10-CM

## 2023-05-03 DIAGNOSIS — I10 ESSENTIAL HYPERTENSION: ICD-10-CM

## 2023-05-03 DIAGNOSIS — E66.3 OVERWEIGHT: ICD-10-CM

## 2023-05-03 LAB
ALBUMIN SERPL BCP-MCNC: 3.8 G/DL (ref 3.5–5)
ALP SERPL-CCNC: 48 U/L (ref 46–116)
ALT SERPL W P-5'-P-CCNC: 21 U/L (ref 12–78)
ANION GAP SERPL CALCULATED.3IONS-SCNC: 2 MMOL/L (ref 4–13)
AST SERPL W P-5'-P-CCNC: 16 U/L (ref 5–45)
BILIRUB SERPL-MCNC: 0.42 MG/DL (ref 0.2–1)
BUN SERPL-MCNC: 20 MG/DL (ref 5–25)
CALCIUM SERPL-MCNC: 9.1 MG/DL (ref 8.3–10.1)
CHLORIDE SERPL-SCNC: 108 MMOL/L (ref 96–108)
CHOLEST SERPL-MCNC: 168 MG/DL
CO2 SERPL-SCNC: 26 MMOL/L (ref 21–32)
CREAT SERPL-MCNC: 0.9 MG/DL (ref 0.6–1.3)
GFR SERPL CREATININE-BSD FRML MDRD: 72 ML/MIN/1.73SQ M
GLUCOSE P FAST SERPL-MCNC: 92 MG/DL (ref 65–99)
HDLC SERPL-MCNC: 59 MG/DL
LDLC SERPL CALC-MCNC: 68 MG/DL (ref 0–100)
NONHDLC SERPL-MCNC: 109 MG/DL
POTASSIUM SERPL-SCNC: 4 MMOL/L (ref 3.5–5.3)
PROT SERPL-MCNC: 7.2 G/DL (ref 6.4–8.4)
SODIUM SERPL-SCNC: 136 MMOL/L (ref 135–147)
TRIGL SERPL-MCNC: 205 MG/DL

## 2023-05-05 LAB
EST. AVERAGE GLUCOSE BLD GHB EST-MCNC: 100 MG/DL
HBA1C MFR BLD: 5.1 %

## 2023-05-10 ENCOUNTER — OFFICE VISIT (OUTPATIENT)
Dept: CARDIOLOGY CLINIC | Facility: CLINIC | Age: 58
End: 2023-05-10

## 2023-05-10 VITALS
DIASTOLIC BLOOD PRESSURE: 84 MMHG | WEIGHT: 155 LBS | OXYGEN SATURATION: 98 % | SYSTOLIC BLOOD PRESSURE: 140 MMHG | HEART RATE: 64 BPM | BODY MASS INDEX: 28.52 KG/M2 | HEIGHT: 62 IN

## 2023-05-10 DIAGNOSIS — I49.1 PREMATURE ATRIAL CONTRACTIONS: ICD-10-CM

## 2023-05-10 DIAGNOSIS — Z86.79 HISTORY OF PSVT (PAROXYSMAL SUPRAVENTRICULAR TACHYCARDIA): ICD-10-CM

## 2023-05-10 DIAGNOSIS — I10 UNCONTROLLED HYPERTENSION: Primary | ICD-10-CM

## 2023-05-10 DIAGNOSIS — M17.11 PATELLOFEMORAL ARTHRITIS OF RIGHT KNEE: ICD-10-CM

## 2023-05-10 DIAGNOSIS — R00.2 INTERMITTENT PALPITATIONS: ICD-10-CM

## 2023-05-10 DIAGNOSIS — G43.709 CHRONIC MIGRAINE WITHOUT AURA WITHOUT STATUS MIGRAINOSUS, NOT INTRACTABLE: ICD-10-CM

## 2023-05-10 DIAGNOSIS — I10 ESSENTIAL HYPERTENSION: ICD-10-CM

## 2023-05-10 DIAGNOSIS — E66.3 OVERWEIGHT (BMI 25.0-29.9): ICD-10-CM

## 2023-05-10 DIAGNOSIS — Z86.16 HISTORY OF COVID-19: ICD-10-CM

## 2023-05-10 DIAGNOSIS — E78.2 MIXED DYSLIPIDEMIA: ICD-10-CM

## 2023-05-10 RX ORDER — LANOLIN ALCOHOL/MO/W.PET/CERES
1 CREAM (GRAM) TOPICAL 3 TIMES DAILY
COMMUNITY

## 2023-05-10 NOTE — PROGRESS NOTES
Office Cardiology Progress Note  Jeana Glaser 56 y o  female MRN: [id number]  05/10/23  10:14 AM      ASSESSMENT:       1  Mildly uncontrolled essential hypertension on today's office visit with previous history of white coat hypertension followed by confirmed essential hypertension on home blood pressure measurements in October, 2021  Most recent average home blood pressure was 135/83 between 10/14 and 10/17/2022 and previously was 133/85 in April, 2022  Target blood pressure is 129/79 or less     2  Recurrent brief palpitations, with  previous flare up 4 1+ years  ago, and history of AVNRT/PSVT diagnosed in 2005  3  Mixed dyslipidemia with recurrence of residual increase in triglycerides and  2 8% 10 year and previous 38 8% lifetime ASCVD risk  4  History of chest pain with normal nuclear stress test on 9/29/16   5  29 8 pound weight gain since January, 2015 and  10 pound weight gain in approximately 4 1+ years  6   Moderate overweight with no weight change in approximately  2 6+ years with 7 pound weight loss in the past 1 1+ years  7   Current suppression of mildly symptomatic atrial bigeminy/trigeminy,  as of today with previous brief palpitations  Benign Holter monitor 03/16/2021 with 309 PACs and 550 episodes of atrial bigeminy  8   Resolved “cotton wool” exudates on  previous eye examination and history of bilateral iritis,  treated with steroid eyedrops    9   Infrequent whooshing sensation in head with previous uncontrolled hypertension thought to be a possible  cause  10  Stable frequency of migraine headaches to once every two weeks  Most recent four day episode was several months ago  11  History of COVID-19 infection confirmed on 02/11/2022  Plan       Patient Instructions     1  Starting during the next time you have a weekend off, do blood pressures at home for 4 consecutive days in the morning and in the evening as directed below    2  Take blood pressures after resting for at least 15 minutes while seated in a chair or at a table with arm supported on arm rest or table  Do 3 readings, one after the other 1-2 minutes apart, both morning and evening for 4 consecutive days  3  Write down each and every reading on blood pressure data sheet with date and time and get list of readings to the office of Dr Jonathan Garcia for his review  Please email the blood pressure data sheet as an attachment to the following email address: [email or web address]  4  Our office will contact you with further instructions and the results of this review  Call us if you have not heard from us within a week of sending us the blood pressure information  5  We recommend you start on the physical therapy for leg strengthening exercises to reduce the chronic knee pain by strengthening the quadricep, hamstring, tibial and calf musculature  You can then continue these exercises at home, once you have learned them  6  Continue present medication for now  There is no reason you cannot use collagen in terms of drug interactions  7  Cardiology follow-up approximately 6 months with EKG  HPI    This 56 y o  female  denies new cardiopulmonary and medical symptoms  The patient did not do any home blood pressure readings prior to today's visit, as previously recommended on 10/19/2022  The patient has occasional brief palpitations, occasional migraine headaches which occur infrequent, occasional whooshing sound in her head and no other new issues  She was diagnosed as having patellofemoral arthritis of the right knee by orthopedics and was recommended to have physical therapy, which she has not yet started  She seems to be looking for a work from home job and 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    • Uncontrolled hypertension Yes   • Essential hypertension    • History of PSVT (paroxysmal supraventricular tachycardia)    • Intermittent palpitations    • Mixed dyslipidemia    • Overweight (BMI 25 0-29  9)    • Premature atrial contractions    • Chronic migraine without aura without status migrainosus, not intractable    • History of COVID-19    • Patellofemoral arthritis of right knee         Review of Systems    All other systems negative, except as noted in history of present illness    Historical Information   Past Medical History:   Diagnosis Date   • Abnormal Pap smear of cervix     in the past    • Arthralgia 2020   • Arthritis    • Carpal tunnel syndrome     Right   • Functional heart murmur in     • GERD (gastroesophageal reflux disease)     no meds   • Heart murmur    • History of paroxysmal supraventricular tachycardia    • History of PSVT (paroxysmal supraventricular tachycardia) 3/9/2018   • History of urinary tract infection     recurrent in past    • Hyperlipidemia    • Hypertension    • Miscarriage    • Urinary tract infection    • Uterine leiomyoma     had hysterectomy   • Wears contact lenses    • Wears glasses      Past Surgical History:   Procedure Laterality Date   • HERNIA REPAIR      pt was 5months old   • HYSTERECTOMY     • LASER ABLATION N/A     vaginal lesion(s)   • MOLE EXCISION     • OOPHORECTOMY     • MA CYSTOURETHROSCOPY N/A 2016    Procedure: CYSTOSCOPY;  Surgeon: Peter Chavez DO;  [facility];  Service: Gynecology   • MA ENDOSCOPIC PLANTAR FASCIOTOMY Right 2017    Procedure: RELEASE FASCIA PLANTAR/FASCIOTOMY ENDOSCOPIC (EPF);   Surgeon: Aldo Kim DPM;  Location: [facility];  Service: Podiatry   • MA FASCIECTOMY PLANTAR FASCIA PARTIAL SPX Left 2019    Procedure: LEFT  INSTEP PLANTAR FASCIOTOMY;  Surgeon: Heather Almodovar DPM;  Location: [address] [place] [address] MAIN OR;  Service: Podiatry   • MA LAPS TOTAL HYSTERECT 250 GM/< W/RMVL TUBE/OVARY N/A 2016    Procedure: TOTAL LAPAROSCOPIC HYSTERECTOMY ;  Surgeon: Peter Chavez DO;  [facility];  Service: Gynecology   • [place] [address] WRST SURG W/RLS TRANSVRS CARPL LIGM Right 1/29/2016    Procedure: ENDOSCOPIC CARPAL TUNNEL RELEASE;  Surgeon: Martin Maxwell MD;  [facility];  Service: Orthopedics   • SALPINGECTOMY Bilateral 1/12/2016    Procedure: REMOVAL OF BOTH FALLOPIAN TUBES;  Surgeon: Jonathan Bella DO;  Location[facility];  Service:    • TUBAL LIGATION       Social History     Substance and Sexual Activity   Alcohol Use Yes   • Alcohol/week: 2 0 standard drinks   • Types: 2 Glasses of wine per week    Comment: occasional alcohol use     Social History     Substance and Sexual Activity   Drug Use Never     Social History     Tobacco Use   Smoking Status Never   Smokeless Tobacco Never       Family History:  Family History   Problem Relation Age of Onset   • Cancer Mother    • Diabetes Mother    • Uterine cancer Mother    • Obesity Mother    • COPD Father    • Hypothyroidism Sister    • Scleroderma Sister    • No Known Problems Sister    • Atrial fibrillation Brother    • Scleroderma Brother    • No Known Problems Son    • No Known Problems Daughter    • No Known Problems Daughter    • Heart failure Maternal Grandmother    • Heart failure Maternal Grandfather    • Heart attack Paternal Grandmother    • No Known Problems Paternal Grandfather          Meds/Allergies     Prior to Admission medications    Medication Sig Start Date End Date Taking?  Authorizing Provider   atorvastatin (LIPITOR) 20 mg tablet TAKE ONE TABLET BY MOUTH EVERY DAY 4/3/23  Yes Estuardo Montanez MD   Cranberry-Vit C-Lactobacillus (CRANBERRY/PROBIOTIC/VIT C) 450-30 MG TABS Take 1 tablet by mouth daily   Yes Historical Provider, MD   estradiol (ESTRACE) 0 5 MG tablet Take 1 tablet (0 5 mg total) by mouth daily 1/23/23  Yes Darcy Álvarez DO   glucosamine-chondroitin 500-400 MG tablet Take 1 tablet by mouth 3 (three) times a day   Yes Historical Provider, MD   meloxicam (MOBIC) 15 mg tablet TAKE ONE TABLET BY "MOUTH EVERY DAY 4/3/23  Yes James Batista MD   metoprolol succinate (TOPROL-XL) 50 mg 24 hr tablet TAKE ONE TABLET BY MOUTH EVERY DAY 6/6/22  Yes Tonja Yates MD   olmesartan (BENICAR) 20 mg tablet TAKE ONE TABLET BY MOUTH EVERY DAY 4/24/23  Yes Tonja Yates MD   omeprazole (PriLOSEC OTC) 20 MG tablet Take 1 tablet (20 mg total) by mouth daily 3/6/20  Yes Tonja Yates MD   Progesterone 100 MG CAPS Take 1 capsule by mouth daily at bedtime 1/23/23  Yes Darcy Fierro , DO   TURMERIC CURCUMIN PO Take by mouth   Yes Historical Provider, MD   ipratropium (ATROVENT) 0 03 % nasal spray 2 sprays into each nostril every 12 (twelve) hours  Patient not taking: Reported on 11/6/2022 6/17/22 5/10/23  RICARDO Cevallos   Mag Aspart-Potassium Aspart (RA POTASSIUM/MAGNESIUM PO) Take by mouth  5/10/23  Historical Provider, MD       Allergies   Allergen Reactions   • Levaquin [Levofloxacin] Anaphylaxis and Swelling     L         Vitals:    05/10/23 0801   BP: 140/84   BP Location: Left arm   Patient Position: Sitting   Cuff Size: Standard   Pulse: 64   SpO2: 98%   Weight: 70 3 kg (155 lb)   Height: 5' 2\" (1 575 m)       Body mass index is 28 35 kg/m²  1 pound weight loss in approximately 7-1/2 months with 7 pound weight loss in approximately 1 1+ year gain and no change in weight in approximately 2 6+ years    Physical Exam:    General Appearance:  Alert, cooperative, no distress, appears stated age and is moderately overweight     Head:  Normocephalic, without obvious abnormality, atraumatic   Eyes:  PERRL, conjunctiva/corneas clear, EOM's intact,   both eyes   Ears:  Normal TM's and external ear canals, both ears   Nose: Nares normal, septum midline, mucosa normal, no drainage or sinus tenderness   Throat: Lips, mucosa, and tongue normal; teeth and gums normal   Neck: Supple, symmetrical, trachea midline, no adenopathy, thyroid: not enlarged, symmetric, no tenderness/mass/nodules, no carotid bruit or JVD   Back:   " Symmetric, no curvature, ROM normal, no CVA tenderness   Lungs:   Clear to auscultation bilaterally, respirations unlabored   Chest Wall:  No tenderness or deformity   Heart:  Regular rate and rhythm, S1, S2 normal, no murmur, rub or gallop   Abdomen:   Soft, non-tender, bowel sounds active all four quadrants,  no masses, no organomegaly  Mild to moderate abdominal obesity noted  Extremities: Extremities normal, atraumatic, no cyanosis or edema   Pulses: 2+ and symmetric   Skin: Skin showed normal color, texture, turgor and no rashes or lesions   Lymph nodes: Cervical, supraclavicular, and axillary nodes normal   Neurologic: Normal         Cardiographics    ECG 05/10/23:    Normal sinus rhythm at 64 bpm  Poor R wave progression  RV conduction delay  Normal ECG, essentially unchanged from 9/27/2022    IMAGING:    No Chest XR results available for this patient            LAB REVIEW:      Lab Results   Component Value Date    SODIUM 136 05/03/2023    K 4 0 05/03/2023     05/03/2023    CO2 26 05/03/2023    ANIONGAP 8 03/19/2015    BUN 20 05/03/2023    CREATININE 0 90 05/03/2023    GLUCOSE 83 03/19/2015    GLUF 92 05/03/2023    CALCIUM 9 1 05/03/2023    AST 16 05/03/2023    ALT 21 05/03/2023    ALKPHOS 48 05/03/2023    PROT 7 7 03/19/2015    BILITOT 0 59 03/19/2015    EGFR 72 05/03/2023     Lab Results   Component Value Date    CHOLESTEROL 168 05/03/2023    CHOLESTEROL 158 09/16/2022    CHOLESTEROL 169 04/07/2022     Lab Results   Component Value Date    HDL 59 05/03/2023    HDL 61 09/16/2022    HDL 60 04/07/2022       Lab Results   Component Value Date    LDLCALC 68 05/03/2023    LDLCALC 72 09/16/2022    LDLCALC 66 04/07/2022     No components found for: [facility]  Lab Results   Component Value Date    TRIG 205 (H) 05/03/2023    TRIG 124 09/16/2022    TRIG 213 (H) 04/07/2022     A1c and estimated average glucose 5/3/2023: 5 1% and 100, normal          Jus Abutro MD

## 2023-05-10 NOTE — PATIENT INSTRUCTIONS
Starting during the next time you have a weekend off, do blood pressures at home for 4 consecutive days in the morning and in the evening as directed below  Take blood pressures after resting for at least 15 minutes while seated in a chair or at a table with arm supported on arm rest or table  Do 3 readings, one after the other 1-2 minutes apart, both morning and evening for 4 consecutive days  Write down each and every reading on blood pressure data sheet with date and time and get list of readings to the office of Dr Chana Acevedo for his review  Please email the blood pressure data sheet as an attachment to the following email address: [email or web address]  Our office will contact you with further instructions and the results of this review  Call us if you have not heard from us within a week of sending us the blood pressure information  We recommend you start on the physical therapy for leg strengthening exercises to reduce the chronic knee pain by strengthening the quadricep, hamstring, tibial and calf musculature  You can then continue these exercises at home, once you have learned them  Continue present medication for now  There is no reason you cannot use collagen in terms of drug interactions  Cardiology follow-up approximately 6 months with EKG

## 2023-05-15 ENCOUNTER — HOSPITAL ENCOUNTER (OUTPATIENT)
Dept: RADIOLOGY | Facility: MEDICAL CENTER | Age: 58
Discharge: HOME | End: 2023-05-15

## 2023-05-15 VITALS — WEIGHT: 155 LBS | BODY MASS INDEX: 28.52 KG/M2 | HEIGHT: 62 IN

## 2023-05-15 DIAGNOSIS — Z12.31 ENCOUNTER FOR SCREENING MAMMOGRAM FOR MALIGNANT NEOPLASM OF BREAST: ICD-10-CM

## 2023-05-26 ENCOUNTER — DOCUMENTATION (OUTPATIENT)
Dept: CARDIOLOGY CLINIC | Facility: CLINIC | Age: 58
End: 2023-05-26

## 2023-05-26 DIAGNOSIS — I10 UNCONTROLLED HYPERTENSION: Primary | ICD-10-CM

## 2023-05-26 RX ORDER — CHLORTHALIDONE 25 MG/1
TABLET ORAL
Qty: 16 TABLET | Refills: 5 | Status: SHIPPED | OUTPATIENT
Start: 2023-05-26

## 2023-05-26 NOTE — PROGRESS NOTES
Received home blood pressure readings taken between 5/20 and 5/23/2023  Average home blood pressure was 137/80 with target blood pressure of 129/79 or less  Recommended to patient by message on this date that she started on chlorthalidone 25 mg once daily 4 days a week every Monday, Wednesday, Friday, Sunday  Recommended home blood pressure readings about 4 weeks after starting on this medication  Tyra Spence MD

## 2023-06-04 DIAGNOSIS — I47.1 PSVT (PAROXYSMAL SUPRAVENTRICULAR TACHYCARDIA) (HCC): ICD-10-CM

## 2023-06-05 RX ORDER — METOPROLOL SUCCINATE 50 MG/1
TABLET, EXTENDED RELEASE ORAL
Qty: 90 TABLET | Refills: 0 | Status: SHIPPED | OUTPATIENT
Start: 2023-06-05

## 2023-07-02 DIAGNOSIS — M25.50 ARTHRALGIA, UNSPECIFIED JOINT: ICD-10-CM

## 2023-07-03 RX ORDER — MELOXICAM 15 MG/1
TABLET ORAL
Qty: 90 TABLET | Refills: 0 | Status: SHIPPED | OUTPATIENT
Start: 2023-07-03

## 2023-07-09 DIAGNOSIS — E78.2 MIXED HYPERLIPIDEMIA: ICD-10-CM

## 2023-07-10 DIAGNOSIS — E78.2 MIXED HYPERLIPIDEMIA: ICD-10-CM

## 2023-07-10 RX ORDER — ATORVASTATIN CALCIUM 20 MG/1
TABLET, FILM COATED ORAL
Qty: 90 TABLET | Refills: 0 | Status: SHIPPED | OUTPATIENT
Start: 2023-07-10

## 2023-07-10 RX ORDER — ATORVASTATIN CALCIUM 20 MG/1
TABLET, FILM COATED ORAL
Qty: 90 TABLET | Refills: 0 | Status: SHIPPED | OUTPATIENT
Start: 2023-07-10 | End: 2023-07-10

## 2023-07-11 ENCOUNTER — DOCUMENTATION (OUTPATIENT)
Dept: CARDIOLOGY CLINIC | Facility: CLINIC | Age: 58
End: 2023-07-11

## 2023-07-11 NOTE — PROGRESS NOTES
Received home blood pressure data from patient with readings taken between 7/6 and 7/9/2023. Average overall blood pressure for that timeframe was 127/77. This is markedly improved from previous average blood pressure of 137/80 between 5/20 and 5/23/2023. The patient will be messaged with these results and encouraged to continue on the same regimen.

## 2023-07-12 DIAGNOSIS — I10 UNCONTROLLED HYPERTENSION: ICD-10-CM

## 2023-07-13 RX ORDER — CHLORTHALIDONE 25 MG/1
TABLET ORAL
Qty: 16 TABLET | Refills: 0 | Status: SHIPPED | OUTPATIENT
Start: 2023-07-13

## 2023-08-03 DIAGNOSIS — I10 UNCONTROLLED HYPERTENSION: ICD-10-CM

## 2023-08-03 RX ORDER — CHLORTHALIDONE 25 MG/1
TABLET ORAL
Qty: 360 TABLET | Refills: 3 | Status: SHIPPED | OUTPATIENT
Start: 2023-08-03

## 2023-09-10 DIAGNOSIS — I47.1 PSVT (PAROXYSMAL SUPRAVENTRICULAR TACHYCARDIA): ICD-10-CM

## 2023-09-11 RX ORDER — METOPROLOL SUCCINATE 50 MG/1
TABLET, EXTENDED RELEASE ORAL
Qty: 90 TABLET | Refills: 0 | Status: SHIPPED | OUTPATIENT
Start: 2023-09-11

## 2023-09-13 ENCOUNTER — OFFICE VISIT (OUTPATIENT)
Dept: FAMILY MEDICINE CLINIC | Facility: MEDICAL CENTER | Age: 58
End: 2023-09-13
Payer: COMMERCIAL

## 2023-09-13 VITALS
WEIGHT: 161 LBS | HEIGHT: 62 IN | HEART RATE: 74 BPM | BODY MASS INDEX: 29.63 KG/M2 | SYSTOLIC BLOOD PRESSURE: 142 MMHG | DIASTOLIC BLOOD PRESSURE: 82 MMHG | OXYGEN SATURATION: 98 %

## 2023-09-13 DIAGNOSIS — K63.5 COLORECTAL POLYPS: ICD-10-CM

## 2023-09-13 DIAGNOSIS — I47.1 PSVT (PAROXYSMAL SUPRAVENTRICULAR TACHYCARDIA): ICD-10-CM

## 2023-09-13 DIAGNOSIS — M85.859 OSTEOPENIA OF NECK OF FEMUR, UNSPECIFIED LATERALITY: ICD-10-CM

## 2023-09-13 DIAGNOSIS — K62.1 COLORECTAL POLYPS: ICD-10-CM

## 2023-09-13 DIAGNOSIS — I10 ESSENTIAL HYPERTENSION: ICD-10-CM

## 2023-09-13 DIAGNOSIS — E78.00 PURE HYPERCHOLESTEROLEMIA: ICD-10-CM

## 2023-09-13 DIAGNOSIS — Z00.00 PREVENTATIVE HEALTH CARE: Primary | ICD-10-CM

## 2023-09-13 DIAGNOSIS — Z12.11 SCREENING FOR MALIGNANT NEOPLASM OF COLON: ICD-10-CM

## 2023-09-13 DIAGNOSIS — K21.9 GASTROESOPHAGEAL REFLUX DISEASE WITHOUT ESOPHAGITIS: ICD-10-CM

## 2023-09-13 PROCEDURE — 99213 OFFICE O/P EST LOW 20 MIN: CPT | Performed by: FAMILY MEDICINE

## 2023-09-13 PROCEDURE — 99396 PREV VISIT EST AGE 40-64: CPT | Performed by: FAMILY MEDICINE

## 2023-09-13 NOTE — PROGRESS NOTES
ADULT ANNUAL [facility]    NAME: Stan Garibay  AGE: 66 y.o. SEX: female  : 1965     DATE: 2023     Assessment and Plan:     Problem List Items Addressed This Visit        Digestive    Gastroesophageal reflux disease without esophagitis     She is taking omeprazole 20 mg daily. It has been effective and well-tolerated. Continue omeprazole, continue attempts to lose weight, dietary modifications as needed. Cardiovascular and Mediastinum    Essential hypertension     Blood pressure today is 142/82. She is taking chlorthalidone, Benicar and metoprolol. Metoprolol has a dual purpose for palpitations. Continue those medication, continue regular exercise, which she does, continue trying to lose weight and low-salt diet. PSVT (paroxysmal supraventricular tachycardia) ([address])     This is well controlled with metoprolol. Continue metoprolol tartrate continue follow-up with myself and cardiology. Musculoskeletal and Integument    Osteopenia of neck of femur     I recommended calcium and vitamin D and weightbearing exercise, which she is doing. Other    Pure hypercholesterolemia     Her last LDL was 68. This was a couple months ago. She is taking atorvastatin 20 mg. Continue atorvastatin, continue low-fat diet, continue exercise and continue attempts at losing some weight. Other Visit Diagnoses     Preventative health care    -  Primary    Colorectal polyps        Relevant Orders    Ambulatory referral to Gastroenterology    Screening for malignant neoplasm of colon        Relevant Orders    Ambulatory referral to Gastroenterology          Immunizations and preventive care screenings were discussed with patient today. Appropriate education was printed on patient's after visit summary.     Counseling:  Alcohol/drug use: discussed moderation in alcohol intake, the recommendations for healthy alcohol use, and avoidance of illicit drug use. Exercise: the importance of regular exercise/physical activity was discussed. Recommend exercise 3-5 times per week for at least 30 minutes. BMI Counseling: Body mass index is 29.45 kg/m². The BMI is above normal. Nutrition recommendations include decreasing portion sizes, consuming healthier snacks and moderation in carbohydrate intake. Exercise recommendations include moderate physical activity 150 minutes/week and exercising 3-5 times per week. Rationale for BMI follow-up plan is due to patient being overweight or obese. Depression Screening and Follow-up Plan: Patient was screened for depression during today's encounter. They screened negative with a PHQ-2 score of 0. No follow-ups on file. Chief Complaint:     Chief Complaint   Patient presents with   • Annual Exam      History of Present Illness:     Adult Annual Physical   Patient here for a comprehensive physical exam. The patient reports no problems. This is a delightful 51-year-old woman who lives with her boyfriend of many years. She has been  and she has 2 adult children from the first marriage. She works as a  at [employer]. She is due for colonoscopy and I give her a referral.  She is up-to-date with mammograms and DEXA's. Diet and Physical Activity  Diet/Nutrition: well balanced diet. Exercise: moderate cardiovascular exercise, 5-7 times a week on average and 30-60 minutes on average. Depression Screening  PHQ-2/9 Depression Screening    Little interest or pleasure in doing things: 0 - not at all  Feeling down, depressed, or hopeless: 0 - not at all  PHQ-2 Score: 0  PHQ-2 Interpretation: Negative depression screen       General Health  Sleep: sleeps poorly. Hearing: normal - bilateral.  Vision: no vision problems. Dental: regular dental visits. She sees GYN regularly.      Review of Systems:     Review of Systems Constitutional: Negative for chills and fever. HENT: Negative for ear pain, postnasal drip, rhinorrhea, sore throat and trouble swallowing. Eyes: Negative for pain, redness and visual disturbance. Respiratory: Negative for cough and shortness of breath. Cardiovascular: Negative for chest pain and palpitations. Gastrointestinal: Negative for abdominal pain, constipation, diarrhea and vomiting. Genitourinary: Negative for dysuria, frequency, hematuria and urgency. Musculoskeletal: Negative for arthralgias, back pain, joint swelling and myalgias. Skin: Negative for color change and rash. Neurological: Negative for seizures and syncope. Hematological: Negative for adenopathy. Psychiatric/Behavioral: Negative for decreased concentration, dysphoric mood and sleep disturbance. The patient is not nervous/anxious. All other systems reviewed and are negative.      Past Medical History:     Past Medical History:   Diagnosis Date   • Abnormal Pap smear of cervix     in the past    • Arthralgia 2020   • Arthritis    • Carpal tunnel syndrome     Right   • Functional heart murmur in     • GERD (gastroesophageal reflux disease)     no meds   • Heart murmur    • History of paroxysmal supraventricular tachycardia    • History of PSVT (paroxysmal supraventricular tachycardia) 3/9/2018   • History of urinary tract infection     recurrent in past    • Hyperlipidemia    • Hypertension    • Miscarriage    • Urinary tract infection    • Uterine leiomyoma     had hysterectomy   • Wears contact lenses    • Wears glasses       Past Surgical History:     Past Surgical History:   Procedure Laterality Date   • HERNIA REPAIR      pt was 5months old   • HYSTERECTOMY     • LASER ABLATION N/A     vaginal lesion(s)   • MOLE EXCISION     • OOPHORECTOMY     • PA CYSTOURETHROSCOPY N/A 2016    Procedure: CYSTOSCOPY;  Surgeon: Lamonte Garay DO;  [facility];  Service: Gynecology   • PA ENDOSCOPIC PLANTAR FASCIOTOMY Right 7/21/2017    Procedure: RELEASE FASCIA PLANTAR/FASCIOTOMY ENDOSCOPIC (EPF); Surgeon: Christina Reagan DPM;  [facility];  Service: Podiatry   • MT FASCIECTOMY PLANTAR FASCIA PARTIAL SPX Left 4/8/2019    Procedure: LEFT  INSTEP PLANTAR FASCIOTOMY;  Surgeon: Hilda Burroughs DPM;  Location: [address] [place] [address] MAIN OR;  Service: Podiatry   • MT LAPS TOTAL HYSTERECT 250 GM/< W/RMVL TUBE/OVARY N/A 1/12/2016    Procedure: TOTAL LAPAROSCOPIC HYSTERECTOMY ;  Surgeon: Blayne Cardoso DO;  [facility];  Service: Gynecology   • [place] [address] WRST SURG W/RLS TRANSVRS CARPL LIGM Right 1/29/2016    Procedure: ENDOSCOPIC CARPAL TUNNEL RELEASE;  Surgeon: Ethel Green MD;  [facility];  Service: Orthopedics   • SALPINGECTOMY Bilateral 1/12/2016    Procedure: REMOVAL OF BOTH FALLOPIAN TUBES;  Surgeon: Blayne Cardoso DO;  [facility];  Service:    • TUBAL LIGATION        Social History:     Social History     Socioeconomic History   • Marital status:      Spouse name: None   • Number of children: None   • Years of education: None   • Highest education level: None   Occupational History   • None   Tobacco Use   • Smoking status: Never   • Smokeless tobacco: Never   Vaping Use   • Vaping Use: Never used   Substance and Sexual Activity   • Alcohol use:  Yes     Alcohol/week: 2.0 standard drinks of alcohol     Types: 2 Glasses of wine per week     Comment: occasional alcohol use   • Drug use: Never   • Sexual activity: Yes     Partners: Male     Birth control/protection: Post-menopausal, Female Sterilization   Other Topics Concern   • None   Social History Narrative    Per allscripts;     Caffeine use    Coffee    Exercise: Walking         Social Determinants of Health     Financial Resource Strain: Not on file   Food Insecurity: Not on file   Transportation Needs: Not on file   Physical Activity: Not on file   Stress: Not on file   Social Connections: Not on file   Intimate Partner Violence: Not on file   Housing Stability: Not on file      Family History:     Family History   Problem Relation Age of Onset   • Cancer Mother         rectal   • Diabetes Mother    • Uterine cancer Mother    • Obesity Mother    • COPD Father    • Hypothyroidism Sister    • Scleroderma Sister    • No Known Problems Sister    • No Known Problems Daughter    • No Known Problems Daughter    • Heart failure Maternal Grandmother    • Heart failure Maternal Grandfather    • Heart attack Paternal Grandmother    • No Known Problems Paternal Grandfather    • Atrial fibrillation Brother    • Scleroderma Brother    • No Known Problems Son       Current Medications:     Current Outpatient Medications   Medication Sig Dispense Refill   • atorvastatin (LIPITOR) 20 mg tablet TAKE ONE TABLET BY MOUTH EVERY DAY 90 tablet 0   • chlorthalidone 25 mg tablet Take 1 tablet 4 days a week every Monday, Wednesday, Friday, and Sunday. 360 tablet 3   • Cranberry-Vit C-Lactobacillus (CRANBERRY/PROBIOTIC/VIT C) 450-30 MG TABS Take 1 tablet by mouth daily     • estradiol (ESTRACE) 0.5 MG tablet Take 1 tablet (0.5 mg total) by mouth daily 90 tablet 3   • meloxicam (MOBIC) 15 mg tablet TAKE ONE TABLET BY MOUTH EVERY DAY 90 tablet 0   • metoprolol succinate (TOPROL-XL) 50 mg 24 hr tablet TAKE ONE TABLET BY MOUTH EVERY DAY 90 tablet 0   • olmesartan (BENICAR) 20 mg tablet TAKE ONE TABLET BY MOUTH EVERY DAY 90 tablet 3   • omeprazole (PriLOSEC OTC) 20 MG tablet Take 1 tablet (20 mg total) by mouth daily 100 tablet 5   • Progesterone 100 MG CAPS Take 1 capsule by mouth daily at bedtime 90 capsule 3   • TURMERIC CURCUMIN PO Take by mouth       No current facility-administered medications for this visit. Allergies:      Allergies   Allergen Reactions   • Levaquin [Levofloxacin] Anaphylaxis and Swelling     L      Physical Exam:     /82 (BP Location: Left arm, Patient Position: Sitting, Cuff Size: Large)   Pulse 74   Ht 5' 2" (1.575 m)   Wt 73 kg (161 lb)   SpO2 98%   BMI 29.45 kg/m²     Physical Exam  Vitals and nursing note reviewed. Constitutional:       Appearance: Normal appearance. She is well-developed. HENT:      Head: Normocephalic. Right Ear: Tympanic membrane and ear canal normal.      Left Ear: Tympanic membrane and ear canal normal.      Nose: Nose normal. No mucosal edema, congestion or rhinorrhea. Mouth/Throat:      Mouth: Mucous membranes are moist.      Pharynx: Uvula midline. No oropharyngeal exudate. Tonsils: No tonsillar exudate. Eyes:      General: Lids are normal.      Conjunctiva/sclera: Conjunctivae normal.      Pupils: Pupils are equal, round, and reactive to light. Neck:      Thyroid: No thyromegaly. Vascular: No carotid bruit. Trachea: Trachea normal.   Cardiovascular:      Rate and Rhythm: Normal rate and regular rhythm. Pulses: Normal pulses. Heart sounds: Normal heart sounds, S1 normal and S2 normal. No murmur heard. Pulmonary:      Effort: Pulmonary effort is normal. No respiratory distress. Breath sounds: Normal breath sounds. No wheezing, rhonchi or rales. Abdominal:      General: Bowel sounds are normal.      Palpations: Abdomen is soft. Tenderness: There is no abdominal tenderness. Hernia: No hernia is present. Musculoskeletal:         General: No swelling or deformity. Normal range of motion. Cervical back: Normal range of motion. Lymphadenopathy:      Cervical: No cervical adenopathy. Skin:     General: Skin is warm and dry. Findings: No rash. Neurological:      General: No focal deficit present. Mental Status: She is alert and oriented to person, place, and time. Mental status is at baseline. Cranial Nerves: No cranial nerve deficit. Sensory: No sensory deficit. Coordination: Coordination normal.      Deep Tendon Reflexes: Reflexes are normal and symmetric.    Psychiatric:         Mood and Affect: Mood normal. Speech: Speech normal.         Behavior: Behavior normal. Behavior is cooperative. Thought Content:  Thought content normal.         Judgment: Judgment normal.          Gautam Alvarado MD  [address] [facility]

## 2023-09-13 NOTE — ASSESSMENT & PLAN NOTE
This is well controlled with metoprolol. Continue metoprolol tartrate continue follow-up with myself and cardiology.

## 2023-09-13 NOTE — ASSESSMENT & PLAN NOTE
Blood pressure today is 142/82. She is taking chlorthalidone, Benicar and metoprolol. Metoprolol has a dual purpose for palpitations. Continue those medication, continue regular exercise, which she does, continue trying to lose weight and low-salt diet.

## 2023-09-13 NOTE — ASSESSMENT & PLAN NOTE
She is taking omeprazole 20 mg daily. It has been effective and well-tolerated. Continue omeprazole, continue attempts to lose weight, dietary modifications as needed.

## 2023-09-13 NOTE — ASSESSMENT & PLAN NOTE
Her last LDL was 68. This was a couple months ago. She is taking atorvastatin 20 mg. Continue atorvastatin, continue low-fat diet, continue exercise and continue attempts at losing some weight.

## 2023-09-29 ENCOUNTER — IMMUNIZATIONS (OUTPATIENT)
Dept: FAMILY MEDICINE CLINIC | Facility: MEDICAL CENTER | Age: 58
End: 2023-09-29
Payer: COMMERCIAL

## 2023-09-29 DIAGNOSIS — Z23 IMMUNIZATION DUE: Primary | ICD-10-CM

## 2023-09-29 PROCEDURE — 90686 IIV4 VACC NO PRSV 0.5 ML IM: CPT

## 2023-09-29 PROCEDURE — 90471 IMMUNIZATION ADMIN: CPT

## 2023-10-02 DIAGNOSIS — M25.50 ARTHRALGIA, UNSPECIFIED JOINT: ICD-10-CM

## 2023-10-02 RX ORDER — MELOXICAM 15 MG/1
TABLET ORAL
Qty: 90 TABLET | Refills: 0 | Status: SHIPPED | OUTPATIENT
Start: 2023-10-02

## 2023-10-08 DIAGNOSIS — E78.2 MIXED HYPERLIPIDEMIA: ICD-10-CM

## 2023-10-09 RX ORDER — ATORVASTATIN CALCIUM 20 MG/1
TABLET, FILM COATED ORAL
Qty: 90 TABLET | Refills: 0 | Status: SHIPPED | OUTPATIENT
Start: 2023-10-09

## 2023-11-24 ENCOUNTER — PREP FOR PROCEDURE (OUTPATIENT)
Age: 58
End: 2023-11-24

## 2023-11-24 ENCOUNTER — TELEPHONE (OUTPATIENT)
Age: 58
End: 2023-11-24

## 2023-11-24 DIAGNOSIS — Z12.11 SCREENING FOR COLON CANCER: Primary | ICD-10-CM

## 2023-11-24 NOTE — TELEPHONE ENCOUNTER
Scheduled date of colonoscopy (as of today): 1/29/24  Physician performing colonoscopy: Jayden  Location of colonoscopy: [facility]   Bowel prep reviewed with patient: Yes  Instructions sent to [address] [place] [address] by: Ivonne Boucher    Referring Provider     Pre- Screening: There is no height or weight on file to calculate BMI. Has patient been referred for a routine screening Colonoscopy? yes  Is the patient between 43-73 years old? yes      Previous Colonoscopy yes   If yes:    Date: 2019    Facility:  [facility]    Reason: screening      SCHEDULING STAFF: If the patient is between 45yrs-49yrs, please advise patient to confirm benefits/coverage with their insurance company for a routine screening colonoscopy, some insurance carriers will only cover at [address] [place] [address], [facility] or older. If the patient is over 66years old, please schedule an office visit. Does the patient want to see a Gastroenterologist prior to their procedure OR are they having any GI symptoms? no    Has the patient been hospitalized or had abdominal surgery in the past 6 months? no    Does the patient use supplemental oxygen? no    Does the patient take Coumadin, Lovenox, Plavix, Elliquis, Xarelto, or other blood thinning medication? no    Has the patient had a stroke, cardiac event, or stent placed in the past year? no    SCHEDULING STAFF: If patient answers NO to above questions, then schedule procedure. If patient answers YES to above questions, then schedule office appointment. If patient is between 45yrs - 49yrs, please advise patient that we will have to confirm benefits & coverage with their insurance company for a routine screening colonoscopy.

## 2023-12-11 DIAGNOSIS — I47.10 PSVT (PAROXYSMAL SUPRAVENTRICULAR TACHYCARDIA): ICD-10-CM

## 2023-12-12 RX ORDER — METOPROLOL SUCCINATE 50 MG/1
TABLET, EXTENDED RELEASE ORAL
Qty: 90 TABLET | Refills: 0 | Status: SHIPPED | OUTPATIENT
Start: 2023-12-12

## 2023-12-31 DIAGNOSIS — M25.50 ARTHRALGIA, UNSPECIFIED JOINT: ICD-10-CM

## 2024-01-02 DIAGNOSIS — Z00.6 ENCOUNTER FOR EXAMINATION FOR NORMAL COMPARISON OR CONTROL IN CLINICAL RESEARCH PROGRAM: ICD-10-CM

## 2024-01-02 RX ORDER — MELOXICAM 15 MG/1
TABLET ORAL
Qty: 90 TABLET | Refills: 1 | Status: SHIPPED | OUTPATIENT
Start: 2024-01-02

## 2024-01-16 ENCOUNTER — APPOINTMENT (OUTPATIENT)
Dept: LAB | Facility: MEDICAL CENTER | Age: 59
End: 2024-01-16
Payer: COMMERCIAL

## 2024-01-16 ENCOUNTER — OFFICE VISIT (OUTPATIENT)
Dept: CARDIOLOGY CLINIC | Facility: MEDICAL CENTER | Age: 59
End: 2024-01-16
Payer: COMMERCIAL

## 2024-01-16 VITALS — HEART RATE: 78 BPM | SYSTOLIC BLOOD PRESSURE: 132 MMHG | DIASTOLIC BLOOD PRESSURE: 78 MMHG

## 2024-01-16 DIAGNOSIS — E78.2 MIXED DYSLIPIDEMIA: Primary | ICD-10-CM

## 2024-01-16 DIAGNOSIS — E78.2 MIXED DYSLIPIDEMIA: ICD-10-CM

## 2024-01-16 DIAGNOSIS — I10 UNCONTROLLED HYPERTENSION: ICD-10-CM

## 2024-01-16 DIAGNOSIS — I10 ESSENTIAL HYPERTENSION: ICD-10-CM

## 2024-01-16 DIAGNOSIS — Z00.6 ENCOUNTER FOR EXAMINATION FOR NORMAL COMPARISON OR CONTROL IN CLINICAL RESEARCH PROGRAM: ICD-10-CM

## 2024-01-16 DIAGNOSIS — I47.10 PSVT (PAROXYSMAL SUPRAVENTRICULAR TACHYCARDIA): ICD-10-CM

## 2024-01-16 DIAGNOSIS — E78.2 MIXED HYPERLIPIDEMIA: ICD-10-CM

## 2024-01-16 LAB
ALBUMIN SERPL BCP-MCNC: 4.5 G/DL (ref 3.5–5)
ALP SERPL-CCNC: 45 U/L (ref 34–104)
ALT SERPL W P-5'-P-CCNC: 28 U/L (ref 7–52)
ANION GAP SERPL CALCULATED.3IONS-SCNC: 8 MMOL/L
AST SERPL W P-5'-P-CCNC: 23 U/L (ref 13–39)
BILIRUB SERPL-MCNC: 0.52 MG/DL (ref 0.2–1)
BUN SERPL-MCNC: 16 MG/DL (ref 5–25)
CALCIUM SERPL-MCNC: 9.9 MG/DL (ref 8.4–10.2)
CHLORIDE SERPL-SCNC: 99 MMOL/L (ref 96–108)
CHOLEST SERPL-MCNC: 171 MG/DL
CO2 SERPL-SCNC: 30 MMOL/L (ref 21–32)
CREAT SERPL-MCNC: 0.95 MG/DL (ref 0.6–1.3)
GFR SERPL CREATININE-BSD FRML MDRD: 67 ML/MIN/1.73SQ M
GLUCOSE P FAST SERPL-MCNC: 91 MG/DL (ref 65–99)
HDLC SERPL-MCNC: 69 MG/DL
LDLC SERPL CALC-MCNC: 71 MG/DL (ref 0–100)
POTASSIUM SERPL-SCNC: 3.9 MMOL/L (ref 3.5–5.3)
PROT SERPL-MCNC: 7.1 G/DL (ref 6.4–8.4)
SODIUM SERPL-SCNC: 137 MMOL/L (ref 135–147)
TRIGL SERPL-MCNC: 155 MG/DL

## 2024-01-16 PROCEDURE — 36415 COLL VENOUS BLD VENIPUNCTURE: CPT

## 2024-01-16 PROCEDURE — 80061 LIPID PANEL: CPT

## 2024-01-16 PROCEDURE — 80053 COMPREHEN METABOLIC PANEL: CPT

## 2024-01-16 PROCEDURE — 99214 OFFICE O/P EST MOD 30 MIN: CPT | Performed by: INTERNAL MEDICINE

## 2024-01-16 RX ORDER — ATORVASTATIN CALCIUM 20 MG/1
20 TABLET, FILM COATED ORAL DAILY
Qty: 90 TABLET | Refills: 3 | Status: SHIPPED | OUTPATIENT
Start: 2024-01-16

## 2024-01-16 RX ORDER — CHLORTHALIDONE 25 MG/1
TABLET ORAL
Qty: 360 TABLET | Refills: 3 | Status: SHIPPED | OUTPATIENT
Start: 2024-01-16

## 2024-01-16 NOTE — PATIENT INSTRUCTIONS
Recommendations:  Continue current medications.  Check fasting lipid panel and complete metabolic profile.  Follow up in 6 months.

## 2024-01-16 NOTE — PROGRESS NOTES
Cardiology   Liliana Chavez 58 y.o. female MRN: [id number]        Impression:  PSVT - unchanged over past several years.  On b-blockers. Does have palpitations.    Hypertension - controlled.   Dyslipidemia - on statin.     Recommendations:  Continue current medications.  Check fasting lipid panel and complete metabolic profile.  Follow up in 6 months.       HPI: Liliana Chavez is a 58 y.o. year old female with history of PSVT, hypertension, and dyslipidemia, who presents for follow up.  Previously saw Dr. Mello, but is switching due to Dr. Mello retiring.  Has episodes of palpitations several times a month lasting minutes.  Unchanged.  No chest pain or shortness of breath.          Review of Systems   Constitutional: Negative.    HENT: Negative.     Eyes: Negative.    Respiratory:  Negative for chest tightness and shortness of breath.    Cardiovascular:  Negative for chest pain, palpitations and leg swelling.   Gastrointestinal: Negative.    Endocrine: Negative.    Genitourinary: Negative.    Musculoskeletal: Negative.    Skin: Negative.    Allergic/Immunologic: Negative.    Neurological: Negative.    Hematological: Negative.    Psychiatric/Behavioral: Negative.     All other systems reviewed and are negative.        Past Medical History:   Diagnosis Date    Abnormal Pap smear of cervix     in the past     Arthralgia 2020    Arthritis     Carpal tunnel syndrome     Right    Functional heart murmur in      GERD (gastroesophageal reflux disease)     no meds    Heart murmur     History of paroxysmal supraventricular tachycardia     History of PSVT (paroxysmal supraventricular tachycardia) 3/9/2018    History of urinary tract infection     recurrent in past     Hyperlipidemia     Hypertension     Miscarriage     Urinary tract infection     Uterine leiomyoma     had hysterectomy    Wears contact lenses     Wears glasses      Past Surgical History:   Procedure Laterality Date    HERNIA REPAIR      pt was 10months  old    HYSTERECTOMY      LASER ABLATION N/A     vaginal lesion(s)    MOLE EXCISION      OOPHORECTOMY      MN CYSTOURETHROSCOPY N/A 1/12/2016    Procedure: CYSTOSCOPY;  Surgeon: Darcy Álvarez DO;  [facility];  Service: Gynecology    MN ENDOSCOPIC PLANTAR FASCIOTOMY Right 7/21/2017    Procedure: RELEASE FASCIA PLANTAR/FASCIOTOMY ENDOSCOPIC (EPF);  Surgeon: Liban Ivan DPM;  [facility];  Service: Podiatry    MN FASCIECTOMY PLANTAR FASCIA PARTIAL SPX Left 4/8/2019    Procedure: LEFT  INSTEP PLANTAR FASCIOTOMY;  Surgeon: Morgan Mcintyre DPM;  Location: [facility];  Service: Podiatry    MN LAPS TOTAL HYSTERECT 250 GM/< W/RMVL TUBE/OVARY N/A 1/12/2016    Procedure: TOTAL LAPAROSCOPIC HYSTERECTOMY ;  Surgeon: Darcy Álvarez DO;  [facility];  Service: Gynecology    MN NDSC WRST SURG W/RLS TRANSVRS CARPL LIGM Right 1/29/2016    Procedure: ENDOSCOPIC CARPAL TUNNEL RELEASE;  Surgeon: Javier Montague MD;  [facility];  Service: Orthopedics    SALPINGECTOMY Bilateral 1/12/2016    Procedure: REMOVAL OF BOTH FALLOPIAN TUBES;  Surgeon: Darcy Álvarez DO;  [facility];  Service:     TUBAL LIGATION       Social History     Substance and Sexual Activity   Alcohol Use Yes    Alcohol/week: 2.0 standard drinks of alcohol    Types: 2 Glasses of wine per week    Comment: occasional alcohol use     Social History     Substance and Sexual Activity   Drug Use Never     Social History     Tobacco Use   Smoking Status Never   Smokeless Tobacco Never     Family History   Problem Relation Age of Onset    Cancer Mother         rectal    Diabetes Mother     Uterine cancer Mother     Obesity Mother     COPD Father     Hypothyroidism Sister     Scleroderma Sister     No Known Problems Sister     No Known Problems Daughter     No Known Problems Daughter     Heart failure Maternal Grandmother     Heart failure Maternal Grandfather     Heart attack Paternal Grandmother     No Known Problems Paternal  Grandfather     Atrial fibrillation Brother     Scleroderma Brother     No Known Problems Son        Allergies:  Allergies   Allergen Reactions    Levaquin [Levofloxacin] Anaphylaxis and Swelling     L       Medications:     Current Outpatient Medications:     atorvastatin (LIPITOR) 20 mg tablet, TAKE ONE TABLET BY MOUTH EVERY DAY, Disp: 90 tablet, Rfl: 0    chlorthalidone 25 mg tablet, Take 1 tablet 4 days a week every Monday, Wednesday, Friday, and Sunday., Disp: 360 tablet, Rfl: 3    Cranberry-Vit C-Lactobacillus (CRANBERRY/PROBIOTIC/VIT C) 450-30 MG TABS, Take 1 tablet by mouth daily, Disp: , Rfl:     estradiol (ESTRACE) 0.5 MG tablet, Take 1 tablet (0.5 mg total) by mouth daily, Disp: 90 tablet, Rfl: 3    meloxicam (MOBIC) 15 mg tablet, TAKE ONE TABLET BY MOUTH EVERY DAY, Disp: 90 tablet, Rfl: 1    metoprolol succinate (TOPROL-XL) 50 mg 24 hr tablet, TAKE ONE TABLET BY MOUTH EVERY DAY, Disp: 90 tablet, Rfl: 0    olmesartan (BENICAR) 20 mg tablet, TAKE ONE TABLET BY MOUTH EVERY DAY, Disp: 90 tablet, Rfl: 3    omeprazole (PriLOSEC OTC) 20 MG tablet, Take 1 tablet (20 mg total) by mouth daily, Disp: 100 tablet, Rfl: 5    Progesterone 100 MG CAPS, Take 1 capsule by mouth daily at bedtime, Disp: 90 capsule, Rfl: 3    TURMERIC CURCUMIN PO, Take by mouth, Disp: , Rfl:       Wt Readings from Last 3 Encounters:   09/13/23 73 kg (161 lb)   05/15/23 70.3 kg (155 lb)   05/10/23 70.3 kg (155 lb)     Temp Readings from Last 3 Encounters:   11/06/22 99.9 °F (37.7 °C) (Temporal)   09/27/22 97.6 °F (36.4 °C)   06/18/22 98.7 °F (37.1 °C)     BP Readings from Last 3 Encounters:   09/13/23 142/82   05/10/23 140/84   01/23/23 138/84     Pulse Readings from Last 3 Encounters:   09/13/23 74   05/10/23 64   11/18/22 75         Physical Exam  HENT:      Head: Atraumatic.      Mouth/Throat:      Mouth: Mucous membranes are moist.   Eyes:      Extraocular Movements: Extraocular movements intact.   Cardiovascular:      Rate and Rhythm:  Normal rate and regular rhythm.      Heart sounds: Normal heart sounds.   Pulmonary:      Effort: Pulmonary effort is normal.      Breath sounds: Normal breath sounds.   Abdominal:      General: Abdomen is flat.   Musculoskeletal:         General: Normal range of motion.      Cervical back: Normal range of motion.   Skin:     General: Skin is warm.   Neurological:      General: No focal deficit present.      Mental Status: She is alert and oriented to person, place, and time.   Psychiatric:         Mood and Affect: Mood normal.         Behavior: Behavior normal.           Laboratory Studies:  CMP:  Lab Results   Component Value Date     03/19/2015    K 4.0 05/03/2023     05/03/2023    CO2 26 05/03/2023    ANIONGAP 8 03/19/2015    BUN 20 05/03/2023    CREATININE 0.90 05/03/2023    GLUCOSE 83 03/19/2015    AST 16 05/03/2023    ALT 21 05/03/2023    BILITOT 0.59 03/19/2015    EGFR 72 05/03/2023       Lipid Profile:   Lab Results   Component Value Date    CHOL 212 06/30/2015     Lab Results   Component Value Date    HDL 59 05/03/2023     Lab Results   Component Value Date    LDLCALC 68 05/03/2023     Lab Results   Component Value Date    TRIG 205 (H) 05/03/2023

## 2024-01-29 ENCOUNTER — HOSPITAL ENCOUNTER (OUTPATIENT)
Dept: GASTROENTEROLOGY | Facility: HOSPITAL | Age: 59
Setting detail: OUTPATIENT SURGERY
Discharge: HOME | End: 2024-01-29
Attending: INTERNAL MEDICINE | Admitting: INTERNAL MEDICINE
Payer: COMMERCIAL

## 2024-01-29 ENCOUNTER — ANESTHESIA EVENT (OUTPATIENT)
Dept: GASTROENTEROLOGY | Facility: HOSPITAL | Age: 59
End: 2024-01-29

## 2024-01-29 ENCOUNTER — ANESTHESIA (OUTPATIENT)
Dept: GASTROENTEROLOGY | Facility: HOSPITAL | Age: 59
End: 2024-01-29

## 2024-01-29 VITALS
HEART RATE: 69 BPM | RESPIRATION RATE: 20 BRPM | SYSTOLIC BLOOD PRESSURE: 127 MMHG | TEMPERATURE: 97.5 F | DIASTOLIC BLOOD PRESSURE: 64 MMHG | OXYGEN SATURATION: 99 %

## 2024-01-29 DIAGNOSIS — Z12.11 SCREENING FOR COLON CANCER: ICD-10-CM

## 2024-01-29 PROCEDURE — 45385 COLONOSCOPY W/LESION REMOVAL: CPT | Performed by: INTERNAL MEDICINE

## 2024-01-29 PROCEDURE — 88305 TISSUE EXAM BY PATHOLOGIST: CPT | Performed by: PATHOLOGY

## 2024-01-29 RX ORDER — PROPOFOL 10 MG/ML
INJECTION, EMULSION INTRAVENOUS AS NEEDED
Status: DISCONTINUED | OUTPATIENT
Start: 2024-01-29 | End: 2024-01-29

## 2024-01-29 RX ORDER — LIDOCAINE HYDROCHLORIDE 20 MG/ML
INJECTION, SOLUTION EPIDURAL; INFILTRATION; INTRACAUDAL; PERINEURAL AS NEEDED
Status: DISCONTINUED | OUTPATIENT
Start: 2024-01-29 | End: 2024-01-29

## 2024-01-29 RX ORDER — SODIUM CHLORIDE, SODIUM LACTATE, POTASSIUM CHLORIDE, CALCIUM CHLORIDE 600; 310; 30; 20 MG/100ML; MG/100ML; MG/100ML; MG/100ML
INJECTION, SOLUTION INTRAVENOUS CONTINUOUS PRN
Status: DISCONTINUED | OUTPATIENT
Start: 2024-01-29 | End: 2024-01-29

## 2024-01-29 RX ADMIN — PROPOFOL 100 MG: 10 INJECTION, EMULSION INTRAVENOUS at 10:08

## 2024-01-29 RX ADMIN — PROPOFOL 50 MG: 10 INJECTION, EMULSION INTRAVENOUS at 10:11

## 2024-01-29 RX ADMIN — LIDOCAINE HYDROCHLORIDE 100 MG: 20 INJECTION, SOLUTION EPIDURAL; INFILTRATION; INTRACAUDAL; PERINEURAL at 10:08

## 2024-01-29 RX ADMIN — SODIUM CHLORIDE, SODIUM LACTATE, POTASSIUM CHLORIDE, AND CALCIUM CHLORIDE: .6; .31; .03; .02 INJECTION, SOLUTION INTRAVENOUS at 10:08

## 2024-01-29 RX ADMIN — PROPOFOL 50 MG: 10 INJECTION, EMULSION INTRAVENOUS at 10:15

## 2024-01-29 NOTE — ANESTHESIA POSTPROCEDURE EVALUATION
Post-Op Assessment Note    CV Status:  Stable    Pain management: adequate       Mental Status:  Alert and awake   Hydration Status:  Euvolemic   PONV Controlled:  Controlled   Airway Patency:  Patent     Post Op Vitals Reviewed: Yes    No anethesia notable event occurred.    Staff: CRNA               /72 (01/29/24 1020)    Temp 97.5 °F (36.4 °C) (01/29/24 1020)    Pulse 80 (01/29/24 1020)   Resp 16 (01/29/24 1020)    SpO2 97 % (01/29/24 1020)

## 2024-01-29 NOTE — H&P
History and Physical - [facility]  Liliana Chavez 58 y.o. female MRN: [id number]                  HPI: Liliana Chavez is a 58 y.o. year old female who presents for colonoscopy for a history of colon polyps.  Last colonoscopy 5 years ago      REVIEW OF SYSTEMS: Per the HPI, and otherwise unremarkable.    Historical Information   Past Medical History:   Diagnosis Date    Abnormal Pap smear of cervix     in the past     Arthralgia 2020    Arthritis     Carpal tunnel syndrome     Right    Functional heart murmur in      GERD (gastroesophageal reflux disease)     no meds    Heart murmur     History of paroxysmal supraventricular tachycardia     History of PSVT (paroxysmal supraventricular tachycardia) 3/9/2018    History of urinary tract infection     recurrent in past     Hyperlipidemia     Hypertension     Miscarriage     Urinary tract infection     Uterine leiomyoma     had hysterectomy    Wears contact lenses     Wears glasses      Past Surgical History:   Procedure Laterality Date    HERNIA REPAIR      pt was 10months old    HYSTERECTOMY      LASER ABLATION N/A     vaginal lesion(s)    MOLE EXCISION      OOPHORECTOMY      DE CYSTOURETHROSCOPY N/A 2016    Procedure: CYSTOSCOPY;  Surgeon: Darcy Álvarez DO;  [facility];  Service: Gynecology    DE ENDOSCOPIC PLANTAR FASCIOTOMY Right 2017    Procedure: RELEASE FASCIA PLANTAR/FASCIOTOMY ENDOSCOPIC (EPF);  Surgeon: Liban Ivan DPM;  [facility];  Service: Podiatry    DE FASCIECTOMY PLANTAR FASCIA PARTIAL SPX Left 2019    Procedure: LEFT  INSTEP PLANTAR FASCIOTOMY;  Surgeon: Morgan Mcintyre DPM;  Location:  MAIN OR;  Service: Podiatry    DE LAPS TOTAL HYSTERECT 250 GM/< W/RMVL TUBE/OVARY N/A 2016    Procedure: TOTAL LAPAROSCOPIC HYSTERECTOMY ;  Surgeon: Darcy Álvaerz DO;  [facility];  Service: Gynecology    DE NDSC WRST SURG W/RLS TRANSVRS CARPL LIGM Right 2016    Procedure: ENDOSCOPIC  CARPAL TUNNEL RELEASE;  Surgeon: Javier Montague MD;  [facility];  Service: Orthopedics    SALPINGECTOMY Bilateral 1/12/2016    Procedure: REMOVAL OF BOTH FALLOPIAN TUBES;  Surgeon: Darcy Álvarez DO;  [facility];  Service:     TUBAL LIGATION       Social History   Social History     Substance and Sexual Activity   Alcohol Use Yes    Alcohol/week: 2.0 standard drinks of alcohol    Types: 2 Glasses of wine per week    Comment: occasional alcohol use     Social History     Substance and Sexual Activity   Drug Use Never     Social History     Tobacco Use   Smoking Status Never   Smokeless Tobacco Never     Family History   Problem Relation Age of Onset    Cancer Mother         rectal    Diabetes Mother     Uterine cancer Mother     Obesity Mother     COPD Father     Hypothyroidism Sister     Scleroderma Sister     No Known Problems Sister     No Known Problems Daughter     No Known Problems Daughter     Heart failure Maternal Grandmother     Heart failure Maternal Grandfather     Heart attack Paternal Grandmother     No Known Problems Paternal Grandfather     Atrial fibrillation Brother     Scleroderma Brother     No Known Problems Son        Meds/Allergies     (Not in a hospital admission)      Allergies   Allergen Reactions    Levaquin [Levofloxacin] Anaphylaxis and Swelling     L       Objective     not currently breastfeeding.      PHYSICAL EXAM    Gen: NAD  CV: RRR  CHEST: Clear  ABD: soft, NT/ND  EXT: no edema  Neuro: AAO      ASSESSMENT/PLAN:  This is a 58 y.o. year old female here for history of colon polyps    PLAN:   Procedure: Colonoscopy

## 2024-01-29 NOTE — ANESTHESIA PREPROCEDURE EVALUATION
"Procedure:  COLONOSCOPY    Relevant Problems   CARDIO   (+) Atrial bigeminy   (+) Chronic migraine without aura without status migrainosus, not intractable   (+) Essential hypertension   (+) PSVT (paroxysmal supraventricular tachycardia)   (+) Premature atrial contractions      GI/HEPATIC   (+) Gastroesophageal reflux disease without esophagitis      NEURO/PSYCH   (+) Chronic migraine without aura without status migrainosus, not intractable      Other   (+) Mixed dyslipidemia   (+) Overweight (BMI 25.0-29.9)        Physical Exam    Airway    Mallampati score: II  TM Distance: >3 FB  Neck ROM: full     Dental       Cardiovascular      Pulmonary      Other Findings  post-pubertal.      Anesthesia Plan  ASA Score- 3     Anesthesia Type- IV sedation with anesthesia with ASA Monitors.         Additional Monitors:     Airway Plan:     Comment: Recent labs personally reviewed:  Lab Results       Component                Value               Date                       WBC                      6.57                10/11/2021                 HGB                      12.7                10/11/2021                 PLT                      272                 10/11/2021            Lab Results       Component                Value               Date                       NA                       137                 03/19/2015                 K                        3.9                 01/16/2024                 BUN                      16                  01/16/2024                 CREATININE               0.95                01/16/2024                 GLUCOSE                  83                  03/19/2015            No results found for: \"PTT\"   Lab Results       Component                Value               Date                       INR                      0.93                03/22/2019              Blood type A    I, Lela Hart MD, have personally seen and evaluated the patient prior to anesthetic care.  I have reviewed the " pre-anesthetic record, medical history, allergies, medications and any other medical records if appropriate to the anesthetic care.  If a CRNA is involved in the case, I have reviewed the CRNA assessment, if present, and agree. Patient consented for IV Sedation, general anesthesia as back up. Discussed risks of aspiration, IV infiltration, indications for conversion to general anesthesia. All questions and concerns addressed.   .       Plan Factors-Exercise tolerance (METS): >4 METS.    Chart reviewed.   Existing labs reviewed. Patient summary reviewed.    Patient is not a current smoker.  Patient did not smoke on day of surgery.    Obstructive sleep apnea risk education given perioperatively.        Induction- intravenous.    Postoperative Plan-     Informed Consent- Anesthetic plan and risks discussed with patient.  I personally reviewed this patient with the CRNA. Discussed and agreed on the Anesthesia Plan with the CRNA..

## 2024-01-29 NOTE — INTERVAL H&P NOTE
H&P reviewed. After examining the patient I find no changes in the patients condition since the H&P had been written.    Vitals:    01/29/24 0908   BP: 126/85   Pulse: 75   Resp: 16   Temp: 97.6 °F (36.4 °C)   SpO2: 99%

## 2024-01-31 PROCEDURE — 88305 TISSUE EXAM BY PATHOLOGIST: CPT | Performed by: PATHOLOGY

## 2024-02-12 LAB
APOB+LDLR+PCSK9 GENE MUT ANL BLD/T: NOT DETECTED
BRCA1+BRCA2 DEL+DUP + FULL MUT ANL BLD/T: NOT DETECTED
MLH1+MSH2+MSH6+PMS2 GN DEL+DUP+FUL M: NOT DETECTED

## 2024-03-20 DIAGNOSIS — E78.2 MIXED HYPERLIPIDEMIA: ICD-10-CM

## 2024-03-20 DIAGNOSIS — I10 UNCONTROLLED HYPERTENSION: ICD-10-CM

## 2024-03-20 DIAGNOSIS — I47.10 PSVT (PAROXYSMAL SUPRAVENTRICULAR TACHYCARDIA): ICD-10-CM

## 2024-03-20 RX ORDER — OLMESARTAN MEDOXOMIL 20 MG/1
20 TABLET ORAL DAILY
Qty: 90 TABLET | Refills: 3 | Status: SHIPPED | OUTPATIENT
Start: 2024-03-20

## 2024-03-20 RX ORDER — ATORVASTATIN CALCIUM 20 MG/1
20 TABLET, FILM COATED ORAL DAILY
Qty: 90 TABLET | Refills: 3 | Status: SHIPPED | OUTPATIENT
Start: 2024-03-20

## 2024-03-20 RX ORDER — METOPROLOL SUCCINATE 50 MG/1
50 TABLET, EXTENDED RELEASE ORAL DAILY
Qty: 90 TABLET | Refills: 3 | Status: SHIPPED | OUTPATIENT
Start: 2024-03-20

## 2024-03-30 ENCOUNTER — APPOINTMENT (OUTPATIENT)
Dept: LAB | Facility: MEDICAL CENTER | Age: 59
End: 2024-03-30
Payer: COMMERCIAL

## 2024-03-30 ENCOUNTER — APPOINTMENT (OUTPATIENT)
Dept: RADIOLOGY | Facility: MEDICAL CENTER | Age: 59
End: 2024-03-30
Payer: COMMERCIAL

## 2024-03-30 DIAGNOSIS — M79.671 RIGHT FOOT PAIN: ICD-10-CM

## 2024-03-30 DIAGNOSIS — Z00.8 HEALTH EXAMINATION IN POPULATION SURVEYS: ICD-10-CM

## 2024-03-30 LAB
CHOLEST SERPL-MCNC: 187 MG/DL
EST. AVERAGE GLUCOSE BLD GHB EST-MCNC: 117 MG/DL
HBA1C MFR BLD: 5.7 %
HDLC SERPL-MCNC: 60 MG/DL
LDLC SERPL CALC-MCNC: 77 MG/DL (ref 0–100)
NONHDLC SERPL-MCNC: 127 MG/DL
TRIGL SERPL-MCNC: 251 MG/DL

## 2024-03-30 PROCEDURE — 73630 X-RAY EXAM OF FOOT: CPT

## 2024-03-30 PROCEDURE — 80061 LIPID PANEL: CPT

## 2024-03-30 PROCEDURE — 36415 COLL VENOUS BLD VENIPUNCTURE: CPT

## 2024-03-30 PROCEDURE — 83036 HEMOGLOBIN GLYCOSYLATED A1C: CPT

## 2024-04-16 DIAGNOSIS — N95.1 SYMPTOMATIC MENOPAUSAL OR FEMALE CLIMACTERIC STATES: ICD-10-CM

## 2024-04-16 RX ORDER — ESTRADIOL 0.5 MG/1
0.5 TABLET ORAL DAILY
Qty: 90 TABLET | Refills: 0 | Status: SHIPPED | OUTPATIENT
Start: 2024-04-16

## 2024-04-16 RX ORDER — PROGESTERONE 100 MG/1
1 CAPSULE ORAL
Qty: 90 CAPSULE | Refills: 0 | Status: SHIPPED | OUTPATIENT
Start: 2024-04-16

## 2024-06-11 ENCOUNTER — OFFICE VISIT (OUTPATIENT)
Dept: URGENT CARE | Facility: MEDICAL CENTER | Age: 59
End: 2024-06-11
Payer: COMMERCIAL

## 2024-06-11 VITALS
TEMPERATURE: 98.1 F | SYSTOLIC BLOOD PRESSURE: 146 MMHG | HEIGHT: 62 IN | WEIGHT: 161 LBS | DIASTOLIC BLOOD PRESSURE: 85 MMHG | RESPIRATION RATE: 18 BRPM | BODY MASS INDEX: 29.63 KG/M2 | OXYGEN SATURATION: 95 % | HEART RATE: 73 BPM

## 2024-06-11 DIAGNOSIS — L03.90 CELLULITIS, UNSPECIFIED CELLULITIS SITE: Primary | ICD-10-CM

## 2024-06-11 PROCEDURE — 99213 OFFICE O/P EST LOW 20 MIN: CPT | Performed by: PHYSICIAN ASSISTANT

## 2024-06-11 RX ORDER — METHYLPREDNISOLONE 4 MG/1
TABLET ORAL
Qty: 21 TABLET | Refills: 0 | Status: SHIPPED | OUTPATIENT
Start: 2024-06-11

## 2024-06-11 RX ORDER — CEPHALEXIN 500 MG/1
500 CAPSULE ORAL EVERY 6 HOURS SCHEDULED
Qty: 28 CAPSULE | Refills: 0 | Status: SHIPPED | OUTPATIENT
Start: 2024-06-11 | End: 2024-06-18

## 2024-06-11 NOTE — PROGRESS NOTES
[facility]        NAME: Liliaan Chavez is a 59 y.o. female  : 1965    MRN: [id number]  DATE: 2024  TIME: 5:25 PM    Assessment and Plan   Cellulitis, unspecified cellulitis site [L03.90]  1. Cellulitis, unspecified cellulitis site  cephalexin (KEFLEX) 500 mg capsule    methylPREDNISolone 4 MG tablet therapy pack            Patient Instructions     Cellulitis left forearm  Medrol dose pack as directed  Keflex as directed  Follow up with PCP in 3-5 days.  Proceed to  ER if symptoms worsen.    Chief Complaint     Chief Complaint   Patient presents with    Insect Bite     Yesterday noticed insect bite on left forearm, today red, swollen, itches, hot, oozing.         History of Present Illness       58 y/o female who presents c/o pain and swelling to left forearm. States she saw an insect bite on the forearm that has now become red and painful. Denies fever, chills, nausea, vomiting.    Insect Bite  Associated symptoms include a rash. Pertinent negatives include no chest pain or coughing.       Review of Systems   Review of Systems   Constitutional: Negative.    HENT: Negative.     Eyes: Negative.    Respiratory: Negative.  Negative for cough, chest tightness, shortness of breath, wheezing and stridor.    Cardiovascular: Negative.  Negative for chest pain, palpitations and leg swelling.   Skin:  Positive for rash.         Current Medications       Current Outpatient Medications:     atorvastatin (LIPITOR) 20 mg tablet, Take 1 tablet (20 mg total) by mouth daily, Disp: 90 tablet, Rfl: 3    cephalexin (KEFLEX) 500 mg capsule, Take 1 capsule (500 mg total) by mouth every 6 (six) hours for 7 days, Disp: 28 capsule, Rfl: 0    chlorthalidone 25 mg tablet, Take 1 tablet 4 days a week every Monday, Wednesday, Friday, and ., Disp: 360 tablet, Rfl: 3    Cranberry-Vit C-Lactobacillus (CRANBERRY/PROBIOTIC/VIT C) 450-30 MG TABS, Take 1 tablet by mouth daily, Disp: , Rfl:     estradiol (ESTRACE) 0.5 MG  tablet, TAKE ONE TABLET BY MOUTH EVERY DAY, Disp: 90 tablet, Rfl: 0    meloxicam (MOBIC) 15 mg tablet, TAKE ONE TABLET BY MOUTH EVERY DAY, Disp: 90 tablet, Rfl: 1    methylPREDNISolone 4 MG tablet therapy pack, Use as directed on package, Disp: 21 tablet, Rfl: 0    metoprolol succinate (TOPROL-XL) 50 mg 24 hr tablet, Take 1 tablet (50 mg total) by mouth daily, Disp: 90 tablet, Rfl: 3    olmesartan (BENICAR) 20 mg tablet, Take 1 tablet (20 mg total) by mouth daily, Disp: 90 tablet, Rfl: 3    omeprazole (PriLOSEC OTC) 20 MG tablet, Take 1 tablet (20 mg total) by mouth daily, Disp: 100 tablet, Rfl: 5    Progesterone 100 MG CAPS, TAKE ONE CAPSULE BY MOUTH DAILY AT BEDTIME, Disp: 90 capsule, Rfl: 0    TURMERIC CURCUMIN PO, Take by mouth, Disp: , Rfl:     Current Allergies     Allergies as of 2024 - Reviewed 2024   Allergen Reaction Noted    Levaquin [levofloxacin] Anaphylaxis and Swelling 2015            The following portions of the patient's history were reviewed and updated as appropriate: allergies, current medications, past family history, past medical history, past social history, past surgical history and problem list.     Past Medical History:   Diagnosis Date    Abnormal Pap smear of cervix     in the past     Arthralgia 2020    Arthritis     Carpal tunnel syndrome     Right    Functional heart murmur in      GERD (gastroesophageal reflux disease)     no meds    Heart murmur     History of paroxysmal supraventricular tachycardia     History of PSVT (paroxysmal supraventricular tachycardia) 3/9/2018    History of urinary tract infection     recurrent in past     Hyperlipidemia     Hypertension     Miscarriage     Urinary tract infection     Uterine leiomyoma     had hysterectomy    Wears contact lenses     Wears glasses        Past Surgical History:   Procedure Laterality Date    BUNIONECTOMY Right 2016    HERNIA REPAIR      pt was 10months old    HYSTERECTOMY      LASER ABLATION N/A   "   vaginal lesion(s)    MOLE EXCISION      OOPHORECTOMY      SC CYSTOURETHROSCOPY N/A 01/12/2016    Procedure: CYSTOSCOPY;  Surgeon: Darcy Álvarez DO;  [facility];  Service: Gynecology    SC ENDOSCOPIC PLANTAR FASCIOTOMY Right 07/21/2017    Procedure: RELEASE FASCIA PLANTAR/FASCIOTOMY ENDOSCOPIC (EPF);  Surgeon: Liban Ivan DPM;  [facility];  Service: Podiatry    SC FASCIECTOMY PLANTAR FASCIA PARTIAL SPX Left 04/08/2019    Procedure: LEFT  INSTEP PLANTAR FASCIOTOMY;  Surgeon: Morgan Mcintyre DPM;  [facility];  Service: Podiatry    SC LAPS TOTAL HYSTERECT 250 GM/< W/RMVL TUBE/OVARY N/A 01/12/2016    Procedure: TOTAL LAPAROSCOPIC HYSTERECTOMY ;  Surgeon: Darcy Álvarez DO;  [facility];  Service: Gynecology    SC NDSC WRST SURG W/RLS TRANSVRS CARPL LIGM Right 01/29/2016    Procedure: ENDOSCOPIC CARPAL TUNNEL RELEASE;  Surgeon: Javier Montague MD;  [facility];  Service: Orthopedics    SALPINGECTOMY Bilateral 01/12/2016    Procedure: REMOVAL OF BOTH FALLOPIAN TUBES;  Surgeon: Darcy Álvarez DO;  [facility];  Service:     TUBAL LIGATION         Family History   Problem Relation Age of Onset    Cancer Mother         rectal    Diabetes Mother     Uterine cancer Mother     Obesity Mother     COPD Father     Hypothyroidism Sister     Scleroderma Sister     No Known Problems Sister     No Known Problems Daughter     No Known Problems Daughter     Heart failure Maternal Grandmother     Heart failure Maternal Grandfather     Heart attack Paternal Grandmother     No Known Problems Paternal Grandfather     Atrial fibrillation Brother     Scleroderma Brother     No Known Problems Son          Medications have been verified.        Objective   /85   Pulse 73   Temp 98.1 °F (36.7 °C) (Temporal)   Resp 18   Ht 5' 2\" (1.575 m)   Wt 73 kg (161 lb)   SpO2 95%   BMI 29.45 kg/m²        Physical Exam     Physical Exam  Constitutional:       Appearance: Normal " appearance. She is well-developed.   HENT:      Head: Normocephalic and atraumatic.      Right Ear: External ear normal.      Left Ear: External ear normal.      Mouth/Throat:      Mouth: Oropharynx is clear and moist.   Cardiovascular:      Rate and Rhythm: Normal rate and regular rhythm.      Heart sounds: Normal heart sounds.   Pulmonary:      Effort: Pulmonary effort is normal. No respiratory distress.      Breath sounds: Normal breath sounds. No wheezing or rales.   Chest:      Chest wall: No tenderness.   Musculoskeletal:      Cervical back: Normal range of motion and neck supple.   Lymphadenopathy:      Cervical: No cervical adenopathy.   Skin:         Neurological:      Mental Status: She is alert.

## 2024-06-11 NOTE — PATIENT INSTRUCTIONS
Cellulitis left forearm  Medrol dose pack as directed  Keflex as directed  Follow up with PCP in 3-5 days.  Proceed to  ER if symptoms worsen.

## 2024-06-24 ENCOUNTER — ANNUAL EXAM (OUTPATIENT)
Dept: OBGYN CLINIC | Facility: CLINIC | Age: 59
End: 2024-06-24
Payer: COMMERCIAL

## 2024-06-24 VITALS
WEIGHT: 162 LBS | HEIGHT: 62 IN | DIASTOLIC BLOOD PRESSURE: 82 MMHG | BODY MASS INDEX: 29.81 KG/M2 | SYSTOLIC BLOOD PRESSURE: 126 MMHG

## 2024-06-24 DIAGNOSIS — Z01.419 WOMEN'S ANNUAL ROUTINE GYNECOLOGICAL EXAMINATION: Primary | ICD-10-CM

## 2024-06-24 DIAGNOSIS — Z12.31 ENCOUNTER FOR SCREENING MAMMOGRAM FOR MALIGNANT NEOPLASM OF BREAST: ICD-10-CM

## 2024-06-24 PROCEDURE — S0612 ANNUAL GYNECOLOGICAL EXAMINA: HCPCS | Performed by: OBSTETRICS & GYNECOLOGY

## 2024-06-24 NOTE — PROGRESS NOTES
ASSESSMENT & PLAN:   Diagnoses and all orders for this visit:    Women's annual routine gynecological examination    Encounter for screening mammogram for malignant neoplasm of breast  -     Mammo screening bilateral w 3d & cad          The following were reviewed in today's visit: ASCCP guidelines, Gardisil vaccination, STD testing breast self exam, mammography screening ordered, menopause, and exercise.    Patient to return to office in yearly for annual exam.     All questions have been answered to her satisfaction.        CC:  Annual Gynecologic Examination  Chief Complaint   Patient presents with   • Gynecologic Exam     Liliana Chavez is here for her annual appt; mammo ordered.  (hx of hysterectomy (TLH, no paps); last pap  NILM, neg HPV  3d mammogram 05/15/2023 neg mammo, repeat 1 yr   colonoscopy 2024-repeat 3 yrs   DXA 10/17/2019 )         HPI: Liliana Chavez is a 59 y.o.  who presents for annual gynecologic examination.  She has the following concerns:  not sleeping well. Trouble falling asleep, trouble staying asleep, doesn't wake up feeling refreshed. This all started a few months ago. No major life changes or stressors that she can think of. No major health changes. Harder to lose weight. She is exercising every day for 30 minutes. She is doing standing cardio/strength training.     Health Maintenance:    Exercise: frequently  Breast exams/breast awareness: no  Last mammogram:   Colorectal cancer screenin    Past Medical History:   Diagnosis Date   • Abnormal Pap smear of cervix     in the past    • Arthralgia 2020   • Arthritis    • Carpal tunnel syndrome     Right   • Functional heart murmur in     • GERD (gastroesophageal reflux disease)     no meds   • Heart murmur    • History of paroxysmal supraventricular tachycardia    • History of PSVT (paroxysmal supraventricular tachycardia) 3/9/2018   • History of urinary tract infection     recurrent in past    •  Hyperlipidemia    • Hypertension    • Miscarriage    • Urinary tract infection    • Uterine leiomyoma     had hysterectomy   • Wears contact lenses    • Wears glasses        Past Surgical History:   Procedure Laterality Date   • BUNIONECTOMY Right 2016   • HERNIA REPAIR      pt was 10months old   • HYSTERECTOMY     • LASER ABLATION N/A     vaginal lesion(s)   • MOLE EXCISION     • OOPHORECTOMY     • MS CYSTOURETHROSCOPY N/A 01/12/2016    Procedure: CYSTOSCOPY;  Surgeon: Darcy Álvarez DO;  [facility];  Service: Gynecology   • MS ENDOSCOPIC PLANTAR FASCIOTOMY Right 07/21/2017    Procedure: RELEASE FASCIA PLANTAR/FASCIOTOMY ENDOSCOPIC (EPF);  Surgeon: Liban Ivan DPM;  [facility];  Service: Podiatry   • MS FASCIECTOMY PLANTAR FASCIA PARTIAL SPX Left 04/08/2019    Procedure: LEFT  INSTEP PLANTAR FASCIOTOMY;  Surgeon: Morgan Mcintyre DPM;  [facility];  Service: Podiatry   • MS LAPS TOTAL HYSTERECT 250 GM/< W/RMVL TUBE/OVARY N/A 01/12/2016    Procedure: TOTAL LAPAROSCOPIC HYSTERECTOMY ;  Surgeon: Darcy Álvarez DO;  [facility];  Service: Gynecology   • MS NDSC WRST SURG W/RLS TRANSVRS CARPL LIGM Right 01/29/2016    Procedure: ENDOSCOPIC CARPAL TUNNEL RELEASE;  Surgeon: Javier Montague MD;  [facility];  Service: Orthopedics   • SALPINGECTOMY Bilateral 01/12/2016    Procedure: REMOVAL OF BOTH FALLOPIAN TUBES;  Surgeon: Darcy Álvarez DO;  [facility];  Service:    • TUBAL LIGATION         Past OB/Gyn History:   No LMP recorded. Patient has had a hysterectomy.    Menopausal status: postmenopausal  Menopausal symptoms: See HPI    Last Pap: 2015 : no abnormalities  History of abnormal Pap smear: yes - h/o 1 abnormal, many years ago. Normal path at time of hysterectomy    Patient is currently sexually active.   STD testing: no  Current contraception: status post hysterectomy      Family History  Family History   Problem Relation Age of Onset   • Cancer Mother          rectal   • Diabetes Mother    • Uterine cancer Mother    • Obesity Mother    • Heart disease Mother    • COPD Father    • Hypothyroidism Sister    • Scleroderma Sister    • No Known Problems Sister    • No Known Problems Daughter    • No Known Problems Daughter    • Heart failure Maternal Grandmother    • Heart failure Maternal Grandfather    • Heart attack Paternal Grandmother    • No Known Problems Paternal Grandfather    • Atrial fibrillation Brother    • Scleroderma Brother    • No Known Problems Son        Family history of uterine or ovarian cancer: yes   Family history of breast cancer: no  Family history of colon cancer: rectal   Mom had uterine and rectal cancer.     Social History:  Social History     Socioeconomic History   • Marital status:      Spouse name: Not on file   • Number of children: Not on file   • Years of education: Not on file   • Highest education level: Not on file   Occupational History   • Not on file   Tobacco Use   • Smoking status: Never   • Smokeless tobacco: Never   Vaping Use   • Vaping status: Never Used   Substance and Sexual Activity   • Alcohol use: Yes     Alcohol/week: 2.0 standard drinks of alcohol     Types: 2 Glasses of wine per week     Comment: occasional alcohol use   • Drug use: Never   • Sexual activity: Yes     Partners: Male     Birth control/protection: Post-menopausal, Female Sterilization, None   Other Topics Concern   • Not on file   Social History Narrative    Per allscripts;     Caffeine use    Coffee    Exercise: Walking         Social Determinants of Health     Financial Resource Strain: Not on file   Food Insecurity: Not on file   Transportation Needs: Not on file   Physical Activity: Not on file   Stress: Not on file   Social Connections: Not on file   Intimate Partner Violence: Not on file   Housing Stability: Not on file     Domestic violence screen: negative    Allergies:  Allergies   Allergen Reactions   • Levaquin [Levofloxacin]  "Anaphylaxis and Swelling     L       Medications:    Current Outpatient Medications:   •  atorvastatin (LIPITOR) 20 mg tablet, Take 1 tablet (20 mg total) by mouth daily, Disp: 90 tablet, Rfl: 3  •  chlorthalidone 25 mg tablet, Take 1 tablet 4 days a week every Monday, Wednesday, Friday, and Sunday., Disp: 360 tablet, Rfl: 3  •  Cranberry-Vit C-Lactobacillus (CRANBERRY/PROBIOTIC/VIT C) 450-30 MG TABS, Take 1 tablet by mouth daily, Disp: , Rfl:   •  estradiol (ESTRACE) 0.5 MG tablet, TAKE ONE TABLET BY MOUTH EVERY DAY, Disp: 90 tablet, Rfl: 0  •  meloxicam (MOBIC) 15 mg tablet, TAKE ONE TABLET BY MOUTH EVERY DAY, Disp: 90 tablet, Rfl: 1  •  metoprolol succinate (TOPROL-XL) 50 mg 24 hr tablet, Take 1 tablet (50 mg total) by mouth daily, Disp: 90 tablet, Rfl: 3  •  olmesartan (BENICAR) 20 mg tablet, Take 1 tablet (20 mg total) by mouth daily, Disp: 90 tablet, Rfl: 3  •  omeprazole (PriLOSEC OTC) 20 MG tablet, Take 1 tablet (20 mg total) by mouth daily, Disp: 100 tablet, Rfl: 5  •  Progesterone 100 MG CAPS, TAKE ONE CAPSULE BY MOUTH DAILY AT BEDTIME, Disp: 90 capsule, Rfl: 0  •  TURMERIC CURCUMIN PO, Take by mouth, Disp: , Rfl:     Review of Systems:  Review of Systems   Constitutional:  Negative for chills and fever.   Respiratory:  Positive for shortness of breath (allergy related?).    Cardiovascular:  Negative for chest pain.   Gastrointestinal:  Positive for abdominal distention (sometimes). Negative for abdominal pain, blood in stool, constipation, nausea and vomiting.   Genitourinary:  Negative for difficulty urinating, dyspareunia, dysuria, frequency, pelvic pain, urgency, vaginal bleeding, vaginal discharge and vaginal pain.         Physical Exam:  /82 (BP Location: Right arm, Patient Position: Sitting, Cuff Size: Standard)   Ht 5' 2\" (1.575 m)   Wt 73.5 kg (162 lb)   BMI 29.63 kg/m²    Physical Exam  Constitutional:       General: She is awake.      Appearance: Normal appearance. She is " well-developed.   Genitourinary:      Vulva and bladder normal.      Right Labia: No rash, tenderness or lesions.     Left Labia: No tenderness, lesions or rash.     Vaginal cuff intact.     No vaginal discharge, erythema, tenderness or bleeding.      No vaginal prolapse present.     No vaginal atrophy present.       Right Adnexa: not tender, not full and no mass present.     Left Adnexa: not tender, not full and no mass present.     Cervix is absent.      Uterus is absent.      No urethral prolapse present.      Bladder is not tender.       Pelvic exam was performed with patient in the lithotomy position.   Breasts:     Right: Normal. No inverted nipple, mass, nipple discharge, skin change or tenderness.      Left: Normal. No inverted nipple, mass, nipple discharge, skin change or tenderness.   HENT:      Head: Normocephalic and atraumatic.   Cardiovascular:      Rate and Rhythm: Normal rate and regular rhythm.      Heart sounds: Normal heart sounds.   Pulmonary:      Effort: Pulmonary effort is normal. No tachypnea or respiratory distress.      Breath sounds: Normal breath sounds.   Abdominal:      General: There is no distension.      Palpations: Abdomen is soft.      Tenderness: There is no abdominal tenderness. There is no guarding.   Musculoskeletal:      Cervical back: Neck supple.   Lymphadenopathy:      Upper Body:      Right upper body: No supraclavicular or axillary adenopathy.      Left upper body: No supraclavicular or axillary adenopathy.   Neurological:      General: No focal deficit present.      Mental Status: She is alert and oriented to person, place, and time.   Psychiatric:         Mood and Affect: Mood normal.         Behavior: Behavior normal.         Thought Content: Thought content normal.         Judgment: Judgment normal.   Vitals reviewed.

## 2024-07-03 DIAGNOSIS — M25.50 ARTHRALGIA, UNSPECIFIED JOINT: ICD-10-CM

## 2024-07-03 RX ORDER — MELOXICAM 15 MG/1
TABLET ORAL
Qty: 90 TABLET | Refills: 0 | Status: SHIPPED | OUTPATIENT
Start: 2024-07-03

## 2024-07-03 NOTE — TELEPHONE ENCOUNTER
Patient needs updated blood work. Please place orders cbc.     A 90D mail order courtesy refill was provided.

## 2024-07-17 DIAGNOSIS — N95.1 SYMPTOMATIC MENOPAUSAL OR FEMALE CLIMACTERIC STATES: ICD-10-CM

## 2024-07-17 RX ORDER — PROGESTERONE 100 MG/1
1 CAPSULE ORAL
Qty: 90 CAPSULE | Refills: 3 | Status: SHIPPED | OUTPATIENT
Start: 2024-07-17

## 2024-07-17 RX ORDER — ESTRADIOL 0.5 MG/1
0.5 TABLET ORAL DAILY
Qty: 90 TABLET | Refills: 3 | Status: SHIPPED | OUTPATIENT
Start: 2024-07-17

## 2024-09-17 ENCOUNTER — OFFICE VISIT (OUTPATIENT)
Dept: CARDIOLOGY CLINIC | Facility: CLINIC | Age: 59
End: 2024-09-17
Payer: COMMERCIAL

## 2024-09-17 VITALS
HEART RATE: 80 BPM | BODY MASS INDEX: 28.89 KG/M2 | DIASTOLIC BLOOD PRESSURE: 80 MMHG | OXYGEN SATURATION: 97 % | WEIGHT: 157 LBS | HEIGHT: 62 IN | SYSTOLIC BLOOD PRESSURE: 138 MMHG

## 2024-09-17 DIAGNOSIS — I10 ESSENTIAL HYPERTENSION: ICD-10-CM

## 2024-09-17 DIAGNOSIS — I47.10 PSVT (PAROXYSMAL SUPRAVENTRICULAR TACHYCARDIA) (HCC): Primary | ICD-10-CM

## 2024-09-17 DIAGNOSIS — E78.2 MIXED DYSLIPIDEMIA: ICD-10-CM

## 2024-09-17 PROCEDURE — 93000 ELECTROCARDIOGRAM COMPLETE: CPT | Performed by: INTERNAL MEDICINE

## 2024-09-17 PROCEDURE — 99214 OFFICE O/P EST MOD 30 MIN: CPT | Performed by: INTERNAL MEDICINE

## 2024-09-17 RX ORDER — METOPROLOL SUCCINATE 50 MG/1
50 TABLET, EXTENDED RELEASE ORAL 2 TIMES DAILY
Qty: 180 TABLET | Refills: 3 | Status: SHIPPED | OUTPATIENT
Start: 2024-09-17

## 2024-09-17 NOTE — PATIENT INSTRUCTIONS
Recommendations:  Increase metoprolol succinate to 50mg 2x/day.  Continue remainder of medications.   Follow up in 6 months.

## 2024-09-17 NOTE — PROGRESS NOTES
Cardiology   Liliana Chavez 59 y.o. female MRN: [id number]        Impression:  PSVT - unchanged over past several years.  On b-blockers. Does have palpitations.    Hypertension - controlled.   Dyslipidemia - on statin.      Recommendations:  Increase metoprolol succinate to 50mg 2x/day.  Continue remainder of medications.   Follow up in 6 months.         HPI: Liliana Chavez is a 59 y.o. year old female with history of PSVT, hypertension, and dyslipidemia, who presents for follow up. Has episodes of palpitations several times a month lasting seconds. Unchanged. No chest pain or shortness of breath. Occasionally with whooshing sensation.          Review of Systems   Constitutional: Negative.    HENT: Negative.     Eyes: Negative.    Respiratory:  Negative for chest tightness and shortness of breath.    Cardiovascular:  Positive for palpitations. Negative for chest pain and leg swelling.   Gastrointestinal: Negative.    Endocrine: Negative.    Genitourinary: Negative.    Musculoskeletal: Negative.    Skin: Negative.    Allergic/Immunologic: Negative.    Neurological: Negative.    Hematological: Negative.    Psychiatric/Behavioral: Negative.     All other systems reviewed and are negative.        Past Medical History:   Diagnosis Date    Abnormal Pap smear of cervix     in the past     Arthralgia 2020    Arthritis     Carpal tunnel syndrome     Right    Functional heart murmur in      GERD (gastroesophageal reflux disease)     no meds    Heart murmur     History of paroxysmal supraventricular tachycardia     History of PSVT (paroxysmal supraventricular tachycardia) 3/9/2018    History of urinary tract infection     recurrent in past     Hyperlipidemia     Hypertension     Miscarriage     Urinary tract infection     Uterine leiomyoma     had hysterectomy    Wears contact lenses     Wears glasses      Past Surgical History:   Procedure Laterality Date    BUNIONECTOMY Right 2016    HERNIA REPAIR      pt was 10months  old    HYSTERECTOMY      LASER ABLATION N/A     vaginal lesion(s)    MOLE EXCISION      OOPHORECTOMY      MO CYSTOURETHROSCOPY N/A 01/12/2016    Procedure: CYSTOSCOPY;  Surgeon: Darcy Álvarez DO;  [facility];  Service: Gynecology    MO ENDOSCOPIC PLANTAR FASCIOTOMY Right 07/21/2017    Procedure: RELEASE FASCIA PLANTAR/FASCIOTOMY ENDOSCOPIC (EPF);  Surgeon: Liban Ivan DPM;  [facility];  Service: Podiatry    MO FASCIECTOMY PLANTAR FASCIA PARTIAL SPX Left 04/08/2019    Procedure: LEFT  INSTEP PLANTAR FASCIOTOMY;  Surgeon: Morgan Mcintyre DPM;  Location: [facility];  Service: Podiatry    MO LAPS TOTAL HYSTERECT 250 GM/< W/RMVL TUBE/OVARY N/A 01/12/2016    Procedure: TOTAL LAPAROSCOPIC HYSTERECTOMY ;  Surgeon: Darcy Álvarez DO;  [facility];  Service: Gynecology    MO NDSC WRST SURG W/RLS TRANSVRS CARPL LIGM Right 01/29/2016    Procedure: ENDOSCOPIC CARPAL TUNNEL RELEASE;  Surgeon: Javier Montague MD;  [facility];  Service: Orthopedics    SALPINGECTOMY Bilateral 01/12/2016    Procedure: REMOVAL OF BOTH FALLOPIAN TUBES;  Surgeon: Darcy Álvarez DO;  [facility];  Service:     TUBAL LIGATION       Social History     Substance and Sexual Activity   Alcohol Use Yes    Alcohol/week: 2.0 standard drinks of alcohol    Types: 2 Glasses of wine per week    Comment: occasional alcohol use     Social History     Substance and Sexual Activity   Drug Use Never     Social History     Tobacco Use   Smoking Status Never   Smokeless Tobacco Never     Family History   Problem Relation Age of Onset    Cancer Mother         rectal    Diabetes Mother     Uterine cancer Mother     Obesity Mother     Heart disease Mother     COPD Father     Hypothyroidism Sister     Scleroderma Sister     No Known Problems Sister     No Known Problems Daughter     No Known Problems Daughter     Heart failure Maternal Grandmother     Heart failure Maternal Grandfather     Heart attack Paternal Grandmother      No Known Problems Paternal Grandfather     Atrial fibrillation Brother     Scleroderma Brother     No Known Problems Son        Allergies:  Allergies   Allergen Reactions    Levaquin [Levofloxacin] Anaphylaxis and Swelling     L       Medications:     Current Outpatient Medications:     atorvastatin (LIPITOR) 20 mg tablet, Take 1 tablet (20 mg total) by mouth daily, Disp: 90 tablet, Rfl: 3    chlorthalidone 25 mg tablet, Take 1 tablet 4 days a week every Monday, Wednesday, Friday, and Sunday., Disp: 360 tablet, Rfl: 3    Cranberry-Vit C-Lactobacillus (CRANBERRY/PROBIOTIC/VIT C) 450-30 MG TABS, Take 1 tablet by mouth daily, Disp: , Rfl:     estradiol (ESTRACE) 0.5 MG tablet, TAKE ONE TABLET BY MOUTH EVERY DAY, Disp: 90 tablet, Rfl: 3    meloxicam (MOBIC) 15 mg tablet, TAKE ONE TABLET BY MOUTH EVERY DAY, Disp: 90 tablet, Rfl: 0    metoprolol succinate (TOPROL-XL) 50 mg 24 hr tablet, Take 1 tablet (50 mg total) by mouth daily, Disp: 90 tablet, Rfl: 3    olmesartan (BENICAR) 20 mg tablet, Take 1 tablet (20 mg total) by mouth daily, Disp: 90 tablet, Rfl: 3    omeprazole (PriLOSEC OTC) 20 MG tablet, Take 1 tablet (20 mg total) by mouth daily, Disp: 100 tablet, Rfl: 5    Progesterone 100 MG CAPS, TAKE ONE CAPSULE BY MOUTH DAILY AT BEDTIME, Disp: 90 capsule, Rfl: 3    TURMERIC CURCUMIN PO, Take by mouth, Disp: , Rfl:       Wt Readings from Last 3 Encounters:   09/17/24 71.2 kg (157 lb)   06/24/24 73.5 kg (162 lb)   06/11/24 73 kg (161 lb)     Temp Readings from Last 3 Encounters:   06/11/24 98.1 °F (36.7 °C) (Temporal)   01/29/24 97.5 °F (36.4 °C) (Temporal)   11/06/22 99.9 °F (37.7 °C) (Temporal)     BP Readings from Last 3 Encounters:   09/17/24 138/80   06/24/24 126/82   06/11/24 146/85     Pulse Readings from Last 3 Encounters:   09/17/24 80   06/11/24 73   01/29/24 69         Physical Exam  HENT:      Head: Atraumatic.      Mouth/Throat:      Mouth: Mucous membranes are moist.   Eyes:      Extraocular Movements:  Extraocular movements intact.   Cardiovascular:      Rate and Rhythm: Normal rate and regular rhythm.      Heart sounds: Normal heart sounds.   Pulmonary:      Effort: Pulmonary effort is normal.      Breath sounds: Normal breath sounds.   Abdominal:      General: Abdomen is flat.   Musculoskeletal:         General: Normal range of motion.      Cervical back: Normal range of motion.   Skin:     General: Skin is warm.   Neurological:      General: No focal deficit present.      Mental Status: She is alert and oriented to person, place, and time.   Psychiatric:         Mood and Affect: Mood normal.         Behavior: Behavior normal.           Laboratory Studies:  CMP:  Lab Results   Component Value Date     03/19/2015    K 3.9 01/16/2024    CL 99 01/16/2024    CO2 30 01/16/2024    ANIONGAP 8 03/19/2015    BUN 16 01/16/2024    CREATININE 0.95 01/16/2024    GLUCOSE 83 03/19/2015    AST 23 01/16/2024    ALT 28 01/16/2024    BILITOT 0.59 03/19/2015    EGFR 67 01/16/2024       Lipid Profile:   Lab Results   Component Value Date    CHOL 212 06/30/2015     Lab Results   Component Value Date    HDL 60 03/30/2024     Lab Results   Component Value Date    LDLCALC 77 03/30/2024     Lab Results   Component Value Date    TRIG 251 (H) 03/30/2024       Cardiac testing:   EKG reviewed personally: DEMETRIUS CARY

## 2024-10-11 ENCOUNTER — OFFICE VISIT (OUTPATIENT)
Dept: FAMILY MEDICINE CLINIC | Facility: MEDICAL CENTER | Age: 59
End: 2024-10-11
Payer: COMMERCIAL

## 2024-10-11 VITALS
OXYGEN SATURATION: 96 % | TEMPERATURE: 98 F | HEIGHT: 62 IN | HEART RATE: 84 BPM | BODY MASS INDEX: 29.26 KG/M2 | DIASTOLIC BLOOD PRESSURE: 70 MMHG | WEIGHT: 159 LBS | SYSTOLIC BLOOD PRESSURE: 110 MMHG

## 2024-10-11 DIAGNOSIS — M85.859 OSTEOPENIA OF NECK OF FEMUR, UNSPECIFIED LATERALITY: ICD-10-CM

## 2024-10-11 DIAGNOSIS — Z11.59 NEED FOR HEPATITIS C SCREENING TEST: ICD-10-CM

## 2024-10-11 DIAGNOSIS — E78.2 MIXED DYSLIPIDEMIA: ICD-10-CM

## 2024-10-11 DIAGNOSIS — Z23 ENCOUNTER FOR IMMUNIZATION: ICD-10-CM

## 2024-10-11 DIAGNOSIS — M25.50 ARTHRALGIA, UNSPECIFIED JOINT: ICD-10-CM

## 2024-10-11 DIAGNOSIS — J98.01 BRONCHOSPASM: ICD-10-CM

## 2024-10-11 DIAGNOSIS — I10 ESSENTIAL HYPERTENSION: ICD-10-CM

## 2024-10-11 DIAGNOSIS — Z11.4 SCREENING FOR HIV (HUMAN IMMUNODEFICIENCY VIRUS): ICD-10-CM

## 2024-10-11 DIAGNOSIS — Z00.00 ANNUAL PHYSICAL EXAM: Primary | ICD-10-CM

## 2024-10-11 DIAGNOSIS — R73.03 PREDIABETES: ICD-10-CM

## 2024-10-11 PROBLEM — G43.109 OCULAR MIGRAINE: Status: ACTIVE | Noted: 2023-05-10

## 2024-10-11 PROCEDURE — 99214 OFFICE O/P EST MOD 30 MIN: CPT | Performed by: STUDENT IN AN ORGANIZED HEALTH CARE EDUCATION/TRAINING PROGRAM

## 2024-10-11 PROCEDURE — 99396 PREV VISIT EST AGE 40-64: CPT | Performed by: STUDENT IN AN ORGANIZED HEALTH CARE EDUCATION/TRAINING PROGRAM

## 2024-10-11 PROCEDURE — 90715 TDAP VACCINE 7 YRS/> IM: CPT

## 2024-10-11 PROCEDURE — 90471 IMMUNIZATION ADMIN: CPT

## 2024-10-11 RX ORDER — MELOXICAM 15 MG/1
15 TABLET ORAL DAILY PRN
Qty: 90 TABLET | Refills: 0 | Status: SHIPPED | OUTPATIENT
Start: 2024-10-11

## 2024-10-11 RX ORDER — ALBUTEROL SULFATE 90 UG/1
2 INHALANT RESPIRATORY (INHALATION) EVERY 6 HOURS PRN
Qty: 8.5 G | Refills: 0 | Status: SHIPPED | OUTPATIENT
Start: 2024-10-11

## 2024-10-11 NOTE — PROGRESS NOTES
[facility] HEALTH MAINTENANCE OFFICE VISIT  [facility] [place]    NAME: Liliana Chavez  AGE: 59 y.o. SEX: female  : 1965     DATE: 10/11/2024    Assessment and Plan     1. Annual physical exam  2. Need for hepatitis C screening test  -     Hepatitis C Antibody; Future  3. Screening for HIV (human immunodeficiency virus)  -     HIV 1/2 AG/AB w Reflex [facility] for 2 yr old and above; Future  4. BMI 29.0-29.9,adult  5. Encounter for immunization  -     TDAP VACCINE GREATER THAN OR EQUAL TO 6YO IM  6. Osteopenia of neck of femur, unspecified laterality  -     DXA bone density spine hip and pelvis; Future; Expected date: 10/11/2024  7. Mixed dyslipidemia  -     Lipid Panel with Direct LDL reflex; Future; Expected date: 2025  8. Prediabetes  -     Hemoglobin A1C; Future; Expected date: 2025  9. Essential hypertension  -     Basic metabolic panel; Future; Expected date: 10/11/2024  -     Comprehensive metabolic panel; Future; Expected date: 2025  10. Arthralgia, unspecified joint  -     meloxicam (MOBIC) 15 mg tablet; Take 1 tablet (15 mg total) by mouth daily as needed for moderate pain  11. Bronchospasm        - Discussed how allergy symptoms can be causing bronchospasm        - Advised about managing allergy symptoms such as postnasal drip with oral antihistamine and Flonase        - Albuterol inhaler as needed  -     albuterol (ProAir HFA) 90 mcg/act inhaler; Inhale 2 puffs every 6 (six) hours as needed for wheezing  -     Complete PFT with post bronchodilator; Future      Patient Counseling:   Nutrition: Stressed importance of a well balanced diet, moderation of sodium/saturated fat, caloric balance and sufficient intake of fiber  Exercise: Stressed the importance of regular exercise with a goal of 150 minutes per week  Dental Health: Discussed daily flossing and brushing and regular dental visits   Alcohol Use:  Recommended moderation of alcohol  intake  Injury Prevention: Discussed Safety Belts, Safety Helmets, and Smoke Detectors  Counseled about sunscreen use and sun protection  Immunizations reviewed:   Will receive influenza vaccine through her employer  Completed Shingrix vaccination series  Informed about update with COVID-19 vaccine  Counseled about Tdap, she would like to receive today  Discussed benefits of:    Established with gynecology for woman care  Mammogram scheduled  DXA due   Colonoscopy next due January 2027  Agreeable to preventative screenings for hepatitis C and HIV  BMI Counseling: Body mass index is 29.08 kg/m². Discussed with patient's BMI with her.     Follow-up in 6 months and sooner as needed.  Chief Complaint     Chief Complaint   Patient presents with    Annual Exam    Shortness of Breath     Accompanied with tightness in chest; symptoms have been occurring for the last 6 months        History of Present Illness     HPI    Well Adult Physical   Patient here for a comprehensive physical exam.  Patient also mentions that she has been experiencing postnasal drip and allergy symptoms lately which seem to be causing some intermittent chest tightness and shortness of breath.  No chest pain.  No shortness of breath upon walking short distances.        Diet and Physical Activity  Diet: enjoys vegetables, homemade meals, protein daily  Exercise: several times per week strength training     Depression Screen  PHQ-2/9 Depression Screening    Little interest or pleasure in doing things: 0 - not at all  Feeling down, depressed, or hopeless: 0 - not at all  PHQ-2 Score: 0  PHQ-2 Interpretation: Negative depression screen          General Health  Hearing: Normal:  bilateral  Vision: goes for regular eye exams and wears glasses and contacts  Dental: regular dental visits, brushes teeth twice daily, and flosses teeth daily    Reproductive Health  Follows with gynecologist      The following portions of the patient's history were reviewed and  updated as appropriate: allergies, current medications, past family history, past medical history, past social history, past surgical history and problem list.    Review of Systems     Review of Systems    As noted in HPI     Past Medical History     Past Medical History:   Diagnosis Date    Abnormal Pap smear of cervix     in the past     Arthralgia 2020    Arthritis     Carpal tunnel syndrome     Right    Functional heart murmur in      GERD (gastroesophageal reflux disease)     no meds    Heart murmur     History of paroxysmal supraventricular tachycardia     History of PSVT (paroxysmal supraventricular tachycardia) 3/9/2018    History of urinary tract infection     recurrent in past     Hyperlipidemia     Hypertension     Miscarriage     Urinary tract infection     Uterine leiomyoma     had hysterectomy    Wears contact lenses     Wears glasses        Past Surgical History     Past Surgical History:   Procedure Laterality Date    BUNIONECTOMY Right 2016    HERNIA REPAIR      pt was 10months old    HYSTERECTOMY      LASER ABLATION N/A     vaginal lesion(s)    MOLE EXCISION      OOPHORECTOMY      MO CYSTOURETHROSCOPY N/A 2016    Procedure: CYSTOSCOPY;  Surgeon: Darcy Álvarez DO;  [facility];  Service: Gynecology    MO ENDOSCOPIC PLANTAR FASCIOTOMY Right 2017    Procedure: RELEASE FASCIA PLANTAR/FASCIOTOMY ENDOSCOPIC (EPF);  Surgeon: Liban Ivan DPM;  [facility];  Service: Podiatry    MO FASCIECTOMY PLANTAR FASCIA PARTIAL SPX Left 2019    Procedure: LEFT  INSTEP PLANTAR FASCIOTOMY;  Surgeon: Morgan Mcintyre DPM;  Location:  MAIN OR;  Service: Podiatry    MO LAPS TOTAL HYSTERECT 250 GM/< W/RMVL TUBE/OVARY N/A 2016    Procedure: TOTAL LAPAROSCOPIC HYSTERECTOMY ;  Surgeon: Darcy Álvarez DO;  [facility];  Service: Gynecology    MO NDSC WRST SURG W/RLS TRANSVRS CARPL LIGM Right 2016    Procedure: ENDOSCOPIC CARPAL TUNNEL RELEASE;  Surgeon:  Javier Montague MD;  [facility];  Service: Orthopedics    SALPINGECTOMY Bilateral 01/12/2016    Procedure: REMOVAL OF BOTH FALLOPIAN TUBES;  Surgeon: Darcy Álvarez DO;  [facility];  Service:     TUBAL LIGATION         Social History     Social History     Socioeconomic History    Marital status:      Spouse name: None    Number of children: None    Years of education: None    Highest education level: None   Occupational History    None   Tobacco Use    Smoking status: Never    Smokeless tobacco: Never   Vaping Use    Vaping status: Never Used   Substance and Sexual Activity    Alcohol use: Yes     Alcohol/week: 2.0 standard drinks of alcohol     Types: 2 Glasses of wine per week     Comment: occasional alcohol use    Drug use: Never    Sexual activity: Yes     Partners: Male     Birth control/protection: Post-menopausal, Female Sterilization, None   Other Topics Concern    None   Social History Narrative    Per allscripts;     Caffeine use    Coffee    Exercise: Walking         Social Determinants of Health     Financial Resource Strain: Not on file   Food Insecurity: Not on file   Transportation Needs: Not on file   Physical Activity: Not on file   Stress: Not on file   Social Connections: Not on file   Intimate Partner Violence: Not on file   Housing Stability: Not on file       Family History     Family History   Problem Relation Age of Onset    Cancer Mother         rectal    Diabetes Mother     Uterine cancer Mother     Obesity Mother     Heart disease Mother     COPD Father     Hypothyroidism Sister     Scleroderma Sister     No Known Problems Sister     No Known Problems Daughter     No Known Problems Daughter     Heart failure Maternal Grandmother     Heart failure Maternal Grandfather     Heart attack Paternal Grandmother     No Known Problems Paternal Grandfather     Atrial fibrillation Brother     Scleroderma Brother     No Known Problems Son        Current  "Medications       Current Outpatient Medications:     albuterol (ProAir HFA) 90 mcg/act inhaler, Inhale 2 puffs every 6 (six) hours as needed for wheezing, Disp: 8.5 g, Rfl: 0    atorvastatin (LIPITOR) 20 mg tablet, Take 1 tablet (20 mg total) by mouth daily, Disp: 90 tablet, Rfl: 3    chlorthalidone 25 mg tablet, Take 1 tablet 4 days a week every Monday, Wednesday, Friday, and Sunday., Disp: 360 tablet, Rfl: 3    Cranberry-Vit C-Lactobacillus (CRANBERRY/PROBIOTIC/VIT C) 450-30 MG TABS, Take 1 tablet by mouth daily, Disp: , Rfl:     estradiol (ESTRACE) 0.5 MG tablet, TAKE ONE TABLET BY MOUTH EVERY DAY, Disp: 90 tablet, Rfl: 3    meloxicam (MOBIC) 15 mg tablet, Take 1 tablet (15 mg total) by mouth daily as needed for moderate pain, Disp: 90 tablet, Rfl: 0    metoprolol succinate (TOPROL-XL) 50 mg 24 hr tablet, Take 1 tablet (50 mg total) by mouth 2 (two) times a day, Disp: 180 tablet, Rfl: 3    olmesartan (BENICAR) 20 mg tablet, Take 1 tablet (20 mg total) by mouth daily, Disp: 90 tablet, Rfl: 3    omeprazole (PriLOSEC OTC) 20 MG tablet, Take 1 tablet (20 mg total) by mouth daily, Disp: 100 tablet, Rfl: 5    Progesterone 100 MG CAPS, TAKE ONE CAPSULE BY MOUTH DAILY AT BEDTIME, Disp: 90 capsule, Rfl: 3    TURMERIC CURCUMIN PO, Take by mouth, Disp: , Rfl:      Allergies     Allergies   Allergen Reactions    Levaquin [Levofloxacin] Anaphylaxis and Swelling     L       Objective     /70 (BP Location: Left arm, Patient Position: Sitting, Cuff Size: Standard)   Pulse 84   Temp 98 °F (36.7 °C) (Temporal)   Ht 5' 2\" (1.575 m)   Wt 72.1 kg (159 lb)   SpO2 96%   BMI 29.08 kg/m²      Physical Exam  Vitals reviewed.   Constitutional:       General: She is not in acute distress.     Appearance: Normal appearance. She is well-developed.   HENT:      Head: Normocephalic and atraumatic.      Right Ear: Tympanic membrane, ear canal and external ear normal.      Left Ear: Tympanic membrane, ear canal and external ear " normal.      Nose: Nose normal.      Mouth/Throat:      Mouth: Mucous membranes are moist.      Pharynx: Posterior oropharyngeal erythema present. No oropharyngeal exudate.   Eyes:      General:         Right eye: No discharge.         Left eye: No discharge.      Extraocular Movements: Extraocular movements intact.      Conjunctiva/sclera: Conjunctivae normal.      Pupils: Pupils are equal, round, and reactive to light.   Cardiovascular:      Rate and Rhythm: Normal rate and regular rhythm.      Pulses: Normal pulses.   Pulmonary:      Effort: Pulmonary effort is normal. No respiratory distress.      Breath sounds: Normal breath sounds. No rales.   Abdominal:      General: Abdomen is flat. Bowel sounds are normal.      Palpations: Abdomen is soft.      Tenderness: There is no abdominal tenderness.   Musculoskeletal:      Cervical back: Neck supple.      Right lower leg: No edema.      Left lower leg: No edema.   Lymphadenopathy:      Cervical: No cervical adenopathy.   Skin:     General: Skin is warm and dry.   Neurological:      Mental Status: She is alert and oriented to person, place, and time.   Psychiatric:         Mood and Affect: Mood normal.         Behavior: Behavior normal.         Thought Content: Thought content normal.         Judgment: Judgment normal.           No results found.        Rich Baldwin DO  [facility]

## 2024-10-11 NOTE — PATIENT INSTRUCTIONS
"Patient Education     Routine physical for adults   The Basics   Written by the doctors and editors at [facility]   What is a physical? -- A physical is a routine visit, or \"check-up,\" with your doctor. You might also hear it called a \"wellness visit\" or \"preventive visit.\"  During each visit, the doctor will:   Ask about your physical and mental health   Ask about your habits, behaviors, and lifestyle   Do an exam   Give you vaccines if needed   Talk to you about any medicines you take   Give advice about your health   Answer your questions  Getting regular check-ups is an important part of taking care of your health. It can help your doctor find and treat any problems you have. But it's also important for preventing health problems.  A routine physical is different from a \"sick visit.\" A sick visit is when you see a doctor because of a health concern or problem. Since physicals are scheduled ahead of time, you can think about what you want to ask the doctor.  How often should I get a physical? -- It depends on your age and health. In general, for people age 21 years and older:   If you are younger than 50 years, you might be able to get a physical every 3 years.   If you are 50 years or older, your doctor might recommend a physical every year.  If you have an ongoing health condition, like diabetes or high blood pressure, your doctor will probably want to see you more often.  What happens during a physical? -- In general, each visit will include:   Physical exam - The doctor or nurse will check your height, weight, heart rate, and blood pressure. They will also look at your eyes and ears. They will ask about how you are feeling and whether you have any symptoms that bother you.   Medicines - It's a good idea to bring a list of all the medicines you take to each doctor visit. Your doctor will talk to you about your medicines and answer any questions. Tell them if you are having any side effects that bother you. You " "should also tell them if you are having trouble paying for any of your medicines.   Habits and behaviors - This includes:   Your diet   Your exercise habits   Whether you smoke, drink alcohol, or use drugs   Whether you are sexually active   Whether you feel safe at home  Your doctor will talk to you about things you can do to improve your health and lower your risk of health problems. They will also offer help and support. For example, if you want to quit smoking, they can give you advice and might prescribe medicines. If you want to improve your diet or get more physical activity, they can help you with this, too.   Lab tests, if needed - The tests you get will depend on your age and situation. For example, your doctor might want to check your:   Cholesterol   Blood sugar   Iron level   Vaccines - The recommended vaccines will depend on your age, health, and what vaccines you already had. Vaccines are very important because they can prevent certain serious or deadly infections.   Discussion of screening - \"Screening\" means checking for diseases or other health problems before they cause symptoms. Your doctor can recommend screening based on your age, risk, and preferences. This might include tests to check for:   Cancer, such as breast, prostate, cervical, ovarian, colorectal, prostate, lung, or skin cancer   Sexually transmitted infections, such as chlamydia and gonorrhea   Mental health conditions like depression and anxiety  Your doctor will talk to you about the different types of screening tests. They can help you decide which screenings to have. They can also explain what the results might mean.   Answering questions - The physical is a good time to ask the doctor or nurse questions about your health. If needed, they can refer you to other doctors or specialists, too.  Adults older than 65 years often need other care, too. As you get older, your doctor will talk to you about:   How to prevent falling at " home   Hearing or vision tests   Memory testing   How to take your medicines safely   Making sure that you have the help and support you need at home  All topics are updated as new evidence becomes available and our peer review process is complete.  This topic retrieved from [employer] on: May 02, 2024.  Topic 978325 Version 1.0  Release: 32.4.3 - C32.122  © 2024 UpToDate, Inc. and/or its affiliates. All rights reserved.  Consumer Information Use and Disclaimer   Disclaimer: This generalized information is a limited summary of diagnosis, treatment, and/or medication information. It is not meant to be comprehensive and should be used as a tool to help the user understand and/or assess potential diagnostic and treatment options. It does NOT include all information about conditions, treatments, medications, side effects, or risks that may apply to a specific patient. It is not intended to be medical advice or a substitute for the medical advice, diagnosis, or treatment of a health care provider based on the health care provider's examination and assessment of a patient's specific and unique circumstances. Patients must speak with a health care provider for complete information about their health, medical questions, and treatment options, including any risks or benefits regarding use of medications. This information does not endorse any treatments or medications as safe, effective, or approved for treating a specific patient. UpToDate, Inc. and its affiliates disclaim any warranty or liability relating to this information or the use thereof.The use of this information is governed by the Terms of Use, available at https://www.woltersJambooluwer.com/en/know/clinical-effectiveness-terms. 2024© UpToDate, Inc. and its affiliates and/or licensors. All rights reserved.  Copyright   © 2024 UpToDate, Inc. and/or its affiliates. All rights reserved.

## 2024-10-18 ENCOUNTER — HOSPITAL ENCOUNTER (OUTPATIENT)
Dept: RADIOLOGY | Facility: MEDICAL CENTER | Age: 59
Discharge: HOME | End: 2024-10-18
Payer: COMMERCIAL

## 2024-10-18 VITALS — WEIGHT: 159 LBS | HEIGHT: 62 IN | BODY MASS INDEX: 29.26 KG/M2

## 2024-10-18 PROCEDURE — 77067 SCR MAMMO BI INCL CAD: CPT

## 2024-10-18 PROCEDURE — 77063 BREAST TOMOSYNTHESIS BI: CPT

## 2024-12-14 ENCOUNTER — HOSPITAL ENCOUNTER (EMERGENCY)
Facility: HOSPITAL | Age: 59
Discharge: HOME | End: 2024-12-14
Attending: EMERGENCY MEDICINE
Payer: COMMERCIAL

## 2024-12-14 ENCOUNTER — APPOINTMENT (EMERGENCY)
Dept: CT IMAGING | Facility: HOSPITAL | Age: 59
End: 2024-12-14
Payer: COMMERCIAL

## 2024-12-14 VITALS
DIASTOLIC BLOOD PRESSURE: 87 MMHG | RESPIRATION RATE: 18 BRPM | HEART RATE: 66 BPM | OXYGEN SATURATION: 99 % | TEMPERATURE: 98.3 F | SYSTOLIC BLOOD PRESSURE: 175 MMHG

## 2024-12-14 DIAGNOSIS — R31.29 MICROSCOPIC HEMATURIA: ICD-10-CM

## 2024-12-14 DIAGNOSIS — R10.31 RIGHT LOWER QUADRANT ABDOMINAL PAIN: Primary | ICD-10-CM

## 2024-12-14 LAB
ALBUMIN SERPL BCG-MCNC: 4.5 G/DL (ref 3.5–5)
ALP SERPL-CCNC: 49 U/L (ref 34–104)
ALT SERPL W P-5'-P-CCNC: 17 U/L (ref 7–52)
ANION GAP SERPL CALCULATED.3IONS-SCNC: 7 MMOL/L (ref 4–13)
AST SERPL W P-5'-P-CCNC: 16 U/L (ref 13–39)
BACTERIA UR QL AUTO: NORMAL /HPF
BASOPHILS # BLD AUTO: 0.05 THOUSANDS/ÂΜL (ref 0–0.1)
BASOPHILS NFR BLD AUTO: 1 % (ref 0–1)
BILIRUB SERPL-MCNC: 0.3 MG/DL (ref 0.2–1)
BILIRUB UR QL STRIP: NEGATIVE
BUN SERPL-MCNC: 22 MG/DL (ref 5–25)
CALCIUM SERPL-MCNC: 9.4 MG/DL (ref 8.4–10.2)
CHLORIDE SERPL-SCNC: 103 MMOL/L (ref 96–108)
CLARITY UR: CLEAR
CO2 SERPL-SCNC: 25 MMOL/L (ref 21–32)
COLOR UR: ABNORMAL
CREAT SERPL-MCNC: 0.86 MG/DL (ref 0.6–1.3)
EOSINOPHIL # BLD AUTO: 0.05 THOUSAND/ÂΜL (ref 0–0.61)
EOSINOPHIL NFR BLD AUTO: 1 % (ref 0–6)
ERYTHROCYTE [DISTWIDTH] IN BLOOD BY AUTOMATED COUNT: 12.5 % (ref 11.6–15.1)
GFR SERPL CREATININE-BSD FRML MDRD: 75 ML/MIN/1.73SQ M
GLUCOSE SERPL-MCNC: 95 MG/DL (ref 65–140)
GLUCOSE UR STRIP-MCNC: NEGATIVE MG/DL
HCT VFR BLD AUTO: 38.9 % (ref 34.8–46.1)
HGB BLD-MCNC: 13.3 G/DL (ref 11.5–15.4)
HGB UR QL STRIP.AUTO: ABNORMAL
IMM GRANULOCYTES # BLD AUTO: 0.04 THOUSAND/UL (ref 0–0.2)
IMM GRANULOCYTES NFR BLD AUTO: 0 % (ref 0–2)
KETONES UR STRIP-MCNC: NEGATIVE MG/DL
LEUKOCYTE ESTERASE UR QL STRIP: NEGATIVE
LYMPHOCYTES # BLD AUTO: 1.86 THOUSANDS/ÂΜL (ref 0.6–4.47)
LYMPHOCYTES NFR BLD AUTO: 17 % (ref 14–44)
MCH RBC QN AUTO: 31.7 PG (ref 26.8–34.3)
MCHC RBC AUTO-ENTMCNC: 34.2 G/DL (ref 31.4–37.4)
MCV RBC AUTO: 93 FL (ref 82–98)
MONOCYTES # BLD AUTO: 0.69 THOUSAND/ÂΜL (ref 0.17–1.22)
MONOCYTES NFR BLD AUTO: 6 % (ref 4–12)
NEUTROPHILS # BLD AUTO: 8.11 THOUSANDS/ÂΜL (ref 1.85–7.62)
NEUTS SEG NFR BLD AUTO: 75 % (ref 43–75)
NITRITE UR QL STRIP: NEGATIVE
NON-SQ EPI CELLS URNS QL MICRO: NORMAL /HPF
NRBC BLD AUTO-RTO: 0 /100 WBCS
PH UR STRIP.AUTO: 5.5 [PH]
PLATELET # BLD AUTO: 310 THOUSANDS/UL (ref 149–390)
PMV BLD AUTO: 10.2 FL (ref 8.9–12.7)
POTASSIUM SERPL-SCNC: 3.7 MMOL/L (ref 3.5–5.3)
PROT SERPL-MCNC: 7.4 G/DL (ref 6.4–8.4)
PROT UR STRIP-MCNC: NEGATIVE MG/DL
RBC # BLD AUTO: 4.2 MILLION/UL (ref 3.81–5.12)
RBC #/AREA URNS AUTO: NORMAL /HPF
SODIUM SERPL-SCNC: 135 MMOL/L (ref 135–147)
SP GR UR STRIP.AUTO: 1.02 (ref 1–1.03)
UROBILINOGEN UR STRIP-ACNC: <2 MG/DL
WBC # BLD AUTO: 10.8 THOUSAND/UL (ref 4.31–10.16)
WBC #/AREA URNS AUTO: NORMAL /HPF

## 2024-12-14 PROCEDURE — 96374 THER/PROPH/DIAG INJ IV PUSH: CPT

## 2024-12-14 PROCEDURE — 85025 COMPLETE CBC W/AUTO DIFF WBC: CPT

## 2024-12-14 PROCEDURE — 36415 COLL VENOUS BLD VENIPUNCTURE: CPT

## 2024-12-14 PROCEDURE — 99284 EMERGENCY DEPT VISIT MOD MDM: CPT

## 2024-12-14 PROCEDURE — 96375 TX/PRO/DX INJ NEW DRUG ADDON: CPT

## 2024-12-14 PROCEDURE — 96361 HYDRATE IV INFUSION ADD-ON: CPT

## 2024-12-14 PROCEDURE — 99285 EMERGENCY DEPT VISIT HI MDM: CPT | Performed by: EMERGENCY MEDICINE

## 2024-12-14 PROCEDURE — 81001 URINALYSIS AUTO W/SCOPE: CPT

## 2024-12-14 PROCEDURE — 80053 COMPREHEN METABOLIC PANEL: CPT

## 2024-12-14 PROCEDURE — 74177 CT ABD & PELVIS W/CONTRAST: CPT

## 2024-12-14 RX ORDER — ONDANSETRON 2 MG/ML
4 INJECTION INTRAMUSCULAR; INTRAVENOUS ONCE
Status: COMPLETED | OUTPATIENT
Start: 2024-12-14 | End: 2024-12-14

## 2024-12-14 RX ORDER — ACETAMINOPHEN 10 MG/ML
1000 INJECTION, SOLUTION INTRAVENOUS ONCE
Status: COMPLETED | OUTPATIENT
Start: 2024-12-14 | End: 2024-12-14

## 2024-12-14 RX ADMIN — ACETAMINOPHEN 1000 MG: 1000 INJECTION, SOLUTION INTRAVENOUS at 19:16

## 2024-12-14 RX ADMIN — SODIUM CHLORIDE 1000 ML: 0.9 INJECTION, SOLUTION INTRAVENOUS at 19:14

## 2024-12-14 RX ADMIN — ONDANSETRON 4 MG: 2 INJECTION, SOLUTION INTRAMUSCULAR; INTRAVENOUS at 19:19

## 2024-12-14 RX ADMIN — IOHEXOL 85 ML: 350 INJECTION, SOLUTION INTRAVENOUS at 19:54

## 2024-12-14 NOTE — ED NOTES
Sterile cup and cleansing wipe provided to patient for urine specimen. Pt vocalizes understanding of clean catch technique.      Vero Robertson RN  12/14/24 5698

## 2024-12-15 NOTE — ED ATTENDING ATTESTATION
12/14/2024  IRosalina DO, saw and evaluated the patient. I have discussed the patient with the resident/non-physician practitioner and agree with the resident's/non-physician practitioner's findings, Plan of Care, and MDM as documented in the resident's/non-physician practitioner's note, except where noted. All available labs and Radiology studies were reviewed.  I was present for key portions of any procedure(s) performed by the resident/non-physician practitioner and I was immediately available to provide assistance.       At this point I agree with the current assessment done in the Emergency Department.  I have conducted an independent evaluation of this patient a history and physical is as follows:    ED Course   59-year-old female presents to the emergency department for evaluation of waxing and waning right lower quadrant abdominal pain.  Patient states initially she thought the symptoms were secondary to gas but did not have any improvement.  Patient is having nausea when the pain is severe.  At times the pain radiates into the right flank.  She has had no fever or chills.  No vomiting.  No dysuria or hematuria.    Past medical history: Hysterectomy, hypertension, hyperlipidemia    Physical exam: Patient is awake and alert, no acute distress.  Resting comfortably.  Pupils are equal and reactive.  Neck is supple without JVD.  Heart is regular rate and rhythm without ectopy.  Lungs are clear to auscultation.  Chest wall is nontender to palpation.  Abdomen is soft,  nondistended with normal active bowel sounds.  There is tenderness in the right lower quadrant without rebound or guarding.  No CVA tenderness.  No lower extremity edema.  No focal motor or sensory deficits.  Speech is clear and appropriate    Assessment/plan: 59-year-old female presents with waxing and waning right lower quadrant abdominal pain.  Differential diagnosis includes but is not limited to pain secondary to renal colic, cystitis,  appendicitis, ovarian torsion, ovarian cyst with or without rupture, neoplasm, adhesions, gas.\\    Will check labs, treat pain start IV fluid hydration and treat nausea.  Will check CT scan abdomen pelvis to evaluate for right lower quadrant pathology, reassess.    Critical Care Time  Procedures

## 2024-12-15 NOTE — ED PROVIDER NOTES
"Time reflects when diagnosis was documented in both MDM as applicable and the Disposition within this note       Time User Action Codes Description Comment    12/14/2024  9:31 PM Germain Dubois Add [R10.31] Right lower quadrant abdominal pain     12/14/2024  9:31 PM Germain Dubois Add [R31.29] Microscopic hematuria           ED Disposition       ED Disposition   Discharge    Condition   Stable    Date/Time   Sat Dec 14, 2024  9:23 PM    Comment   Liliana Chavez discharge to home/self care.                   Assessment & Plan       Medical Decision Making  Differential diagnose includes but not limited to: Nephrolithiasis, UTI, pyelonephritis, enterocolitis.    Liliana came in with sudden onset right lower quadrant pain started this morning.  She states it is waxing waning in severity.  CT abdomen pelvis to rule assess for pyelonephritis, nephrolithiasis, enterocolitis.  UA to assess for UTI, pyelonephritis.      Lab work unremarkable no signs of MICHAELLE, UTI.  CT negative for any acute abnormalities.  Patient had 1 episode of the pain while here in the ED, self resolved and did not return.  It was explained that she might have had a kidney stone that passed, and was instructed to strain all her urine at home to assess for no more.  Otherwise she was asymptomatic and stable for discharge.  Instructed to follow closely with her PCP.  Patient understands and agrees with the plan.  Strict return precautions given if symptoms are worsening or not resolving.  Patient discharged in stable condition.      Portions of the record have been created with voice recognition software.  Occasional wrong word or \"sound a like\" substitution may have occurred due to the inherent limitations of voice recognition software.  Read the chart carefully and recognize, using context, where substitutions have occurred.       Amount and/or Complexity of Data Reviewed  Labs: ordered.  Radiology: ordered.    Risk  Prescription drug " management.             Medications   sodium chloride 0.9 % bolus 1,000 mL (0 mL Intravenous Stopped 24)   ondansetron (ZOFRAN) injection 4 mg (4 mg Intravenous Given 24)   acetaminophen (Ofirmev) injection 1,000 mg (0 mg Intravenous Stopped 24)   iohexol (OMNIPAQUE) 350 MG/ML injection (MULTI-DOSE) 85 mL (85 mL Intravenous Given 24)       ED Risk Strat Scores                          SBIRT 22yo+      Flowsheet Row Most Recent Value   Initial Alcohol Screen: US AUDIT-C     1. How often do you have a drink containing alcohol? 3 Filed at: 2024   2. How many drinks containing alcohol do you have on a typical day you are drinking?  1 Filed at: 2024   3b. FEMALE Any Age, or MALE 65+: How often do you have 4 or more drinks on one occassion? 0 Filed at: 2024   Audit-C Score 4 Filed at: 2024   KENNEDI: How many times in the past year have you...    Used an illegal drug or used a prescription medication for non-medical reasons? Never Filed at: 2024                            History of Present Illness       Chief Complaint   Patient presents with    Abdominal Pain     C/o fluctuating RLQ abd pain since this AM, reports nausea with the more intense fluctuations in pain. Denies urinary symptoms.        Past Medical History:   Diagnosis Date    Abnormal Pap smear of cervix     in the past     Arthralgia 2020    Arthritis     Carpal tunnel syndrome     Right    Functional heart murmur in      GERD (gastroesophageal reflux disease)     no meds    Heart murmur     History of paroxysmal supraventricular tachycardia     History of PSVT (paroxysmal supraventricular tachycardia) 3/9/2018    History of urinary tract infection     recurrent in past     Hyperlipidemia     Hypertension     Miscarriage     Urinary tract infection     Uterine leiomyoma     had hysterectomy    Wears contact lenses     Wears glasses       Past Surgical  History:   Procedure Laterality Date    BUNIONECTOMY Right 2016    HERNIA REPAIR      pt was 10months old    HYSTERECTOMY      LASER ABLATION N/A     vaginal lesion(s)    MOLE EXCISION      OOPHORECTOMY      TX CYSTOURETHROSCOPY N/A 01/12/2016    Procedure: CYSTOSCOPY;  Surgeon: Darcy Álvarez DO;  Location: [facility];  Service: Gynecology    TX ENDOSCOPIC PLANTAR FASCIOTOMY Right 07/21/2017    Procedure: RELEASE FASCIA PLANTAR/FASCIOTOMY ENDOSCOPIC (EPF);  Surgeon: Liban Ivan DPM;  Location: [facility];  Service: Podiatry    TX FASCIECTOMY PLANTAR FASCIA PARTIAL SPX Left 04/08/2019    Procedure: LEFT  INSTEP PLANTAR FASCIOTOMY;  Surgeon: Morgan Mcintyre DPM;  Location: [facility];  Service: Podiatry    TX LAPS TOTAL HYSTERECT 250 GM/< W/RMVL TUBE/OVARY N/A 01/12/2016    Procedure: TOTAL LAPAROSCOPIC HYSTERECTOMY ;  Surgeon: Darcy Álvarez DO;  Location[facility];  Service: Gynecology    TX NDSC WRST SURG W/RLS TRANSVRS CARPL LIGM Right 01/29/2016    Procedure: ENDOSCOPIC CARPAL TUNNEL RELEASE;  Surgeon: Javier Montague MD;  [facility];  Service: Orthopedics    SALPINGECTOMY Bilateral 01/12/2016    Procedure: REMOVAL OF BOTH FALLOPIAN TUBES;  Surgeon: Darcy Álvarez DO;  Location: [facility];  Service:     TUBAL LIGATION        Family History   Problem Relation Age of Onset    Cancer Mother         rectal    Diabetes Mother     Uterine cancer Mother     Obesity Mother     Heart disease Mother     COPD Father     Hypothyroidism Sister     Scleroderma Sister     No Known Problems Sister     No Known Problems Daughter     No Known Problems Daughter     Heart failure Maternal Grandmother     Heart failure Maternal Grandfather     Heart attack Paternal Grandmother     No Known Problems Paternal Grandfather     Atrial fibrillation Brother     Scleroderma Brother     No Known Problems Son       Social History     Tobacco Use    Smoking status: Never    Smokeless tobacco: Never   Vaping Use    Vaping  status: Never Used   Substance Use Topics    Alcohol use: Yes     Alcohol/week: 2.0 standard drinks of alcohol     Types: 2 Glasses of wine per week     Comment: occasional alcohol use    Drug use: Never      E-Cigarette/Vaping    E-Cigarette Use Never User       E-Cigarette/Vaping Substances    Nicotine No     THC No     CBD No     Flavoring No     Other No     Unknown No       I have reviewed and agree with the history as documented.     59-year-old female who is status post total hysterectomy, hypertension, hyperlipidemia, presents with 1 day of right lower abdominal pain.  She states that started this morning and is fluctuating in severity, associated with nausea at its most severe.  Transiently radiates into the right flank.  Denies any vomiting.  Denies any trauma to the area.  Denies any fever/chills, difficulty urinating, dysuria, hematuria.  No history of kidney stones, or any other symptoms at this time.        Review of Systems   Gastrointestinal:  Positive for abdominal pain (Right lower) and nausea. Negative for constipation, diarrhea and vomiting.   Genitourinary:  Negative for decreased urine volume, difficulty urinating, dysuria, frequency, hematuria and urgency.   All other systems reviewed and are negative.          Objective       ED Triage Vitals [12/14/24 1829]   Temperature Pulse Blood Pressure Respirations SpO2 Patient Position - Orthostatic VS   98.3 °F (36.8 °C) 66 (!) 175/87 18 99 % Sitting      Temp Source Heart Rate Source BP Location FiO2 (%) Pain Score    Oral Monitor Right arm -- 6      Vitals      Date and Time Temp Pulse SpO2 Resp BP Pain Score FACES Pain Rating User   12/14/24 1829 98.3 °F (36.8 °C) 66 99 % 18 175/87 6 -- AG            Physical Exam  Vitals and nursing note reviewed.   Constitutional:       General: She is not in acute distress.     Appearance: She is not ill-appearing.   HENT:      Head: Normocephalic and atraumatic.      Right Ear: External ear normal.      Left  Ear: External ear normal.      Nose: Nose normal. No congestion or rhinorrhea.      Mouth/Throat:      Mouth: Mucous membranes are moist.      Pharynx: Oropharynx is clear.   Eyes:      General: No scleral icterus.     Extraocular Movements: Extraocular movements intact.   Cardiovascular:      Rate and Rhythm: Normal rate and regular rhythm.      Pulses: Normal pulses.      Heart sounds: Normal heart sounds.   Pulmonary:      Effort: Pulmonary effort is normal.      Breath sounds: Normal breath sounds.   Abdominal:      Palpations: Abdomen is soft.      Tenderness: There is abdominal tenderness in the right lower quadrant. There is no guarding or rebound. Negative signs include Nuñez's sign and McBurney's sign.   Musculoskeletal:         General: Normal range of motion.      Cervical back: Normal range of motion.   Skin:     General: Skin is warm and dry.   Neurological:      General: No focal deficit present.      Mental Status: She is alert and oriented to person, place, and time.   Psychiatric:         Mood and Affect: Mood normal.         Behavior: Behavior normal.         Results Reviewed       Procedure Component Value Units Date/Time    Comprehensive metabolic panel [[id number]] Collected: 12/14/24 1912    Lab Status: Final result Specimen: Blood from Arm, Right Updated: 12/14/24 1935     Sodium 135 mmol/L      Potassium 3.7 mmol/L      Chloride 103 mmol/L      CO2 25 mmol/L      ANION GAP 7 mmol/L      BUN 22 mg/dL      Creatinine 0.86 mg/dL      Glucose 95 mg/dL      Calcium 9.4 mg/dL      AST 16 U/L      ALT 17 U/L      Alkaline Phosphatase 49 U/L      Total Protein 7.4 g/dL      Albumin 4.5 g/dL      Total Bilirubin 0.30 mg/dL      eGFR 75 ml/min/1.73sq m     Narrative:      National Kidney Disease Foundation guidelines for Chronic Kidney Disease (CKD):     Stage 1 with normal or high GFR (GFR > 90 mL/min/1.73 square meters)    Stage 2 Mild CKD (GFR = 60-89 mL/min/1.73 square meters)    Stage 3A Moderate  CKD (GFR = 45-59 mL/min/1.73 square meters)    Stage 3B Moderate CKD (GFR = 30-44 mL/min/1.73 square meters)    Stage 4 Severe CKD (GFR = 15-29 mL/min/1.73 square meters)    Stage 5 End Stage CKD (GFR <15 mL/min/1.73 square meters)  Note: GFR calculation is accurate only with a steady state creatinine    Urine Microscopic [[id number]]  (Normal) Collected: 12/14/24 1919    Lab Status: Final result Specimen: Urine, Clean Catch Updated: 12/14/24 1932     RBC, UA 1-2 /hpf      WBC, UA None Seen /hpf      Epithelial Cells Occasional /hpf      Bacteria, UA None Seen /hpf     UA w Reflex to Microscopic w Reflex to Culture [[id number]]  (Abnormal) Collected: 12/14/24 1919    Lab Status: Final result Specimen: Urine, Clean Catch Updated: 12/14/24 1931     Color, UA Light Yellow     Clarity, UA Clear     Specific Gravity, UA 1.022     pH, UA 5.5     Leukocytes, UA Negative     Nitrite, UA Negative     Protein, UA Negative mg/dl      Glucose, UA Negative mg/dl      Ketones, UA Negative mg/dl      Urobilinogen, UA <2.0 mg/dl      Bilirubin, UA Negative     Occult Blood, UA Small    CBC and differential [[id number]]  (Abnormal) Collected: 12/14/24 1912    Lab Status: Final result Specimen: Blood from Arm, Right Updated: 12/14/24 1926     WBC 10.80 Thousand/uL      RBC 4.20 Million/uL      Hemoglobin 13.3 g/dL      Hematocrit 38.9 %      MCV 93 fL      MCH 31.7 pg      MCHC 34.2 g/dL      RDW 12.5 %      MPV 10.2 fL      Platelets 310 Thousands/uL      nRBC 0 /100 WBCs      Segmented % 75 %      Immature Grans % 0 %      Lymphocytes % 17 %      Monocytes % 6 %      Eosinophils Relative 1 %      Basophils Relative 1 %      Absolute Neutrophils 8.11 Thousands/µL      Absolute Immature Grans 0.04 Thousand/uL      Absolute Lymphocytes 1.86 Thousands/µL      Absolute Monocytes 0.69 Thousand/µL      Eosinophils Absolute 0.05 Thousand/µL      Basophils Absolute 0.05 Thousands/µL             CT abdomen pelvis with contrast   Final  Interpretation by Keon Hammer MD (12/14 2108)      No acute abnormality identified in the lower chest, abdomen or pelvis.         Workstation performed: [id number]             Procedures    ED Medication and Procedure Management   Prior to Admission Medications   Prescriptions Last Dose Informant Patient Reported? Taking?   Cranberry-Vit C-Lactobacillus (CRANBERRY/PROBIOTIC/VIT C) 450-30 MG TABS  Self Yes No   Sig: Take 1 tablet by mouth daily   Progesterone 100 MG CAPS  Self No No   Sig: TAKE ONE CAPSULE BY MOUTH DAILY AT BEDTIME   TURMERIC CURCUMIN PO  Self Yes No   Sig: Take by mouth   albuterol (ProAir HFA) 90 mcg/act inhaler   No No   Sig: Inhale 2 puffs every 6 (six) hours as needed for wheezing   atorvastatin (LIPITOR) 20 mg tablet  Self No No   Sig: Take 1 tablet (20 mg total) by mouth daily   chlorthalidone 25 mg tablet  Self No No   Sig: Take 1 tablet 4 days a week every Monday, Wednesday, Friday, and Sunday.   estradiol (ESTRACE) 0.5 MG tablet  Self No No   Sig: TAKE ONE TABLET BY MOUTH EVERY DAY   meloxicam (MOBIC) 15 mg tablet   No No   Sig: Take 1 tablet (15 mg total) by mouth daily as needed for moderate pain   metoprolol succinate (TOPROL-XL) 50 mg 24 hr tablet   No No   Sig: Take 1 tablet (50 mg total) by mouth 2 (two) times a day   olmesartan (BENICAR) 20 mg tablet  Self No No   Sig: Take 1 tablet (20 mg total) by mouth daily   omeprazole (PriLOSEC OTC) 20 MG tablet  Self No No   Sig: Take 1 tablet (20 mg total) by mouth daily      Facility-Administered Medications: None     Discharge Medication List as of 12/14/2024  9:36 PM        CONTINUE these medications which have NOT CHANGED    Details   albuterol (ProAir HFA) 90 mcg/act inhaler Inhale 2 puffs every 6 (six) hours as needed for wheezing, Starting Fri 10/11/2024, Normal      atorvastatin (LIPITOR) 20 mg tablet Take 1 tablet (20 mg total) by mouth daily, Starting Wed 3/20/2024, Normal      chlorthalidone 25 mg tablet Take 1 tablet 4  days a week every Monday, Wednesday, Friday, and Sunday., Normal      Cranberry-Vit C-Lactobacillus (CRANBERRY/PROBIOTIC/VIT C) 450-30 MG TABS Take 1 tablet by mouth daily, Historical Med      estradiol (ESTRACE) 0.5 MG tablet TAKE ONE TABLET BY MOUTH EVERY DAY, Starting Wed 7/17/2024, Normal      meloxicam (MOBIC) 15 mg tablet Take 1 tablet (15 mg total) by mouth daily as needed for moderate pain, Starting Fri 10/11/2024, Normal      metoprolol succinate (TOPROL-XL) 50 mg 24 hr tablet Take 1 tablet (50 mg total) by mouth 2 (two) times a day, Starting Tue 9/17/2024, Normal      olmesartan (BENICAR) 20 mg tablet Take 1 tablet (20 mg total) by mouth daily, Starting Wed 3/20/2024, Normal      omeprazole (PriLOSEC OTC) 20 MG tablet Take 1 tablet (20 mg total) by mouth daily, Starting Fri 3/6/2020, No Print      Progesterone 100 MG CAPS TAKE ONE CAPSULE BY MOUTH DAILY AT BEDTIME, Starting Wed 7/17/2024, Normal      TURMERIC CURCUMIN PO Take by mouth, Historical Med           No discharge procedures on file.  ED SEPSIS DOCUMENTATION   Time reflects when diagnosis was documented in both MDM as applicable and the Disposition within this note       Time User Action Codes Description Comment    12/14/2024  9:31 PM Germain Dubois Add [R10.31] Right lower quadrant abdominal pain     12/14/2024  9:31 PM Germain Dubois Add [R31.29] Microscopic hematuria                  Germain Dubois,   12/14/24 5919

## 2024-12-30 DIAGNOSIS — I10 UNCONTROLLED HYPERTENSION: ICD-10-CM

## 2024-12-30 RX ORDER — CHLORTHALIDONE 25 MG/1
TABLET ORAL
Qty: 84 TABLET | Refills: 1 | Status: SHIPPED | OUTPATIENT
Start: 2024-12-30

## 2025-01-05 DIAGNOSIS — M25.50 ARTHRALGIA, UNSPECIFIED JOINT: ICD-10-CM

## 2025-01-05 RX ORDER — MELOXICAM 15 MG/1
15 TABLET ORAL DAILY PRN
Qty: 90 TABLET | Refills: 0 | Status: SHIPPED | OUTPATIENT
Start: 2025-01-05

## 2025-01-30 ENCOUNTER — HOSPITAL ENCOUNTER (OUTPATIENT)
Dept: PULMONOLOGY | Facility: HOSPITAL | Age: 60
End: 2025-01-30
Payer: COMMERCIAL

## 2025-01-30 DIAGNOSIS — J98.01 BRONCHOSPASM: ICD-10-CM

## 2025-01-30 PROCEDURE — 94760 N-INVAS EAR/PLS OXIMETRY 1: CPT

## 2025-01-30 PROCEDURE — 94726 PLETHYSMOGRAPHY LUNG VOLUMES: CPT

## 2025-01-30 PROCEDURE — 94060 EVALUATION OF WHEEZING: CPT | Performed by: INTERNAL MEDICINE

## 2025-01-30 PROCEDURE — 94729 DIFFUSING CAPACITY: CPT | Performed by: INTERNAL MEDICINE

## 2025-01-30 PROCEDURE — 94729 DIFFUSING CAPACITY: CPT

## 2025-01-30 PROCEDURE — 94060 EVALUATION OF WHEEZING: CPT

## 2025-01-30 PROCEDURE — 94726 PLETHYSMOGRAPHY LUNG VOLUMES: CPT | Performed by: INTERNAL MEDICINE

## 2025-01-30 RX ORDER — ALBUTEROL SULFATE 0.83 MG/ML
2.5 SOLUTION RESPIRATORY (INHALATION) ONCE
Status: COMPLETED | OUTPATIENT
Start: 2025-01-30 | End: 2025-01-30

## 2025-01-30 RX ADMIN — ALBUTEROL SULFATE 2.5 MG: 2.5 SOLUTION RESPIRATORY (INHALATION) at 08:58

## 2025-02-03 ENCOUNTER — HOSPITAL ENCOUNTER (OUTPATIENT)
Dept: RADIOLOGY | Facility: MEDICAL CENTER | Age: 60
Discharge: HOME | End: 2025-02-03
Payer: COMMERCIAL

## 2025-02-03 ENCOUNTER — RESULTS FOLLOW-UP (OUTPATIENT)
Dept: FAMILY MEDICINE CLINIC | Facility: MEDICAL CENTER | Age: 60
End: 2025-02-03

## 2025-02-03 DIAGNOSIS — M85.859 OSTEOPENIA OF NECK OF FEMUR, UNSPECIFIED LATERALITY: ICD-10-CM

## 2025-02-03 PROCEDURE — 77080 DXA BONE DENSITY AXIAL: CPT

## 2025-04-02 ENCOUNTER — APPOINTMENT (OUTPATIENT)
Dept: LAB | Facility: MEDICAL CENTER | Age: 60
End: 2025-04-02
Payer: COMMERCIAL

## 2025-04-02 DIAGNOSIS — Z11.59 NEED FOR HEPATITIS C SCREENING TEST: ICD-10-CM

## 2025-04-02 DIAGNOSIS — I10 ESSENTIAL HYPERTENSION: ICD-10-CM

## 2025-04-02 DIAGNOSIS — R73.03 PREDIABETES: ICD-10-CM

## 2025-04-02 DIAGNOSIS — Z11.4 SCREENING FOR HIV (HUMAN IMMUNODEFICIENCY VIRUS): ICD-10-CM

## 2025-04-02 DIAGNOSIS — E78.2 MIXED DYSLIPIDEMIA: ICD-10-CM

## 2025-04-02 LAB
ALBUMIN SERPL BCG-MCNC: 4.5 G/DL (ref 3.5–5)
ALP SERPL-CCNC: 43 U/L (ref 34–104)
ALT SERPL W P-5'-P-CCNC: 19 U/L (ref 7–52)
ANION GAP SERPL CALCULATED.3IONS-SCNC: 9 MMOL/L (ref 4–13)
AST SERPL W P-5'-P-CCNC: 20 U/L (ref 13–39)
BILIRUB SERPL-MCNC: 0.59 MG/DL (ref 0.2–1)
BUN SERPL-MCNC: 19 MG/DL (ref 5–25)
CALCIUM SERPL-MCNC: 9.7 MG/DL (ref 8.4–10.2)
CHLORIDE SERPL-SCNC: 100 MMOL/L (ref 96–108)
CHOLEST SERPL-MCNC: 175 MG/DL (ref ?–200)
CO2 SERPL-SCNC: 27 MMOL/L (ref 21–32)
CREAT SERPL-MCNC: 1.05 MG/DL (ref 0.6–1.3)
EST. AVERAGE GLUCOSE BLD GHB EST-MCNC: 120 MG/DL
GFR SERPL CREATININE-BSD FRML MDRD: 59 ML/MIN/1.73SQ M
GLUCOSE P FAST SERPL-MCNC: 87 MG/DL (ref 65–99)
HBA1C MFR BLD: 5.8 %
HCV AB SER QL: NORMAL
HDLC SERPL-MCNC: 67 MG/DL
HIV 1+2 AB+HIV1 P24 AG SERPL QL IA: NORMAL
LDLC SERPL CALC-MCNC: 67 MG/DL (ref 0–100)
POTASSIUM SERPL-SCNC: 4 MMOL/L (ref 3.5–5.3)
PROT SERPL-MCNC: 7.5 G/DL (ref 6.4–8.4)
SODIUM SERPL-SCNC: 136 MMOL/L (ref 135–147)
TRIGL SERPL-MCNC: 205 MG/DL (ref ?–150)

## 2025-04-02 PROCEDURE — 87389 HIV-1 AG W/HIV-1&-2 AB AG IA: CPT

## 2025-04-02 PROCEDURE — 80061 LIPID PANEL: CPT

## 2025-04-02 PROCEDURE — 80053 COMPREHEN METABOLIC PANEL: CPT

## 2025-04-02 PROCEDURE — 83036 HEMOGLOBIN GLYCOSYLATED A1C: CPT

## 2025-04-02 PROCEDURE — 36415 COLL VENOUS BLD VENIPUNCTURE: CPT

## 2025-04-02 PROCEDURE — 86803 HEPATITIS C AB TEST: CPT

## 2025-04-07 ENCOUNTER — OFFICE VISIT (OUTPATIENT)
Dept: CARDIOLOGY CLINIC | Facility: MEDICAL CENTER | Age: 60
End: 2025-04-07
Payer: COMMERCIAL

## 2025-04-07 VITALS
HEART RATE: 89 BPM | SYSTOLIC BLOOD PRESSURE: 128 MMHG | BODY MASS INDEX: 29.26 KG/M2 | OXYGEN SATURATION: 96 % | WEIGHT: 159 LBS | HEIGHT: 62 IN | DIASTOLIC BLOOD PRESSURE: 78 MMHG

## 2025-04-07 DIAGNOSIS — E78.2 MIXED DYSLIPIDEMIA: ICD-10-CM

## 2025-04-07 DIAGNOSIS — I47.10 PSVT (PAROXYSMAL SUPRAVENTRICULAR TACHYCARDIA) (HCC): Primary | ICD-10-CM

## 2025-04-07 DIAGNOSIS — I10 ESSENTIAL HYPERTENSION: ICD-10-CM

## 2025-04-07 PROCEDURE — 99214 OFFICE O/P EST MOD 30 MIN: CPT | Performed by: INTERNAL MEDICINE

## 2025-04-07 NOTE — PATIENT INSTRUCTIONS
Recommendations:  Discontinue chlorthalidone.  Continue remainder of medications.   Check 2 week Zio monitor to evaluate SVT.  Check blood pressure 2x/week. Call if consistently over 140.   Follow up in 9 months.

## 2025-04-07 NOTE — PROGRESS NOTES
Cardiology   Liliana Chavez 59 y.o. female MRN: [id number]        Impression:  PSVT - unchanged over past several years.  On b-blockers. Does have palpitations.    Hypertension - controlled.   Dyslipidemia - on statin.      Recommendations:  Discontinue chlorthalidone.  Continue remainder of medications.   Check 2 week Zio monitor to evaluate SVT.  Check blood pressure 2x/week. Call if consistently over 140.   Follow up in 9 months.         HPI: Liliana Chavez is a 59 y.o. year old female with history of PSVT, hypertension, and dyslipidemia, who presents for follow up. Has episodes of palpitations several times a month lasting seconds. Unchanged. No chest pain or shortness of breath. Occasionally with whooshing sensation. Has been having some orthostatic symptoms.          Review of Systems   Constitutional: Negative.    HENT: Negative.     Eyes: Negative.    Respiratory:  Negative for chest tightness and shortness of breath.    Cardiovascular:  Positive for palpitations. Negative for chest pain and leg swelling.   Gastrointestinal: Negative.    Endocrine: Negative.    Genitourinary: Negative.    Musculoskeletal: Negative.    Skin: Negative.    Allergic/Immunologic: Negative.    Neurological: Negative.    Hematological: Negative.    Psychiatric/Behavioral: Negative.     All other systems reviewed and are negative.        Past Medical History:   Diagnosis Date    Abnormal Pap smear of cervix     in the past     Arthralgia 2020    Arthritis     Carpal tunnel syndrome     Right    Functional heart murmur in      GERD (gastroesophageal reflux disease)     no meds    Heart murmur     History of paroxysmal supraventricular tachycardia     History of PSVT (paroxysmal supraventricular tachycardia) 3/9/2018    History of urinary tract infection     recurrent in past     Hyperlipidemia     Hypertension     Miscarriage     Urinary tract infection     Uterine leiomyoma     had hysterectomy    Wears contact lenses     Wears  glasses      Past Surgical History:   Procedure Laterality Date    BUNIONECTOMY Right 2016    HERNIA REPAIR      pt was 10months old    HYSTERECTOMY      LASER ABLATION N/A     vaginal lesion(s)    MOLE EXCISION      OOPHORECTOMY      LA CYSTOURETHROSCOPY N/A 01/12/2016    Procedure: CYSTOSCOPY;  Surgeon: Darcy Álvarez DO;  [facility];  Service: Gynecology    LA ENDOSCOPIC PLANTAR FASCIOTOMY Right 07/21/2017    Procedure: RELEASE FASCIA PLANTAR/FASCIOTOMY ENDOSCOPIC (EPF);  Surgeon: Liban Ivan DPM;  [facility];  Service: Podiatry    LA FASCIECTOMY PLANTAR FASCIA PARTIAL SPX Left 04/08/2019    Procedure: LEFT  INSTEP PLANTAR FASCIOTOMY;  Surgeon: Morgan Mcintyre DPM;  [facility];  Service: Podiatry    LA LAPS TOTAL HYSTERECT 250 GM/< W/RMVL TUBE/OVARY N/A 01/12/2016    Procedure: TOTAL LAPAROSCOPIC HYSTERECTOMY ;  Surgeon: Darcy Álvarez DO;  [facility];  Service: Gynecology    LA NDSC WRST SURG W/RLS TRANSVRS CARPL LIGM Right 01/29/2016    Procedure: ENDOSCOPIC CARPAL TUNNEL RELEASE;  Surgeon: Javier Montague MD;  [facility];  Service: Orthopedics    SALPINGECTOMY Bilateral 01/12/2016    Procedure: REMOVAL OF BOTH FALLOPIAN TUBES;  Surgeon: Darcy Álvarez DO;  [facility];  Service:     TUBAL LIGATION       Social History     Substance and Sexual Activity   Alcohol Use Yes    Alcohol/week: 2.0 standard drinks of alcohol    Types: 2 Glasses of wine per week    Comment: occasional alcohol use     Social History     Substance and Sexual Activity   Drug Use Never     Social History     Tobacco Use   Smoking Status Never   Smokeless Tobacco Never     Family History   Problem Relation Age of Onset    Cancer Mother         rectal    Diabetes Mother     Uterine cancer Mother     Obesity Mother     Heart disease Mother     COPD Father     Hypothyroidism Sister     Scleroderma Sister     No Known Problems Sister     No Known Problems Daughter     No Known  Problems Daughter     Heart failure Maternal Grandmother     Heart failure Maternal Grandfather     Heart attack Paternal Grandmother     No Known Problems Paternal Grandfather     Atrial fibrillation Brother     Scleroderma Brother     No Known Problems Son        Allergies:  Allergies   Allergen Reactions    Levaquin [Levofloxacin] Anaphylaxis and Swelling     L       Medications:     Current Outpatient Medications:     albuterol (ProAir HFA) 90 mcg/act inhaler, Inhale 2 puffs every 6 (six) hours as needed for wheezing, Disp: 8.5 g, Rfl: 0    atorvastatin (LIPITOR) 20 mg tablet, Take 1 tablet (20 mg total) by mouth daily, Disp: 90 tablet, Rfl: 3    chlorthalidone 25 mg tablet, Take 1 tablet by mouth 4 days a week every Monday, Wednesday, Friday, and Sunday., Disp: 84 tablet, Rfl: 1    Cranberry-Vit C-Lactobacillus (CRANBERRY/PROBIOTIC/VIT C) 450-30 MG TABS, Take 1 tablet by mouth daily, Disp: , Rfl:     estradiol (ESTRACE) 0.5 MG tablet, TAKE ONE TABLET BY MOUTH EVERY DAY, Disp: 90 tablet, Rfl: 3    meloxicam (MOBIC) 15 mg tablet, Take 1 tablet (15 mg total) by mouth daily as needed for moderate pain, Disp: 90 tablet, Rfl: 0    metoprolol succinate (TOPROL-XL) 50 mg 24 hr tablet, Take 1 tablet (50 mg total) by mouth 2 (two) times a day (Patient taking differently: Take 50 mg by mouth daily), Disp: 180 tablet, Rfl: 3    olmesartan (BENICAR) 20 mg tablet, Take 1 tablet (20 mg total) by mouth daily, Disp: 90 tablet, Rfl: 3    omeprazole (PriLOSEC OTC) 20 MG tablet, Take 1 tablet (20 mg total) by mouth daily, Disp: 100 tablet, Rfl: 5    Progesterone 100 MG CAPS, TAKE ONE CAPSULE BY MOUTH DAILY AT BEDTIME, Disp: 90 capsule, Rfl: 3    TURMERIC CURCUMIN PO, Take by mouth, Disp: , Rfl:       Wt Readings from Last 3 Encounters:   04/07/25 72.1 kg (159 lb)   10/18/24 72.1 kg (159 lb)   10/11/24 72.1 kg (159 lb)     Temp Readings from Last 3 Encounters:   12/14/24 98.3 °F (36.8 °C) (Oral)   10/11/24 98 °F (36.7 °C)  (Temporal)   06/11/24 98.1 °F (36.7 °C) (Temporal)     BP Readings from Last 3 Encounters:   04/07/25 128/78   12/14/24 (!) 175/87   10/11/24 110/70     Pulse Readings from Last 3 Encounters:   04/07/25 89   12/14/24 66   10/11/24 84         Physical Exam      Laboratory Studies:  CMP:  Lab Results   Component Value Date     03/19/2015    K 4.0 04/02/2025     04/02/2025    CO2 27 04/02/2025    ANIONGAP 8 03/19/2015    BUN 19 04/02/2025    CREATININE 1.05 04/02/2025    GLUCOSE 83 03/19/2015    AST 20 04/02/2025    ALT 19 04/02/2025    BILITOT 0.59 03/19/2015    EGFR 59 04/02/2025       Lipid Profile:   Lab Results   Component Value Date    CHOL 212 06/30/2015     Lab Results   Component Value Date    HDL 67 04/02/2025     Lab Results   Component Value Date    LDLCALC 67 04/02/2025     Lab Results   Component Value Date    TRIG 205 (H) 04/02/2025

## 2025-04-14 ENCOUNTER — OFFICE VISIT (OUTPATIENT)
Dept: FAMILY MEDICINE CLINIC | Facility: MEDICAL CENTER | Age: 60
End: 2025-04-14
Payer: COMMERCIAL

## 2025-04-14 VITALS
WEIGHT: 160.2 LBS | HEART RATE: 65 BPM | SYSTOLIC BLOOD PRESSURE: 138 MMHG | OXYGEN SATURATION: 98 % | BODY MASS INDEX: 29.48 KG/M2 | DIASTOLIC BLOOD PRESSURE: 76 MMHG | TEMPERATURE: 98 F | HEIGHT: 62 IN | RESPIRATION RATE: 18 BRPM

## 2025-04-14 DIAGNOSIS — M25.50 ARTHRALGIA, UNSPECIFIED JOINT: ICD-10-CM

## 2025-04-14 DIAGNOSIS — Z09 FOLLOW-UP EXAM, 3-6 MONTHS SINCE PREVIOUS EXAM: Primary | ICD-10-CM

## 2025-04-14 DIAGNOSIS — R73.03 PREDIABETES: ICD-10-CM

## 2025-04-14 DIAGNOSIS — Z86.79 HISTORY OF PSVT (PAROXYSMAL SUPRAVENTRICULAR TACHYCARDIA): ICD-10-CM

## 2025-04-14 DIAGNOSIS — E78.2 MIXED HYPERLIPIDEMIA: ICD-10-CM

## 2025-04-14 DIAGNOSIS — E78.2 MIXED DYSLIPIDEMIA: ICD-10-CM

## 2025-04-14 DIAGNOSIS — I10 ESSENTIAL HYPERTENSION: ICD-10-CM

## 2025-04-14 PROCEDURE — 99214 OFFICE O/P EST MOD 30 MIN: CPT | Performed by: STUDENT IN AN ORGANIZED HEALTH CARE EDUCATION/TRAINING PROGRAM

## 2025-04-14 RX ORDER — MELOXICAM 15 MG/1
15 TABLET ORAL DAILY PRN
Qty: 90 TABLET | Refills: 0 | Status: SHIPPED | OUTPATIENT
Start: 2025-04-14

## 2025-04-14 RX ORDER — MELOXICAM 15 MG/1
15 TABLET ORAL DAILY PRN
Qty: 90 TABLET | Refills: 0 | OUTPATIENT
Start: 2025-04-14

## 2025-04-14 NOTE — PROGRESS NOTES
Name: Liliana Chavez      : 1965      MRN: [id number]  Encounter Provider: Rich Baldwin DO  Encounter Date: 2025   Encounter department: [facility]  :  Assessment & Plan  Follow-up exam, 3-6 months since previous exam  Return in 6 months for annual physical and sooner as needed  Preventative screenings for hepatitis C and HIV negative       Mixed dyslipidemia    I have reviewed pertinent labs:  CMP:   Lab Results   Component Value Date    SODIUM 136 2025    K 4.0 2025     2025    CO2 27 2025    AGAP 9 2025    BUN 19 2025    CREATININE 1.05 2025    GLUC 95 2024    GLUF 87 2025    CALCIUM 9.7 2025    AST 20 2025    ALT 19 2025    ALKPHOS 43 2025    TP 7.5 2025    ALB 4.5 2025    TBILI 0.59 2025    EGFR 59 2025     Lipid Profile:   Lab Results   Component Value Date    CHOLESTEROL 175 2025    HDL 67 2025    TRIG 205 (H) 2025    LDLCALC 67 2025    NONHDLC 127 2024     Continue Lipitor 20 mg p.o. nightly  Add fish oil supplement 2000 mg p.o. daily  Repeat lipid panel in 6 months to reassess triglyceride levels  Orders:    Lipid Panel with Direct LDL reflex; Future    Prediabetes    I have reviewed pertinent labs:  Hemoglobin A1C/EST AVG Glucose   Lab Results   Component Value Date    HGBA1C 5.8 (H) 2025     2025       Orders:    Hemoglobin A1C; Future    Essential hypertension  Well-controlled, continue olmesartan 20 mg p.o. daily  Thiazide discontinued by cardiology  Orders:    Basic metabolic panel; Future    Arthralgia, unspecified joint    Orders:    meloxicam (MOBIC) 15 mg tablet; Take 1 tablet (15 mg total) by mouth daily as needed for moderate pain    History of PSVT (paroxysmal supraventricular tachycardia)  Recently saw her cardiologistSimona monitor              History of Present Illness   HPI    Liliana Chavez is a  "59-year-old female who presents for follow-up.  Patient does occasionally feel palpitations, she saw her cardiologist recently and ZIO monitor ordered.      Review of Systems    As noted in HPI     Objective   /76 (BP Location: Left arm, Patient Position: Sitting, Cuff Size: Large)   Pulse 65   Temp 98 °F (36.7 °C) (Temporal)   Resp 18   Ht 5' 2\" (1.575 m)   Wt 72.7 kg (160 lb 3.2 oz)   SpO2 98%   BMI 29.30 kg/m²      Physical Exam  Vitals reviewed.   Constitutional:       General: She is not in acute distress.     Appearance: Normal appearance.   HENT:      Head: Normocephalic and atraumatic.   Eyes:      Conjunctiva/sclera: Conjunctivae normal.   Cardiovascular:      Rate and Rhythm: Normal rate and regular rhythm. Extrasystoles are present.     Pulses: Normal pulses.      Heart sounds: Normal heart sounds.   Pulmonary:      Effort: Pulmonary effort is normal.      Breath sounds: Normal breath sounds.   Abdominal:      General: Abdomen is flat. Bowel sounds are normal.      Palpations: Abdomen is soft.      Tenderness: There is no abdominal tenderness.   Musculoskeletal:      Cervical back: Neck supple.      Right lower leg: No edema.      Left lower leg: No edema.   Skin:     General: Skin is warm and dry.   Neurological:      Mental Status: She is alert and oriented to person, place, and time.   Psychiatric:         Mood and Affect: Mood normal.         Behavior: Behavior normal.         Thought Content: Thought content normal.         "

## 2025-04-14 NOTE — ASSESSMENT & PLAN NOTE
I have reviewed pertinent labs:  CMP:   Lab Results   Component Value Date    SODIUM 136 04/02/2025    K 4.0 04/02/2025     04/02/2025    CO2 27 04/02/2025    AGAP 9 04/02/2025    BUN 19 04/02/2025    CREATININE 1.05 04/02/2025    GLUC 95 12/14/2024    GLUF 87 04/02/2025    CALCIUM 9.7 04/02/2025    AST 20 04/02/2025    ALT 19 04/02/2025    ALKPHOS 43 04/02/2025    TP 7.5 04/02/2025    ALB 4.5 04/02/2025    TBILI 0.59 04/02/2025    EGFR 59 04/02/2025     Lipid Profile:   Lab Results   Component Value Date    CHOLESTEROL 175 04/02/2025    HDL 67 04/02/2025    TRIG 205 (H) 04/02/2025    LDLCALC 67 04/02/2025    NONHDLC 127 03/30/2024     Continue Lipitor 20 mg p.o. nightly  Add fish oil supplement 2000 mg p.o. daily  Repeat lipid panel in 6 months to reassess triglyceride levels  Orders:    Lipid Panel with Direct LDL reflex; Future

## 2025-04-14 NOTE — ASSESSMENT & PLAN NOTE
Well-controlled, continue olmesartan 20 mg p.o. daily  Thiazide discontinued by cardiology  Orders:    Basic metabolic panel; Future

## 2025-04-15 RX ORDER — ATORVASTATIN CALCIUM 20 MG/1
20 TABLET, FILM COATED ORAL DAILY
Qty: 90 TABLET | Refills: 1 | Status: SHIPPED | OUTPATIENT
Start: 2025-04-15

## 2025-04-27 DIAGNOSIS — I10 UNCONTROLLED HYPERTENSION: ICD-10-CM

## 2025-04-29 RX ORDER — OLMESARTAN MEDOXOMIL 20 MG/1
20 TABLET ORAL DAILY
Qty: 90 TABLET | Refills: 1 | Status: SHIPPED | OUTPATIENT
Start: 2025-04-29

## 2025-06-23 ENCOUNTER — OFFICE VISIT (OUTPATIENT)
Dept: URGENT CARE | Facility: MEDICAL CENTER | Age: 60
End: 2025-06-23
Payer: COMMERCIAL

## 2025-06-23 VITALS
WEIGHT: 160 LBS | BODY MASS INDEX: 29.44 KG/M2 | HEIGHT: 62 IN | HEART RATE: 82 BPM | TEMPERATURE: 98.9 F | RESPIRATION RATE: 20 BRPM | OXYGEN SATURATION: 97 % | DIASTOLIC BLOOD PRESSURE: 82 MMHG | SYSTOLIC BLOOD PRESSURE: 142 MMHG

## 2025-06-23 DIAGNOSIS — R11.0 NAUSEA: ICD-10-CM

## 2025-06-23 DIAGNOSIS — B34.9 VIRAL SYNDROME: ICD-10-CM

## 2025-06-23 DIAGNOSIS — K52.9 NONINFECTIOUS GASTROENTERITIS, UNSPECIFIED TYPE: Primary | ICD-10-CM

## 2025-06-23 PROCEDURE — 99213 OFFICE O/P EST LOW 20 MIN: CPT | Performed by: PHYSICIAN ASSISTANT

## 2025-06-23 RX ORDER — LOPERAMIDE HYDROCHLORIDE 2 MG/1
2 TABLET ORAL 4 TIMES DAILY PRN
Qty: 30 TABLET | Refills: 0 | Status: SHIPPED | OUTPATIENT
Start: 2025-06-23

## 2025-06-23 RX ORDER — ONDANSETRON 4 MG/1
4 TABLET, FILM COATED ORAL EVERY 8 HOURS PRN
Qty: 20 TABLET | Refills: 0 | Status: SHIPPED | OUTPATIENT
Start: 2025-06-23

## 2025-06-23 NOTE — PROGRESS NOTES
[facility]        NAME: Liliana Chavez is a 60 y.o. female  : 1965    MRN: [id number]  DATE: 2025  TIME: 2:03 PM    Assessment and Plan   Noninfectious gastroenteritis, unspecified type [K52.9]  1. Noninfectious gastroenteritis, unspecified type  loperamide (IMODIUM A-D) 2 MG tablet      2. Viral syndrome        3. Nausea  ondansetron (ZOFRAN) 4 mg tablet            Patient Instructions     Gastroenteritis  Zofran as needed for nausea  Imodium 5 as needed for diarrhea  Brat diet  Increase fluid intake  Follow up with PCP in 3-5 days.  Proceed to  ER if symptoms worsen.    Chief Complaint     Chief Complaint   Patient presents with    Earache     Right ear pain     Fever     Fever yesterday; tmax 100.9     Cold Like Symptoms     Started Wednesday with viral symptoms and headache          History of Present Illness       60-year-old female who presents complaining of cough, congestion, watery brown diarrhea (no blood, no mucus), nausea but no vomiting, fevers Tmax 100.9 x 4 days.  Patient states that she has been taking over-the-counter medications with minimal relief.  Denies chest pain, shortness of breath.    Earache   There is pain in the right ear. This is a new problem. The current episode started yesterday. The problem occurs every few minutes. The problem has been gradually worsening. The maximum temperature recorded prior to her arrival was 100.4 - 100.9 F. The fever has been present for 1 to 2 days. The pain is at a severity of 5/10. Associated symptoms include coughing, diarrhea, headaches, neck pain, rhinorrhea and a sore throat. Pertinent negatives include no abdominal pain, ear discharge, hearing loss, rash or vomiting.   Fever  Associated symptoms include coughing, headaches, neck pain and a sore throat. Pertinent negatives include no abdominal pain, rash or vomiting.       Review of Systems   Review of Systems   HENT:  Positive for ear pain, rhinorrhea and sore throat. Negative  "for ear discharge and hearing loss.    Respiratory:  Positive for cough.    Gastrointestinal:  Positive for diarrhea. Negative for abdominal pain and vomiting.   Musculoskeletal:  Positive for neck pain.   Skin:  Negative for rash.   Neurological:  Positive for headaches.         Current Medications     Current Medications[1]    Current Allergies     Allergies as of 06/23/2025 - Reviewed 06/23/2025   Allergen Reaction Noted    Levaquin [levofloxacin] Anaphylaxis and Swelling 12/28/2015            The following portions of the patient's history were reviewed and updated as appropriate: allergies, current medications, past family history, past medical history, past social history, past surgical history and problem list.     Past Medical History[2]    Past Surgical History[3]    Family History[4]      Medications have been verified.        Objective   /82 Comment: manual bp  Pulse 82   Temp 98.9 °F (37.2 °C) (Temporal)   Resp 20   Ht 5' 2\" (1.575 m)   Wt 72.6 kg (160 lb)   SpO2 97%   BMI 29.26 kg/m²        Physical Exam     Physical Exam  Constitutional:       General: She is not in acute distress.     Appearance: Normal appearance. She is well-developed. She is not diaphoretic.   HENT:      Head: Normocephalic and atraumatic.      Right Ear: Hearing, tympanic membrane, ear canal and external ear normal.      Left Ear: Hearing, tympanic membrane, ear canal and external ear normal.      Nose: Congestion and rhinorrhea present.      Mouth/Throat:      Mouth: Mucous membranes are moist.      Pharynx: Oropharynx is clear. Uvula midline. No oropharyngeal exudate or posterior oropharyngeal erythema.     Cardiovascular:      Rate and Rhythm: Normal rate and regular rhythm.      Heart sounds: Normal heart sounds.   Pulmonary:      Effort: Pulmonary effort is normal. No respiratory distress.      Breath sounds: Normal breath sounds. No stridor. No wheezing, rhonchi or rales.   Chest:      Chest wall: No tenderness. "   Abdominal:      General: Abdomen is flat. There is no distension.      Palpations: Abdomen is soft. There is no mass.      Tenderness: There is no abdominal tenderness. There is no right CVA tenderness, left CVA tenderness, guarding or rebound.     Musculoskeletal:      Cervical back: Normal range of motion and neck supple.   Lymphadenopathy:      Cervical: Cervical adenopathy present.     Neurological:      Mental Status: She is alert.                        [1]   Current Outpatient Medications:     albuterol (ProAir HFA) 90 mcg/act inhaler, Inhale 2 puffs every 6 (six) hours as needed for wheezing, Disp: 8.5 g, Rfl: 0    atorvastatin (LIPITOR) 20 mg tablet, Take 1 tablet (20 mg total) by mouth daily, Disp: 90 tablet, Rfl: 1    Cranberry-Vit C-Lactobacillus (CRANBERRY/PROBIOTIC/VIT C) 450-30 MG TABS, Take 1 tablet by mouth in the morning., Disp: , Rfl:     estradiol (ESTRACE) 0.5 MG tablet, TAKE ONE TABLET BY MOUTH EVERY DAY, Disp: 90 tablet, Rfl: 3    loperamide (IMODIUM A-D) 2 MG tablet, Take 1 tablet (2 mg total) by mouth 4 (four) times a day as needed for diarrhea, Disp: 30 tablet, Rfl: 0    meloxicam (MOBIC) 15 mg tablet, Take 1 tablet (15 mg total) by mouth daily as needed for moderate pain, Disp: 90 tablet, Rfl: 0    metoprolol succinate (TOPROL-XL) 50 mg 24 hr tablet, Take 1 tablet (50 mg total) by mouth 2 (two) times a day (Patient taking differently: Take 25 mg by mouth in the morning. Daily .), Disp: 180 tablet, Rfl: 3    olmesartan (BENICAR) 20 mg tablet, Take 1 tablet (20 mg total) by mouth daily, Disp: 90 tablet, Rfl: 1    omeprazole (PriLOSEC OTC) 20 MG tablet, Take 1 tablet (20 mg total) by mouth daily, Disp: 100 tablet, Rfl: 5    ondansetron (ZOFRAN) 4 mg tablet, Take 1 tablet (4 mg total) by mouth every 8 (eight) hours as needed for nausea or vomiting, Disp: 20 tablet, Rfl: 0    Progesterone 100 MG CAPS, TAKE ONE CAPSULE BY MOUTH DAILY AT BEDTIME, Disp: 90 capsule, Rfl: 3  [2]   Past Medical  History:  Diagnosis Date    Abnormal Pap smear of cervix     in the past     Arthralgia 2020    Arthritis     Carpal tunnel syndrome     Right    Functional heart murmur in      GERD (gastroesophageal reflux disease)     no meds    Heart murmur     History of paroxysmal supraventricular tachycardia     History of PSVT (paroxysmal supraventricular tachycardia) 3/9/2018    History of urinary tract infection     recurrent in past     Hyperlipidemia     Hypertension     Miscarriage     Urinary tract infection     Uterine leiomyoma     had hysterectomy    Wears contact lenses     Wears glasses    [3]   Past Surgical History:  Procedure Laterality Date    BUNIONECTOMY Right     HERNIA REPAIR      pt was 10months old    HYSTERECTOMY      LASER ABLATION N/A     vaginal lesion(s)    MOLE EXCISION      OOPHORECTOMY      AK CYSTOURETHROSCOPY N/A 2016    Procedure: CYSTOSCOPY;  Surgeon: Darcy Álvarez DO;  [facility];  Service: Gynecology    AK ENDOSCOPIC PLANTAR FASCIOTOMY Right 2017    Procedure: RELEASE FASCIA PLANTAR/FASCIOTOMY ENDOSCOPIC (EPF);  Surgeon: Liban Ivan DPM;  [facility];  Service: Podiatry    AK FASCIECTOMY PLANTAR FASCIA PARTIAL SPX Left 2019    Procedure: LEFT  INSTEP PLANTAR FASCIOTOMY;  Surgeon: Morgan Mcintyre DPM;  [facility];  Service: Podiatry    AK LAPS TOTAL HYSTERECT 250 GM/< W/RMVL TUBE/OVARY N/A 2016    Procedure: TOTAL LAPAROSCOPIC HYSTERECTOMY ;  Surgeon: Darcy Álvarez DO;  [facility];  Service: Gynecology    AK NDSC WRST SURG W/RLS TRANSVRS CARPL LIGM Right 2016    Procedure: ENDOSCOPIC CARPAL TUNNEL RELEASE;  Surgeon: Javier Montague MD;  [facility];  Service: Orthopedics    SALPINGECTOMY Bilateral 2016    Procedure: REMOVAL OF BOTH FALLOPIAN TUBES;  Surgeon: Darcy Álvarez DO;  [facility];  Service:     TUBAL LIGATION      WRIST SURGERY  2014    Carpal Tunnel right side   [4]    Family History  Problem Relation Name Age of Onset    Cancer Mother Mom         rectal    Diabetes Mother Mom     Uterine cancer Mother Mom     Obesity Mother Mom     Heart disease Mother Mom     COPD Father 72     Coronary artery disease Father 72     Hypothyroidism Sister Marlene     Scleroderma Sister Marlene     No Known Problems Sister      No Known Problems Daughter      No Known Problems Daughter      Heart failure Maternal Grandmother      Heart failure Maternal Grandfather Poppop     Heart attack Paternal Grandmother Jacque     No Known Problems Paternal Grandfather      Atrial fibrillation Brother      Scleroderma Brother      No Known Problems Son

## 2025-06-23 NOTE — PATIENT INSTRUCTIONS
Gastroenteritis  Zofran as needed for nausea  Imodium 5 as needed for diarrhea  Brat diet  Increase fluid intake  Follow up with PCP in 3-5 days.  Proceed to  ER if symptoms worsen.

## 2025-06-23 NOTE — LETTER
June 23, 2025     Patient: Liliana Chavez   YOB: 1965   Date of Visit: 6/23/2025       To Whom it May Concern:    Liliana Chavez was seen in my clinic on 6/23/2025. She may return to work on 6/26/2025.    If you have any questions or concerns, please don't hesitate to call.         Sincerely,          Skip Camarillo PA-C        CC: No Recipients

## 2025-06-24 ENCOUNTER — DOCUMENTATION (OUTPATIENT)
Dept: ADMINISTRATIVE | Facility: OTHER | Age: 60
End: 2025-06-24

## 2025-06-24 NOTE — PROGRESS NOTES
Urgent Care BP  Received: Yesterday  Amish Berman RN  P Patient Reported Team         Blood pressure elevated  Appointment department: [facility]  Appointment provider: * No providers found *  Blood pressure  06/23/25 1333 142/82  06/23/25 1328 158/84    06/24/25 12:01 PM    Patient was called after the Urgent Care visit ; a message was left for the patient to return the call    Thank you.  Jack Li MA  [facility]

## 2025-07-13 DIAGNOSIS — M25.50 ARTHRALGIA, UNSPECIFIED JOINT: ICD-10-CM

## 2025-07-15 RX ORDER — MELOXICAM 15 MG/1
15 TABLET ORAL DAILY PRN
Qty: 90 TABLET | Refills: 0 | Status: SHIPPED | OUTPATIENT
Start: 2025-07-15 | End: 2025-07-21

## 2025-07-20 DIAGNOSIS — N95.1 SYMPTOMATIC MENOPAUSAL OR FEMALE CLIMACTERIC STATES: ICD-10-CM

## 2025-07-20 DIAGNOSIS — E78.2 MIXED HYPERLIPIDEMIA: ICD-10-CM

## 2025-07-20 DIAGNOSIS — M25.50 ARTHRALGIA, UNSPECIFIED JOINT: ICD-10-CM

## 2025-07-21 RX ORDER — MELOXICAM 15 MG/1
15 TABLET ORAL DAILY PRN
Qty: 90 TABLET | Refills: 0 | Status: SHIPPED | OUTPATIENT
Start: 2025-07-21

## 2025-07-22 DIAGNOSIS — I10 ESSENTIAL HYPERTENSION: Primary | ICD-10-CM

## 2025-07-22 RX ORDER — CHLORTHALIDONE 25 MG/1
25 TABLET ORAL 3 TIMES WEEKLY
Qty: 45 TABLET | Refills: 3 | Status: SHIPPED | OUTPATIENT
Start: 2025-07-23

## 2025-07-22 RX ORDER — PROGESTERONE 100 MG/1
1 CAPSULE ORAL
Qty: 90 CAPSULE | Refills: 1 | Status: SHIPPED | OUTPATIENT
Start: 2025-07-22

## 2025-07-22 RX ORDER — ATORVASTATIN CALCIUM 20 MG/1
20 TABLET, FILM COATED ORAL DAILY
Qty: 90 TABLET | Refills: 1 | Status: SHIPPED | OUTPATIENT
Start: 2025-07-22

## 2025-07-22 RX ORDER — CHLORTHALIDONE 25 MG/1
25 TABLET ORAL DAILY
COMMUNITY

## 2025-07-22 RX ORDER — ESTRADIOL 0.5 MG/1
0.5 TABLET ORAL DAILY
Qty: 90 TABLET | Refills: 1 | Status: SHIPPED | OUTPATIENT
Start: 2025-07-22

## (undated) DEVICE — INTENDED FOR TISSUE SEPARATION, AND OTHER PROCEDURES THAT REQUIRE A SHARP SURGICAL BLADE TO PUNCTURE OR CUT.: Brand: BARD-PARKER ® SAFETYLOCK CARBON RIB-BACK BLADES

## (undated) DEVICE — STOCKINETTE REGULAR

## (undated) DEVICE — CURITY NON-ADHERENT STRIPS: Brand: CURITY

## (undated) DEVICE — SUT ETHILON 3-0 PS-1 18 IN 1663H

## (undated) DEVICE — STERILE POLYISOPRENE POWDER-FREE SURGICAL GLOVES: Brand: PROTEXIS

## (undated) DEVICE — CHLORAPREP HI-LITE 26ML ORANGE

## (undated) DEVICE — UNIVERSAL  MINOR EXTREMITY PK: Brand: CARDINAL HEALTH

## (undated) DEVICE — CUFF TOURNIQUET DISP SZ18

## (undated) DEVICE — INTENDED FOR TISSUE SEPARATION, AND OTHER PROCEDURES THAT REQUIRE A SHARP SURGICAL BLADE TO PUNCTURE OR CUT.: Brand: BARD-PARKER SAFETY BLADES SIZE 15, STERILE

## (undated) DEVICE — STANDARD SURGICAL GOWN, L: Brand: CONVERTORS

## (undated) DEVICE — STRETCH BANDAGE: Brand: CURITY

## (undated) DEVICE — BLADE ENDO TRAC

## (undated) DEVICE — BETHLEHEM UNIVERSAL  MIONR EXT: Brand: CARDINAL HEALTH

## (undated) DEVICE — POV-IOD SOLUTION 4OZ BT

## (undated) DEVICE — STERILE POLYISOPRENE POWDER-FREE SURGICAL GLOVES WITH EMOLLIENT COATING: Brand: PROTEXIS

## (undated) DEVICE — URETERAL CATHETER ADAPTOR TIP

## (undated) DEVICE — SYRINGE 20ML LL

## (undated) DEVICE — UNDERGLOVE PROTEXIS  BLUE SZ 7

## (undated) DEVICE — OCCLUSIVE GAUZE STRIP,3% BISMUTH TRIBROMOPHENATE IN PETROLATUM BLEND: Brand: XEROFORM

## (undated) DEVICE — SYRINGE 10ML LL

## (undated) DEVICE — GLOVE SRG BIOGEL 8

## (undated) DEVICE — ACE WRAP 4 IN UNSTERILE

## (undated) DEVICE — SUT ETHILON 4-0 PS-2 18 IN 1667H

## (undated) DEVICE — NEEDLE 25G X 1 1/2

## (undated) DEVICE — STRL COTTON TIP APPLCTR 6IN PK: Brand: CARDINAL HEALTH

## (undated) DEVICE — NEEDLE BLUNT 18 G X 1 1/2IN

## (undated) DEVICE — CUFF TOURNIQUET 18 X 4 IN QUICK CONNECT DISP 1 BLADDER

## (undated) DEVICE — PAD CAST 4 IN COTTON NON STERILE

## (undated) DEVICE — NEEDLE 18 G X 1 1/2

## (undated) DEVICE — CURITY STRETCH BANDAGE: Brand: CURITY

## (undated) DEVICE — SYRINGE 3ML LL

## (undated) DEVICE — 2000CC GUARDIAN II: Brand: GUARDIAN

## (undated) DEVICE — COBAN 4 IN STERILE

## (undated) DEVICE — INTENDED FOR TISSUE SEPARATION, AND OTHER PROCEDURES THAT REQUIRE A SHARP SURGICAL BLADE TO PUNCTURE OR CUT.: Brand: BARD-PARKER ® CARBON RIB-BACK BLADES

## (undated) DEVICE — GAUZE SPONGES,16 PLY: Brand: CURITY